# Patient Record
Sex: FEMALE | Race: WHITE | NOT HISPANIC OR LATINO | Employment: OTHER | URBAN - METROPOLITAN AREA
[De-identification: names, ages, dates, MRNs, and addresses within clinical notes are randomized per-mention and may not be internally consistent; named-entity substitution may affect disease eponyms.]

---

## 2017-03-13 ENCOUNTER — HOSPITAL ENCOUNTER (OUTPATIENT)
Dept: RADIOLOGY | Facility: CLINIC | Age: 62
Discharge: HOME | End: 2017-03-13
Payer: COMMERCIAL

## 2017-03-13 ENCOUNTER — TRANSCRIBE ORDERS (OUTPATIENT)
Dept: RADIOLOGY | Facility: CLINIC | Age: 62
End: 2017-03-13

## 2017-03-13 DIAGNOSIS — R30.0 DYSURIA: Primary | ICD-10-CM

## 2017-03-13 PROCEDURE — 74000 HB X-RAY EXAM OF ABDOMEN (SINGLE ANTEROPOSTERIOR VIEW): CPT

## 2017-06-30 ENCOUNTER — ALLSCRIPTS OFFICE VISIT (OUTPATIENT)
Dept: OTHER | Facility: OTHER | Age: 62
End: 2017-06-30

## 2017-08-20 ENCOUNTER — HOSPITAL ENCOUNTER (EMERGENCY)
Facility: HOSPITAL | Age: 62
Discharge: HOME | End: 2017-08-20
Payer: COMMERCIAL

## 2017-08-20 ENCOUNTER — APPOINTMENT (EMERGENCY)
Dept: RADIOLOGY | Facility: HOSPITAL | Age: 62
End: 2017-08-20
Payer: COMMERCIAL

## 2017-08-20 VITALS
DIASTOLIC BLOOD PRESSURE: 90 MMHG | TEMPERATURE: 98.6 F | HEIGHT: 67 IN | OXYGEN SATURATION: 98 % | SYSTOLIC BLOOD PRESSURE: 207 MMHG | BODY MASS INDEX: 33.74 KG/M2 | WEIGHT: 215 LBS | HEART RATE: 75 BPM | RESPIRATION RATE: 14 BRPM

## 2017-08-20 DIAGNOSIS — J02.9 ACUTE PHARYNGITIS: Primary | ICD-10-CM

## 2017-08-20 LAB — S PYO AG THROAT QL: NEGATIVE

## 2017-08-20 PROCEDURE — 87070 CULTURE OTHR SPECIMN AEROBIC: CPT | Performed by: PHYSICIAN ASSISTANT

## 2017-08-20 PROCEDURE — 87430 STREP A AG IA: CPT | Performed by: PHYSICIAN ASSISTANT

## 2017-08-20 PROCEDURE — 99284 EMERGENCY DEPT VISIT MOD MDM: CPT

## 2017-08-20 PROCEDURE — 70360 X-RAY EXAM OF NECK: CPT

## 2017-08-20 RX ORDER — LOSARTAN POTASSIUM 100 MG/1
100 TABLET ORAL DAILY
COMMUNITY
End: 2018-04-04 | Stop reason: SDUPTHER

## 2017-08-20 RX ORDER — DABIGATRAN ETEXILATE 150 MG/1
150 CAPSULE, COATED PELLETS ORAL 2 TIMES DAILY
COMMUNITY

## 2017-08-20 RX ADMIN — LIDOCAINE HYDROCHLORIDE 5 ML: 20 SOLUTION ORAL; TOPICAL at 16:57

## 2017-08-21 ENCOUNTER — APPOINTMENT (EMERGENCY)
Dept: RADIOLOGY | Facility: HOSPITAL | Age: 62
End: 2017-08-21
Payer: COMMERCIAL

## 2017-08-21 ENCOUNTER — HOSPITAL ENCOUNTER (EMERGENCY)
Facility: HOSPITAL | Age: 62
Discharge: HOME | End: 2017-08-21
Payer: COMMERCIAL

## 2017-08-21 VITALS
HEIGHT: 67 IN | OXYGEN SATURATION: 96 % | DIASTOLIC BLOOD PRESSURE: 76 MMHG | SYSTOLIC BLOOD PRESSURE: 145 MMHG | WEIGHT: 220 LBS | RESPIRATION RATE: 20 BRPM | TEMPERATURE: 96.4 F | BODY MASS INDEX: 34.53 KG/M2 | HEART RATE: 78 BPM

## 2017-08-21 DIAGNOSIS — R00.2 PALPITATIONS: Primary | ICD-10-CM

## 2017-08-21 LAB
ANION GAP SERPL CALCULATED.3IONS-SCNC: 9 MMOL/L (ref 4–13)
APTT PPP: 70 SECONDS (ref 23–35)
BASOPHILS # BLD AUTO: 0.04 THOUSANDS/ΜL (ref 0–0.1)
BASOPHILS NFR BLD AUTO: 1 % (ref 0–1)
BUN SERPL-MCNC: 10 MG/DL (ref 5–25)
CALCIUM SERPL-MCNC: 9.6 MG/DL (ref 8.3–10.1)
CHLORIDE SERPL-SCNC: 101 MMOL/L (ref 100–108)
CO2 SERPL-SCNC: 30 MMOL/L (ref 21–32)
CREAT SERPL-MCNC: 1.04 MG/DL (ref 0.6–1.3)
EOSINOPHIL # BLD AUTO: 0.1 THOUSAND/ΜL (ref 0–0.61)
EOSINOPHIL NFR BLD AUTO: 1 % (ref 0–6)
ERYTHROCYTE [DISTWIDTH] IN BLOOD BY AUTOMATED COUNT: 12.3 % (ref 11.6–15.1)
GFR SERPL CREATININE-BSD FRML MDRD: 57 ML/MIN/1.73SQ M
GLUCOSE SERPL-MCNC: 178 MG/DL (ref 65–140)
HCT VFR BLD AUTO: 47.4 % (ref 34.8–46.1)
HGB BLD-MCNC: 15.7 G/DL (ref 11.5–15.4)
INR PPP: 1.32 (ref 0.86–1.16)
LYMPHOCYTES # BLD AUTO: 1.62 THOUSANDS/ΜL (ref 0.6–4.47)
LYMPHOCYTES NFR BLD AUTO: 19 % (ref 14–44)
MCH RBC QN AUTO: 30 PG (ref 26.8–34.3)
MCHC RBC AUTO-ENTMCNC: 33.1 G/DL (ref 31.4–37.4)
MCV RBC AUTO: 91 FL (ref 82–98)
MONOCYTES # BLD AUTO: 0.73 THOUSAND/ΜL (ref 0.17–1.22)
MONOCYTES NFR BLD AUTO: 9 % (ref 4–12)
NEUTROPHILS # BLD AUTO: 6.11 THOUSANDS/ΜL (ref 1.85–7.62)
NEUTS SEG NFR BLD AUTO: 70 % (ref 43–75)
PLATELET # BLD AUTO: 276 THOUSANDS/UL (ref 149–390)
PMV BLD AUTO: 11.2 FL (ref 8.9–12.7)
POTASSIUM SERPL-SCNC: 4.1 MMOL/L (ref 3.5–5.3)
PROTHROMBIN TIME: 16.2 SECONDS (ref 12.1–14.4)
RBC # BLD AUTO: 5.23 MILLION/UL (ref 3.81–5.12)
SODIUM SERPL-SCNC: 140 MMOL/L (ref 136–145)
TSH SERPL DL<=0.05 MIU/L-ACNC: 2.69 UIU/ML (ref 0.36–3.74)
WBC # BLD AUTO: 8.6 THOUSAND/UL (ref 4.31–10.16)

## 2017-08-21 PROCEDURE — 71020 HB CHEST X-RAY 2VW FRONTAL&LATL: CPT

## 2017-08-21 PROCEDURE — 99285 EMERGENCY DEPT VISIT HI MDM: CPT

## 2017-08-21 PROCEDURE — 85730 THROMBOPLASTIN TIME PARTIAL: CPT | Performed by: PHYSICIAN ASSISTANT

## 2017-08-21 PROCEDURE — 85610 PROTHROMBIN TIME: CPT | Performed by: PHYSICIAN ASSISTANT

## 2017-08-21 PROCEDURE — 93005 ELECTROCARDIOGRAM TRACING: CPT | Performed by: PHYSICIAN ASSISTANT

## 2017-08-21 PROCEDURE — 84443 ASSAY THYROID STIM HORMONE: CPT | Performed by: PHYSICIAN ASSISTANT

## 2017-08-21 PROCEDURE — 36415 COLL VENOUS BLD VENIPUNCTURE: CPT | Performed by: PHYSICIAN ASSISTANT

## 2017-08-21 PROCEDURE — 80048 BASIC METABOLIC PNL TOTAL CA: CPT | Performed by: PHYSICIAN ASSISTANT

## 2017-08-21 PROCEDURE — 85025 COMPLETE CBC W/AUTO DIFF WBC: CPT | Performed by: PHYSICIAN ASSISTANT

## 2017-08-22 LAB
ATRIAL RATE: 90 BPM
BACTERIA THROAT CULT: NORMAL
P AXIS: 73 DEGREES
PR INTERVAL: 158 MS
QRS AXIS: 52 DEGREES
QRSD INTERVAL: 66 MS
QT INTERVAL: 346 MS
QTC INTERVAL: 423 MS
T WAVE AXIS: 76 DEGREES
VENTRICULAR RATE: 90 BPM

## 2017-09-15 ENCOUNTER — TRANSCRIBE ORDERS (OUTPATIENT)
Dept: ADMINISTRATIVE | Facility: HOSPITAL | Age: 62
End: 2017-09-15

## 2017-09-15 DIAGNOSIS — Z12.31 VISIT FOR SCREENING MAMMOGRAM: Primary | ICD-10-CM

## 2017-10-30 ENCOUNTER — HOSPITAL ENCOUNTER (OUTPATIENT)
Dept: BONE DENSITY | Facility: IMAGING CENTER | Age: 62
Discharge: HOME | End: 2017-10-30
Payer: COMMERCIAL

## 2017-10-30 DIAGNOSIS — Z12.31 VISIT FOR SCREENING MAMMOGRAM: ICD-10-CM

## 2017-10-30 PROCEDURE — G0202 SCR MAMMO BI INCL CAD: HCPCS

## 2018-01-11 NOTE — MISCELLANEOUS
Message   Date: 30 Jun 2016 8:32 AM EST, Recorded By: Enricoregino VillarrealShemar Coronado   Phone: [phone] (Home), [phone] (Work)   Reason: Medical Complaint   Telephone call from pt with c/c of feeling nauseated, fatigued,and lo grade fever  Patient was seen in the ER at [facility] on Sunday June 26 pt was instructed to call her cardiologist and PCP for f/u appoinments  Pt states that she continues with the above symptoms not feeling any better has an appointment with PCP July 1,2016 would like Dr Daryl Dsouza to address the above symptoms, Pt also relates symptioms to medications fo Hydralzine, Eliquis and Losartan  Informed pt that I will speak to Dr Daryl Dsouza and advise  Telephone call made to Dr Daryl Dsouza and he has instructed pt to stop Hydralzine and make a follow up appointment  Pt has an appointment on 7/18 /16 with Dr Daryl Dsouza  Telephone call made to patient and informed her of the above decision and patiient verbalizes and understands to stop her Hydralzine and follow up with PCP tomorrow  Active Problems    1  AC (acromioclavicular) joint arthritis (716 91) (M19 90)   2  Adhesive capsulitis of right shoulder (726 0) (M75 01)   3  Afib (427 31) (I48 91)   4  Benign essential hypertension (401 1) (I10)   5  Impingement syndrome of right shoulder (726 2) (M75 41)   6  Plantar fasciitis of right foot (728 71) (M72 2)   7  Right foot pain (729 5) (M79 671)   8  Right shoulder pain (719 41) (M25 511)    Current Meds   1  Eliquis 5 MG Oral Tablet; Take 1 tablet twice daily; Therapy: (Recorded:06Jun2016) to Recorded   2  HydrALAZINE HCl - 25 MG Oral Tablet; Take 1 tablet twice daily; Therapy: 31DEU3578 to (Evaluate:09Hbe3429)  Requested for: 58GUN0175; Last   Rx:06Jun2016 Ordered   3  Losartan Potassium 100 MG Oral Tablet; TAKE ONE TABLET BY MOUTH EVERY DAY    Requested for: 38MDR5094; Last Rx:06Jun2016 Ordered   4  Metoprolol Tartrate 50 MG Oral Tablet;  Take 1 tablet twice daily; Therapy: (Recorded:06Jun2016) to Recorded   5  Protonix 40 MG Oral Tablet Delayed Release (Pantoprazole Sodium); TAKE 1 TABLET   DAILY; Therapy: (Recorded:15Oct2015) to Recorded   6  Xanax 0 25 MG Oral Tablet (ALPRAZolam); 1-2 tabs po qd; Therapy: (Recorded:15Oct2015) to Recorded   7  ZyrTEC 10 MG CHEW;   Therapy: (Recorded:15Oct2015) to Recorded    Allergies    1  Dilaudid TABS   2  Flagyl CAPS   3  Morphine Sulfate TABS   4  Cipro TABS   5  Clindamycin HCl CAPS   6   NexIUM CPDR   7  pantoprazole    Signatures   Electronically signed by : Coral Lopez OM; Jun 30 2016  8:56AM EST                       (Administrative)

## 2018-01-13 VITALS
DIASTOLIC BLOOD PRESSURE: 80 MMHG | SYSTOLIC BLOOD PRESSURE: 140 MMHG | WEIGHT: 230 LBS | BODY MASS INDEX: 34.86 KG/M2 | HEART RATE: 70 BPM | HEIGHT: 68 IN

## 2018-02-16 ENCOUNTER — OFFICE VISIT (OUTPATIENT)
Dept: CARDIOLOGY CLINIC | Facility: CLINIC | Age: 63
End: 2018-02-16
Payer: COMMERCIAL

## 2018-02-16 VITALS
SYSTOLIC BLOOD PRESSURE: 120 MMHG | WEIGHT: 232 LBS | DIASTOLIC BLOOD PRESSURE: 60 MMHG | BODY MASS INDEX: 36.34 KG/M2 | HEART RATE: 70 BPM

## 2018-02-16 DIAGNOSIS — I48.91 NEW ONSET A-FIB (HCC): Primary | Chronic | ICD-10-CM

## 2018-02-16 DIAGNOSIS — I10 ESSENTIAL HYPERTENSION: Chronic | ICD-10-CM

## 2018-02-16 PROCEDURE — 99214 OFFICE O/P EST MOD 30 MIN: CPT | Performed by: INTERNAL MEDICINE

## 2018-02-16 PROCEDURE — 93000 ELECTROCARDIOGRAM COMPLETE: CPT | Performed by: INTERNAL MEDICINE

## 2018-02-16 RX ORDER — PREDNISONE 20 MG/1
TABLET ORAL
COMMUNITY
Start: 2017-12-05 | End: 2018-02-16 | Stop reason: ALTCHOICE

## 2018-02-16 RX ORDER — METOPROLOL TARTRATE 50 MG/1
TABLET, FILM COATED ORAL
COMMUNITY
Start: 2018-02-10 | End: 2018-02-16 | Stop reason: SDUPTHER

## 2018-02-16 RX ORDER — VALACYCLOVIR HYDROCHLORIDE 1 G/1
TABLET, FILM COATED ORAL
COMMUNITY
Start: 2017-11-28 | End: 2018-02-16 | Stop reason: ALTCHOICE

## 2018-02-16 NOTE — PROGRESS NOTES
Cardiology   Grace Hutton 57 y o  female MRN: [id number]        Impression:  1  Paroxysmal atrial fibrillation - in normal sinus rhythm  On anticoagulation  Does have occasional episodes  2  Hypertension - good control      Recommendations:  1  Continue current medications  2  Can take an extra metoprolol if you are in atrial fibrillation  3  Follow up in 6 months  HPI: Grace Hutton is a 57y o  year old female with new diagnosis of atrial fibrillation in setting of hypokalemia returns for follow up  In 2016, echocardiogram demonstrated structurally normal heart  Had palpitations in 2017, went to ED and sent home  Then 10 days later, had worse episode, but remitted after 12 hours  Occasionally has palpitations  Review of Systems   Constitutional: Negative  HENT: Negative  Eyes: Negative  Respiratory: Negative for chest tightness and shortness of breath  Cardiovascular: Positive for palpitations  Negative for chest pain and leg swelling  Gastrointestinal: Negative  Endocrine: Negative  Genitourinary: Negative  Musculoskeletal: Negative  Skin: Negative  Allergic/Immunologic: Negative  Neurological: Negative  Hematological: Negative  Psychiatric/Behavioral: Negative  All other systems reviewed and are negative          Past Medical History:   Diagnosis Date    Arthritis 2016    Diabetes mellitus ([facility] [address] )     GERD (gastroesophageal reflux disease)     Hypertension 2016    Hypertensive urgency 2016    Hypokalemia 2016    Hypomagnesemia 2016    New onset a-fib ([address] ) 2016    Psychiatric disorder     anxiety    SOB (shortness of breath) 2016     Past Surgical History:   Procedure Laterality Date     SECTION      CHOLECYSTECTOMY      KIDNEY STONE SURGERY       History   Alcohol Use No     History   Drug Use No     History   Smoking Status    Former Smoker   Smokeless Tobacco    Not on file     Comment: quit over 40 years ago     History reviewed  No pertinent family history  Allergies: Allergies   Allergen Reactions    Flagyl [Metronidazole] Anaphylaxis and Rash    Hydromorphone Anaphylaxis    Morphine Anaphylaxis, Rash and GI Intolerance    Ciprofloxacin GI Intolerance    Clindamycin Other (See Comments)    Dilaudid [Hydromorphone Hcl]     Nexium  [Esomeprazole Magnesium] Diarrhea, GI Intolerance and Other (See Comments)    Pantoprazole Diarrhea and GI Intolerance    Amlodipine Rash       Medications:     Current Outpatient Prescriptions:     ALPRAZolam (XANAX) 0 25 mg tablet, 1 tablet 2 (two) times a day as needed  , Disp: , Rfl:     dabigatran etexilate (PRADAXA) 150 mg capsu, Take 150 mg by mouth 2 (two) times a day, Disp: , Rfl:     losartan (COZAAR) 100 MG tablet, Take 100 mg by mouth daily, Disp: , Rfl:     metoprolol tartrate (LOPRESSOR) 25 mg tablet, 100 mg 2 (two) times a day  , Disp: , Rfl:     pantoprazole (PROTONIX) 40 mg tablet, 1 tablet daily  , Disp: , Rfl:       Wt Readings from Last 3 Encounters:   02/16/18 105 kg (232 lb)   08/21/17 99 8 kg (220 lb)   08/20/17 97 5 kg (215 lb)     Temp Readings from Last 3 Encounters:   08/21/17 (!) 96 4 °F (35 8 °C)   08/20/17 98 6 °F (37 °C) (Tympanic)   12/17/16 98 6 °F (37 °C)     BP Readings from Last 3 Encounters:   02/16/18 120/60   08/21/17 145/76   08/20/17 (!) 207/90     Pulse Readings from Last 3 Encounters:   02/16/18 70   08/21/17 78   08/20/17 75         Physical Exam   Constitutional: She is oriented to person, place, and time  She appears well-developed  HENT:   Head: Normocephalic  Eyes: EOM are normal    Neck: Normal range of motion  Cardiovascular: Normal rate, regular rhythm and normal heart sounds  Exam reveals no gallop and no friction rub  No murmur heard  Pulmonary/Chest: Effort normal and breath sounds normal  No respiratory distress  She has no wheezes  She has no rales  Abdominal: Soft  Musculoskeletal: Normal range of motion  Neurological: She is alert and oriented to person, place, and time  Skin: Skin is warm and dry  Psychiatric: She has a normal mood and affect  Laboratory Studies:  CMP:  Lab Results   Component Value Date     2017    K 4 1 2017     2017    CO2 30 2017    ANIONGAP 9 2017    BUN 10 2017    CREATININE 1 04 2017    GLUCOSE 178 (H) 2017     (H) 2016     (H) 2016    BILITOT 1 60 (H) 2016    EGFR 57 2017       Lipid Profile:   Lab Results   Component Value Date    CHOL 152 2016     Lab Results   Component Value Date    HDL 51 2016     Lab Results   Component Value Date    LDLCALC 80 2016     Lab Results   Component Value Date    TRIG 107 2016       Cardiac testing:   EKG reviewed personally: NSR 70 Nml  Results for orders placed during the hospital encounter of 16   Echo complete with contrast if indicated    Narrative [address] [place] [address], [address] [place] [address]  [phone]    Transthoracic Echocardiogram  2D, M-mode, Doppler, and Color Doppler    Study date:  23-May-2016    Patient: Mariama Vaughn  MR number: [id number]  Account number: [de-identified]  : 1955  Age: 56 years  Gender: Female  Status: Inpatient  Location: Echo lab  Height: 68 in  Weight: 228 6 lb  BP: 111/ 58 mmHg    Indications: Atrial fibrillation  Diagnoses: I48 0 - Atrial fibrillation    Sonographer:  Crist Hammans [facility] AE-PE  Primary Physician:  Jovanni Connell DO  Referring Physician:  ECHO Baxter  Group:  [facility]  Interpreting Physician:  Leona Bernabe MD    SUMMARY    LEFT VENTRICLE:  Size was normal   Systolic function was normal  Ejection fraction was estimated to be 60 %    Wall thickness was normal   Left ventricular diastolic function parameters were normal     RIGHT VENTRICLE:  The size was normal   Systolic function was normal     MITRAL VALVE:  There was trace regurgitation  TRICUSPID VALVE:  There was trace regurgitation  HISTORY: PRIOR HISTORY: Atrial fibrillation  Risk factors: hypertension  PROCEDURE: The procedure was performed in the echo lab  This was a routine  study  The transthoracic approach was used  The study included complete 2D  imaging, M-mode, complete spectral Doppler, and color Doppler  The heart rate  was 65 bpm, at the start of the study  Image quality was adequate  LEFT VENTRICLE: Size was normal  Systolic function was normal  Ejection  fraction was estimated to be 60 %  There were no regional wall motion  abnormalities  Wall thickness was normal  DOPPLER: Left ventricular diastolic  function parameters were normal     RIGHT VENTRICLE: The size was normal  Systolic function was normal  Wall  thickness was normal     LEFT ATRIUM: Size was normal     RIGHT ATRIUM: Size was normal     MITRAL VALVE: Valve structure was normal  There was normal leaflet separation  DOPPLER: The transmitral velocity was within the normal range  There was no  evidence for stenosis  There was trace regurgitation  AORTIC VALVE: The valve was trileaflet  Leaflets exhibited normal thickness and  normal cuspal separation  DOPPLER: Transaortic velocity was within the normal  range  There was no evidence for stenosis  There was no regurgitation  TRICUSPID VALVE: The valve structure was normal  There was normal leaflet  separation  DOPPLER: The transtricuspid velocity was within the normal range  There was no evidence for stenosis  There was trace regurgitation  The  tricuspid jet envelope definition was inadequate for estimation of RV systolic  pressure  There are no indirect findings suggestive of moderate or severe  pulmonary hypertension  PULMONIC VALVE: Leaflets exhibited normal thickness, no calcification, and  normal cuspal separation  DOPPLER: The transpulmonic velocity was within the  normal range  There was no regurgitation  PERICARDIUM: There was no pericardial effusion  The pericardium was normal in  appearance  AORTA: The root exhibited normal size  SYSTEMIC VEINS: IVC: The inferior vena cava was not well visualized      SYSTEM MEASUREMENT TABLES    2D  %FS: 35 01 %  Ao Diam: 2 51 cm  EDV(Teich): 58 83 ml  EF(Teich): 65 13 %  ESV(Cube): 14 12 ml  ESV(Teich): 20 51 ml  IVSd: 0 83 cm  LA Area: 11 64 cm2  LA Diam: 3 74 cm  LVEDV MOD A4C: 52 3 ml  LVEF MOD A4C: 58 61 %  LVESV MOD A4C: 21 65 ml  LVIDd: 3 72 cm  LVIDs: 2 42 cm  LVLd A4C: 5 39 cm  LVLs A4C: 4 33 cm  LVPWd: 0 79 cm  RA Area: 12 03 cm2  RV Diam : 2 61 cm  SI(Cube): 17 19 ml/m2  SI(Teich): 17 66 ml/m2  SV MOD A4C: 30 65 ml  SV(Cube): 37 31 ml  SV(Teich): 38 32 ml    CW  TR Vmax: 2 21 m/s  TR maxP 54 mmHg    MM  TAPSE: 2 11 cm    PW  E': 0 09 m/s  E/E': 10 14  MV A Sreekanth: 0 51 m/s  MV Dec Jewell: 4 58 m/s2  MV DecT: 190 35 ms  MV E Sreekanth: 0 87 m/s  MV E/A Ratio: 1 71    [facility] Accredited Echocardiography Laboratory    Prepared and electronically signed by    Maday Lindsay MD  Signed [id number] 18:12:31

## 2018-02-16 NOTE — PATIENT INSTRUCTIONS
Recommendations:  1  Continue current medications  2  Can take an extra metoprolol if you are in atrial fibrillation  3  Follow up in 6 months

## 2018-03-09 ENCOUNTER — TELEPHONE (OUTPATIENT)
Dept: CARDIOLOGY CLINIC | Facility: CLINIC | Age: 63
End: 2018-03-09

## 2018-03-09 DIAGNOSIS — R00.2 PALPITATIONS: Primary | ICD-10-CM

## 2018-03-09 NOTE — TELEPHONE ENCOUNTER
Patient called answering service 2:48 am this morning (friday) 8/9/18  Patient was in AFIB and her BP was 166/134  The on call  was to be paged    I just spoke with patient to follow up and she states she was awoken around or just before 2:00 am this morning  She was feeling as though she was unable to breathe and swallow  Patient stated she was "sweating and had numbness and tingling down her left arm and shoulder"  At this time she immediately went "downstairs and took a 25 mg of Metoprolol as directed and took her B P "  BP was 166/134 and HR was 146  Patient continued to take BP and P about every 15 mins   BP - 152/89,   P - 92  BP - 190/144, P - 123  BP - 160/101, P - 178  BP - 182/163, P - 104  BP - 167/99,   P - 113  BP - 161/123, P - 148    Patient then called answering service around 2:48 am   Patient states she was ready to go to the hospital when it finally "broke and she started to feel better", which was about an hour and a half  Patient stated she is not in AFIB this morning and took her BP approx  7:30 am and it was 157/85, P was "normal" at 85  She is feeling better and the numbness and tingling is going away in her left arm/shoulder  Patient states this seems to happen in the middle of the night and that is what "scares her"  It happens here and there during the day but she states she comes in and out of it quickly and she is not concerned really about that, it's when she is sleeping and it happens that concerns her and she states it's been happening more      Please advise

## 2018-03-13 DIAGNOSIS — I48.91 ATRIAL FIBRILLATION, UNSPECIFIED TYPE (HCC): Primary | ICD-10-CM

## 2018-03-13 RX ORDER — METOPROLOL TARTRATE 50 MG/1
TABLET, FILM COATED ORAL
Refills: 4 | COMMUNITY
Start: 2018-02-26 | End: 2018-06-29

## 2018-03-13 NOTE — PROGRESS NOTES
As per Dr Kavita Corea instructions due to episode of AFIB new prescription of Diltiazem  mg BID is to be taken  Patient is already taking Metoprolol and new prescription of Diltiazem  mg BID should be taken along with the Metoprolol

## 2018-03-19 RX ORDER — DILTIAZEM HYDROCHLORIDE 120 MG/1
120 CAPSULE, EXTENDED RELEASE ORAL 2 TIMES DAILY
Qty: 60 CAPSULE | Refills: 11 | Status: SHIPPED | OUTPATIENT
Start: 2018-03-19 | End: 2021-08-02

## 2018-03-20 RX ORDER — DILTIAZEM HYDROCHLORIDE 120 MG/1
120 TABLET, FILM COATED ORAL EVERY 12 HOURS SCHEDULED
Qty: 60 TABLET | Refills: 5 | OUTPATIENT
Start: 2018-03-20 | End: 2018-06-29

## 2018-03-20 NOTE — ADDENDUM NOTE
Addended by: X [facility] A on: 3/20/2018 08:09 AM     Modules accepted: Orders

## 2018-04-04 DIAGNOSIS — I10 ESSENTIAL HYPERTENSION: Primary | ICD-10-CM

## 2018-04-04 RX ORDER — LOSARTAN POTASSIUM 100 MG/1
100 TABLET ORAL DAILY
Qty: 90 TABLET | Refills: 3 | Status: SHIPPED | OUTPATIENT
Start: 2018-04-04

## 2018-05-07 ENCOUNTER — TRANSCRIBE ORDERS (OUTPATIENT)
Dept: ADMINISTRATIVE | Facility: HOSPITAL | Age: 63
End: 2018-05-07

## 2018-05-07 DIAGNOSIS — E04.1 NONTOXIC UNINODULAR GOITER: Primary | ICD-10-CM

## 2018-05-10 ENCOUNTER — HOSPITAL ENCOUNTER (OUTPATIENT)
Dept: ULTRASOUND IMAGING | Facility: HOSPITAL | Age: 63
Discharge: HOME | End: 2018-05-10
Attending: FAMILY MEDICINE
Payer: COMMERCIAL

## 2018-05-10 DIAGNOSIS — E04.1 THYROID NODULE: ICD-10-CM

## 2018-05-10 DIAGNOSIS — E04.1 NONTOXIC UNINODULAR GOITER: ICD-10-CM

## 2018-05-10 PROCEDURE — 76536 US EXAM OF HEAD AND NECK: CPT

## 2018-06-29 ENCOUNTER — HOSPITAL ENCOUNTER (EMERGENCY)
Facility: HOSPITAL | Age: 63
Discharge: HOME | End: 2018-06-29
Admitting: EMERGENCY MEDICINE
Payer: COMMERCIAL

## 2018-06-29 ENCOUNTER — APPOINTMENT (EMERGENCY)
Dept: CT IMAGING | Facility: HOSPITAL | Age: 63
End: 2018-06-29
Payer: COMMERCIAL

## 2018-06-29 VITALS
RESPIRATION RATE: 18 BRPM | SYSTOLIC BLOOD PRESSURE: 161 MMHG | HEIGHT: 68 IN | DIASTOLIC BLOOD PRESSURE: 66 MMHG | HEART RATE: 90 BPM | TEMPERATURE: 100.5 F | BODY MASS INDEX: 34.86 KG/M2 | OXYGEN SATURATION: 98 % | WEIGHT: 230 LBS

## 2018-06-29 DIAGNOSIS — R73.09 ELEVATED RANDOM BLOOD GLUCOSE LEVEL: Primary | ICD-10-CM

## 2018-06-29 DIAGNOSIS — R10.9 ABDOMINAL PAIN: ICD-10-CM

## 2018-06-29 DIAGNOSIS — R11.0 NAUSEA: ICD-10-CM

## 2018-06-29 LAB
ALBUMIN SERPL BCP-MCNC: 3.4 G/DL (ref 3.5–5)
ALP SERPL-CCNC: 155 U/L (ref 46–116)
ALT SERPL W P-5'-P-CCNC: 42 U/L (ref 12–78)
ANION GAP SERPL CALCULATED.3IONS-SCNC: 9 MMOL/L (ref 4–13)
AST SERPL W P-5'-P-CCNC: 31 U/L (ref 5–45)
BASOPHILS # BLD AUTO: 0.02 THOUSANDS/ΜL (ref 0–0.1)
BASOPHILS NFR BLD AUTO: 0 % (ref 0–1)
BILIRUB SERPL-MCNC: 0.6 MG/DL (ref 0.2–1)
BUN SERPL-MCNC: 14 MG/DL (ref 5–25)
CALCIUM SERPL-MCNC: 8.9 MG/DL (ref 8.3–10.1)
CHLORIDE SERPL-SCNC: 101 MMOL/L (ref 100–108)
CO2 SERPL-SCNC: 28 MMOL/L (ref 21–32)
CREAT SERPL-MCNC: 1.08 MG/DL (ref 0.6–1.3)
EOSINOPHIL # BLD AUTO: 0.04 THOUSAND/ΜL (ref 0–0.61)
EOSINOPHIL NFR BLD AUTO: 1 % (ref 0–6)
ERYTHROCYTE [DISTWIDTH] IN BLOOD BY AUTOMATED COUNT: 12.5 % (ref 11.6–15.1)
GFR SERPL CREATININE-BSD FRML MDRD: 54 ML/MIN/1.73SQ M
GLUCOSE SERPL-MCNC: 220 MG/DL (ref 65–140)
HCT VFR BLD AUTO: 46.2 % (ref 34.8–46.1)
HGB BLD-MCNC: 15.4 G/DL (ref 11.5–15.4)
IMM GRANULOCYTES # BLD AUTO: 0.05 THOUSAND/UL (ref 0–0.2)
IMM GRANULOCYTES NFR BLD AUTO: 1 % (ref 0–2)
LYMPHOCYTES # BLD AUTO: 0.39 THOUSANDS/ΜL (ref 0.6–4.47)
LYMPHOCYTES NFR BLD AUTO: 6 % (ref 14–44)
MCH RBC QN AUTO: 29.8 PG (ref 26.8–34.3)
MCHC RBC AUTO-ENTMCNC: 33.3 G/DL (ref 31.4–37.4)
MCV RBC AUTO: 90 FL (ref 82–98)
MONOCYTES # BLD AUTO: 0.39 THOUSAND/ΜL (ref 0.17–1.22)
MONOCYTES NFR BLD AUTO: 6 % (ref 4–12)
NEUTROPHILS # BLD AUTO: 6.26 THOUSANDS/ΜL (ref 1.85–7.62)
NEUTS SEG NFR BLD AUTO: 86 % (ref 43–75)
PLATELET # BLD AUTO: 196 THOUSANDS/UL (ref 149–390)
PMV BLD AUTO: 10.8 FL (ref 8.9–12.7)
POTASSIUM SERPL-SCNC: 3.7 MMOL/L (ref 3.5–5.3)
PROT SERPL-MCNC: 7.9 G/DL (ref 6.4–8.2)
RBC # BLD AUTO: 5.16 MILLION/UL (ref 3.81–5.12)
SODIUM SERPL-SCNC: 138 MMOL/L (ref 136–145)
TROPONIN I SERPL-MCNC: <0.02 NG/ML
WBC # BLD AUTO: 7.15 THOUSAND/UL (ref 4.31–10.16)

## 2018-06-29 PROCEDURE — 80053 COMPREHEN METABOLIC PANEL: CPT | Performed by: PHYSICIAN ASSISTANT

## 2018-06-29 PROCEDURE — 82948 REAGENT STRIP/BLOOD GLUCOSE: CPT

## 2018-06-29 PROCEDURE — 85025 COMPLETE CBC W/AUTO DIFF WBC: CPT | Performed by: PHYSICIAN ASSISTANT

## 2018-06-29 PROCEDURE — 74177 CT ABD & PELVIS W/CONTRAST: CPT

## 2018-06-29 PROCEDURE — 36415 COLL VENOUS BLD VENIPUNCTURE: CPT | Performed by: PHYSICIAN ASSISTANT

## 2018-06-29 PROCEDURE — 93005 ELECTROCARDIOGRAM TRACING: CPT

## 2018-06-29 PROCEDURE — 84484 ASSAY OF TROPONIN QUANT: CPT | Performed by: PHYSICIAN ASSISTANT

## 2018-06-29 PROCEDURE — 99284 EMERGENCY DEPT VISIT MOD MDM: CPT

## 2018-06-29 RX ORDER — ACETAMINOPHEN 325 MG/1
650 TABLET ORAL ONCE
Status: COMPLETED | OUTPATIENT
Start: 2018-06-29 | End: 2018-06-29

## 2018-06-29 RX ADMIN — ACETAMINOPHEN 650 MG: 325 TABLET, FILM COATED ORAL at 21:57

## 2018-06-29 RX ADMIN — IOHEXOL 100 ML: 350 INJECTION, SOLUTION INTRAVENOUS at 20:16

## 2018-06-29 NOTE — ED PROVIDER NOTES
History  Chief Complaint   Patient presents with    Medication Problem     Patient states she was started on metformin Tuesday a week ago  Patient has been having difficulty breathing, GERD, mouth sore, Patient stopped taking the midication over the weekend  Patient states the symptoms are better but still feels nauseated  Patient  called her PCp three times today  Patient had chills all day and then was burningup     25-year-old female presents to the ER with complaint of nausea, abdominal pain and possible medication reaction to metformin  Patient states that she was started on metformin Tuesday of last week  Patient states that since taking that medication she had been experiencing symptoms including difficulty breathing, GERD and mouth soreness  Patient states that she stopped taking the medication over the weekend  Patient states the symptoms have improved since she stopped the medication but she is still feeling nauseated at times  Patient states that she called her PCP three times today and they have not called back so she decided to come to the ER  Patient states that she also had intermittent chills and hot spells  Patient did not take her temperature but does not think that they were fevers  Discussed with patient if she was checking her blood sugar levels daily and patient states that she has not been taught how to check her blood sugar levels and does not have a glucometer yet  Patient states that she has had a very limited diet since she is not sure exactly what foods will increase her blood sugars and which foods will not  Patient states that she has not been given much education on diabetes yet but she is supposed to go to a diabetes management class that has not been set up yet  Patient has history of GERD, hypertension  Prior to Admission Medications   Prescriptions Last Dose Informant Patient Reported? Taking?    ALPRAZolam (XANAX) 0 25 mg tablet  Self Yes Yes   Si tablet 2 (two) times a day as needed  dabigatran etexilate (PRADAXA) 150 mg capsu  Self Yes Yes   Sig: Take 150 mg by mouth 2 (two) times a day   diltiazem (CARDIZEM SR) 120 mg 12 hr capsule   No Yes   Sig: Take 1 capsule (120 mg total) by mouth 2 (two) times a day   losartan (COZAAR) 100 MG tablet   No Yes   Sig: Take 1 tablet (100 mg total) by mouth daily   metoprolol tartrate (LOPRESSOR) 25 mg tablet  Self Yes Yes   Si mg 2 (two) times a day     pantoprazole (PROTONIX) 40 mg tablet  Self Yes Yes   Si tablet daily  Facility-Administered Medications: None       Past Medical History:   Diagnosis Date    Arthritis 2016    Diabetes mellitus ([facility] )     GERD (gastroesophageal reflux disease)     Hypertension 2016    Hypertensive urgency 2016    Hypokalemia 2016    Hypomagnesemia 2016    New onset a-fib ([facility] ) 2016    Psychiatric disorder     anxiety    SOB (shortness of breath) 2016       Past Surgical History:   Procedure Laterality Date     SECTION      CHOLECYSTECTOMY      KIDNEY STONE SURGERY         History reviewed  No pertinent family history  I have reviewed and agree with the history as documented  Social History   Substance Use Topics    Smoking status: Former Smoker    Smokeless tobacco: Never Used      Comment: quit over 40 years ago    Alcohol use No        Review of Systems   Constitutional: Positive for chills (with hot flashes)  Negative for fever  HENT: Positive for mouth sores  Eyes: Negative  Respiratory: Positive for shortness of breath  Cardiovascular: Negative  Gastrointestinal: Positive for abdominal pain and nausea  Negative for vomiting  Genitourinary: Negative  Musculoskeletal: Negative  Skin: Negative  Neurological: Negative  All other systems reviewed and are negative  Physical Exam  Physical Exam   Constitutional: She is oriented to person, place, and time   Vital signs are normal  She appears well-developed and well-nourished  No distress  HENT:   Head: Normocephalic and atraumatic  Right Ear: Tympanic membrane normal    Left Ear: Tympanic membrane normal    Mouth/Throat: Uvula is midline, oropharynx is clear and moist and mucous membranes are normal    Eyes: Conjunctivae and EOM are normal  Pupils are equal, round, and reactive to light  Neck: Normal range of motion  Neck supple  Cardiovascular: Normal rate, regular rhythm, normal heart sounds and intact distal pulses  Pulmonary/Chest: Effort normal and breath sounds normal  No respiratory distress  She has no wheezes  She exhibits no tenderness  Abdominal: Soft  Bowel sounds are normal  She exhibits no distension  There is tenderness in the epigastric area  There is no rigidity, no guarding and no CVA tenderness  Musculoskeletal: Normal range of motion  She exhibits no tenderness  Neurological: She is alert and oriented to person, place, and time  Skin: Skin is warm and dry  Capillary refill takes less than 2 seconds  She is not diaphoretic  Psychiatric: She has a normal mood and affect  Her speech is normal and behavior is normal  Judgment and thought content normal    Nursing note and vitals reviewed        Vital Signs  ED Triage Vitals   Temperature Pulse Respirations Blood Pressure SpO2   06/29/18 1708 06/29/18 1708 06/29/18 1708 06/29/18 1708 06/29/18 1708   98 9 °F (37 2 °C) 98 20 163/77 93 %      Temp Source Heart Rate Source Patient Position - Orthostatic VS BP Location FiO2 (%)   06/29/18 2134 06/29/18 2134 -- -- --   Tympanic Monitor         Pain Score       06/29/18 1708       7           Vitals:    06/29/18 1708 06/29/18 1730 06/29/18 2134   BP: 163/77 150/57 161/66   Pulse: 98 99 90       Visual Acuity      ED Medications  Medications   iohexol (OMNIPAQUE) 350 MG/ML injection (MULTI-DOSE) 100 mL (100 mL Intravenous Given 6/29/18 2016)   acetaminophen (TYLENOL) tablet 650 mg (650 mg Oral Given 6/29/18 2157) Diagnostic Studies  Results Reviewed     Procedure Component Value Units Date/Time    Fingerstick Glucose (POCT) [[id number]]  (Abnormal) Collected:  06/29/18 1722    Lab Status:  Final result Updated:  06/30/18 1448     POC Glucose 215 (H) mg/dl     Comprehensive metabolic panel [[id number]]  (Abnormal) Collected:  06/29/18 1807    Lab Status:  Final result Specimen:  Blood from Arm, Left Updated:  06/29/18 1839     Sodium 138 mmol/L      Potassium 3 7 mmol/L      Chloride 101 mmol/L      CO2 28 mmol/L      Anion Gap 9 mmol/L      BUN 14 mg/dL      Creatinine 1 08 mg/dL      Glucose 220 (H) mg/dL      Calcium 8 9 mg/dL      AST 31 U/L      ALT 42 U/L      Alkaline Phosphatase 155 (H) U/L      Total Protein 7 9 g/dL      Albumin 3 4 (L) g/dL      Total Bilirubin 0 60 mg/dL      eGFR 54 ml/min/1 73sq m     Narrative:         National Kidney Disease Education Program recommendations are as follows:  GFR calculation is accurate only with a steady state creatinine  Chronic Kidney disease less than 60 ml/min/1 73 sq  meters  Kidney failure less than 15 ml/min/1 73 sq  meters      Troponin I [[id number]]  (Normal) Collected:  06/29/18 1807    Lab Status:  Final result Specimen:  Blood from Arm, Left Updated:  06/29/18 1838     Troponin I <0 02 ng/mL     CBC and differential [[id number]]  (Abnormal) Collected:  06/29/18 1807    Lab Status:  Final result Specimen:  Blood from Arm, Left Updated:  06/29/18 1820     WBC 7 15 Thousand/uL      RBC 5 16 (H) Million/uL      Hemoglobin 15 4 g/dL      Hematocrit 46 2 (H) %      MCV 90 fL      MCH 29 8 pg      MCHC 33 3 g/dL      RDW 12 5 %      MPV 10 8 fL      Platelets 768 Thousands/uL      Neutrophils Relative 86 (H) %      Immat GRANS % 1 %      Lymphocytes Relative 6 (L) %      Monocytes Relative 6 %      Eosinophils Relative 1 %      Basophils Relative 0 %      Neutrophils Absolute 6 26 Thousands/µL      Immature Grans Absolute 0 05 Thousand/uL      Lymphocytes Absolute 0 39 (L) Thousands/µL      Monocytes Absolute 0 39 Thousand/µL      Eosinophils Absolute 0 04 Thousand/µL      Basophils Absolute 0 02 Thousands/µL                  CT abdomen pelvis with contrast   Final Result by Joaquín Garner MD (06/29 2038)      Hepatic steatosis  Right renal midpole subcentimeter cyst with question septation, correlate with nonemergent outpatient renal ultrasound  Workstation performed: [id number]                    Procedures  ECG 12 Lead Documentation  Date/Time: 6/29/2018 6:16 PM  Performed by: Debra Flores  Authorized by: Olga Potter     Indications / Diagnosis:  SOB  ECG reviewed by me, the ED Provider: yes    Patient location:  ED  Previous ECG:     Previous ECG:  Compared to current    Comparison ECG info:  8/21/17    Similarity:  No change  Interpretation:     Interpretation: normal    Rate:     ECG rate:  100    ECG rate assessment: normal    Rhythm:     Rhythm: sinus rhythm    Ectopy:     Ectopy: none    QRS:     QRS axis:  Normal    QRS intervals:  Normal  Conduction:     Conduction: normal    ST segments:     ST segments:  Normal  T waves:     T waves: normal             Phone Contacts  ED Phone Contact    ED Course                               MDM  Number of Diagnoses or Management Options  Abdominal pain: established and worsening  Elevated random blood glucose level: new and requires workup  Nausea: new and does not require workup  Diagnosis management comments: Discussed with patient that the symptom she is feeling could be related to fluctuating blood sugar levels since she is not taking metformin at this time  Also discussed with patient that it could be her blood pressure which she states she does take medication for  Advised patient to follow up with family doctor and if symptoms worsen or new symptoms develop to return to the ER         Amount and/or Complexity of Data Reviewed  Clinical lab tests: ordered and reviewed  Tests in the radiology section of CPT®: ordered and reviewed    Patient Progress  Patient progress: stable    CritCare Time    Disposition  Final diagnoses:   Elevated random blood glucose level   Nausea   Abdominal pain     Time reflects when diagnosis was documented in both MDM as applicable and the Disposition within this note     Time User Action Codes Description Comment    6/29/2018  9:20 PM Blackstone Linwood Add [R73 09] Elevated random blood glucose level     6/29/2018  9:20 PM Pinky Lacy [R11 0] Nausea     6/29/2018  9:20 PM Blackstone Linwood Add [R10 9] Abdominal pain       ED Disposition     ED Disposition Condition Comment    Discharge  Naina Hicks discharge to home/self care  Condition at discharge: Stable        Follow-up Information     Follow up With Specialties Details Why Contact Aster Eckert DO Family Medicine Call For Recheck, If symptoms worsen [address] [facility]  [address] [facility]            Discharge Medication List as of 6/29/2018  9:30 PM      CONTINUE these medications which have NOT CHANGED    Details   ALPRAZolam (XANAX) 0 25 mg tablet 1 tablet 2 (two) times a day as needed   , Historical Med      dabigatran etexilate (PRADAXA) 150 mg capsu Take 150 mg by mouth 2 (two) times a day, Historical Med      diltiazem (CARDIZEM SR) 120 mg 12 hr capsule Take 1 capsule (120 mg total) by mouth 2 (two) times a day, Starting Mon 3/19/2018, Normal      losartan (COZAAR) 100 MG tablet Take 1 tablet (100 mg total) by mouth daily, Starting Wed 4/4/2018, Normal      metoprolol tartrate (LOPRESSOR) 25 mg tablet 100 mg 2 (two) times a day  , Historical Med      pantoprazole (PROTONIX) 40 mg tablet 1 tablet daily  , Historical Med           No discharge procedures on file      ED Provider  Electronically Signed by           Elida Chen PA-C  07/16/18 0008

## 2018-06-30 LAB — GLUCOSE SERPL-MCNC: 215 MG/DL (ref 65–140)

## 2018-06-30 NOTE — DISCHARGE INSTRUCTIONS
Drink plenty of fluids  Follow up with your PCP for recheck and to start a different diabetes medication  Limit number of carbs and try to avoid sweets and carbs  Meal Planning with Diabetes Exchanges   WHAT YOU NEED TO KNOW:   What do I need to know about diabetes exchanges? Exchanges are servings of food that contain similar amounts of carbohydrate, fat, protein, and calories within a food group  The exchanges can be used to develop a healthy meal plan that helps to keep your blood sugar within the recommended levels  A meal plan with the right amount of carbohydrates is especially important  Your blood sugar naturally rises after you eat carbohydrates  Too many carbohydrates in 1 meal or snack can raise your blood sugar level  Carbohydrates are found in starches, fruit, milk, yogurt, and sweets  How do I create a meal plan with exchanges? A dietitian will work with you to develop a healthy meal plan that is right for you  This meal plan will include the amount of exchanges you can have from each food group throughout the day  Follow your meal plan by keeping track of the amount of exchanges you eat for each meal and snack  Your meal plan will be based on your age, weight, blood sugar levels, medicine, and activity level  What are the starch food group exchanges? Each exchange below contains about 15 grams of carbohydrate , 3 grams of protein, 1 gram of fat, and 80 calories  · 1 ounce of white, whole wheat or rye bread (1 slice)    · 1 ounce of bagel (about ¼ of a bagel)    · 1 6-inch flour or corn tortilla or 1 4-inch pancake (about ¼ inch thick)    · ?  cup of cooked pasta or rice    · ¾ cup of dry, ready-to-eat cereal with no sugar added     · ½ cup of cooked cereal, such as oatmeal    · 3 gretel cracker squares or 8 animal crackers    · 6 saltine-type crackers or     · 3 cups of popcorn or ¾ ounce of pretzels     · Starchy vegetables and cooked legumes:      ¨ ½ cup of corn, green peas, sweet potatoes, or mashed potatoes     ¨ ¼ of a large baked potato     ¨ 1 cup of acorn, butternut squash, or pumpkin     ¨ ½ cup of beans, lentils, or peas (such as cannon, kidney, or black-eyed)    ¨ ? cup of lima beans  What are the fruit group exchanges? Each exchange contains about 15 grams of carbohydrate  and 60 calories  · 1 small (4 ounce) apple, banana orange, or nectarine    · ½ cup of canned or fresh fruit    · ½ cup (4 ounces) of unsweetened fruit juice    · 2 tablespoons of dried fruit  What are the milk group exchanges? Each exchange contains about 12 grams of carbohydrate  and 8 grams of protein  The amount of fat and calories in each serving depends on the type of milk (such as whole, low-fat, or fat-free)  · 1 cup fat-free or low-fat milk    · ¾ cup of plain, nonfat yogurt    · 1 cup fat-free, flavored yogurt with artificial (no calorie) sweetener  What are the non-starchy vegetable group exchanges? Each exchange contains about 5 grams of carbohydrate , 2 grams of protein, and 25 calories  Examples include beets, broccoli, cabbage, carrots, cauliflower, cucumber, mushrooms, tomatoes, and zucchini  · ½ cup of cooked vegetables or 1 cup of raw vegetables     · ½ cup of vegetable juice  What are the meat and meat substitute group exchanges? Each exchange of a lean meat  listed below contains about 7 grams of protein, 0 to 3 grams of fat, and 45 calories  The meat and meat substitutes food group does not contain any carbohydrates  Medium and high-fat meats have more calories  · 1 ounce of chicken or turkey without skin, or 1 ounce of fish (not breaded or fried)     · 1 ounce of lean beef, pork, or lamb     · 1-inch cube or 1 ounce of low-fat cheese     · 2 egg whites or ¼ cup of egg substitute     · ½ cup of tofu  What are the sweets, desserts, and other carbohydrate group exchanges? · Sweets and other desserts:  Each exchange has about 15 grams of carbohydrate       ¨ 1 ounce of harley food cake or 2-inch square cake (unfrosted)    ¨ 2 small cookies     ¨ ½ cup of sugar-free, fat-free ice cream    ¨ 1 tablespoon of syrup, jam, jelly, table sugar, or honey    · Combination foods:     ¨ 1 cup of an entrée, such as lasagna, spaghetti with meatballs, macaroni and cheese, and chili with beans (each serving counts as 2 carbohydrate exchanges )     ¨ 1 cup of tomato or vegetable beef soup (each serving counts as 1 carbohydrate exchange )  What are the fat group exchanges? Each exchange contains 5 grams of fat and 45 calories  · 1 teaspoon of oil (such as canola, olive, or corn oil)     · 6 almonds or cashews, 10 peanuts, or 4 pecan halves     · 2 tablespoons of avocado     · ½ tablespoon of peanut butter     · 1 teaspoon of regular margarine or 2 teaspoons of low-fat margarine     · 1 teaspoon of regular butter or 1 tablespoon of low-fat butter     · 1 teaspoon of regular mayonnaise or 1 tablespoon of low-fat mayonnaise     · 1 tablespoon of regular salad dressing or 2 tablespoons of low-fat salad dressing  What are free foods? The foods on this list are called free foods because they have very few calories  Free foods usually do not increase your blood sugar if you limit them  · 1 tablespoon of catsup or taco sauce     · ¼ cup of salsa     · 2 tablespoons of sugar-free syrup or 2 teaspoons of light jam or jelly     · 1 tablespoon of fat-free salad dressing     · 4 tablespoons of fat-free margarine or fat-free mayonnaise     · Sugar-free drinks: diet soda, sugar-free drink mixes, or mineral water     · Low-sodium bouillon or fat-free broth     · Mustard     · Seasonings such as spices, herbs, and garlic     · Sugar-free gelatin without added fruit  What other healthy nutrition guidelines should I follow? · Eat more fiber  Choose foods that are good sources of fiber, such as fruits, vegetables, and whole grains  Cereals that contain 5 or more grams of fiber per serving are good sources of fiber   Legumes such as garbanzo, cannon beans, kidney beans, and lentils are also good sources  · Limit fat  Ask your dietitian or healthcare provider how much fat you should eat each day  Choose foods low in fat, saturated fat, trans fat, and cholesterol  Examples include turkey or chicken without the skin, fish, lean cuts of meat, and beans  Low-fat dairy foods, such as low-fat or fat-free milk and low-fat yogurt are also good choices  Omega-3 fatty acids are healthy fats that are found in canola oil, soybean oil and fatty fish  [place], albacore tuna, and sardines are good sources of omega 3 fatty acids  Eat 2 servings of these types of fish each week  Do not eat fried fish  · Limit sugar  Sugar and sweets must be counted toward the carbohydrate exchanges that you can have within your meal plan  Limit sugar and sweets because they are usually also high in calories and fat  Eat smaller portions of sweets by sharing a dessert or asking for a child-size portion at a restaurant  · Limit sodium  (salt) to about 2,300 mg per day  You may need to eat even less sodium if you have certain medical conditions  Foods high in sodium include soy sauce, potato chips, and soup  · Limit alcohol  Ask your healthcare provider if it is safe for you to drink alcohol  If alcohol is safe for you to have, eat a meal when you drink alcohol  If you drink alcohol on an empty stomach, your blood sugar may drop to a low level  Women should limit alcohol to 1 drink per day  Men should limit alcohol to 2 drinks per day  A drink of alcohol is 5 ounces of wine, 12 ounces of beer, or 1½ ounces of liquor  What else can I do to manage my diabetes? · Control your blood sugar level  Test your blood sugar level regularly and keep a record of the results  Ask your healthcare provider when and how often to test your blood sugar  You may need to check your blood sugar level at least 3 times each day  · Talk to your healthcare provider about your weight  Ask if you need to lose weight, and how much you need to lose  If you are overweight, you may need to make other changes to lose weight  Ask your healthcare provider to help you create a weight loss program      · Exercise  can help to control your blood sugar levels and decrease your risk of heart disease  It can also help you lose or maintain your weight  Get at least 30 minutes of exercise, 5 times each week  Do resistance training (using weights) 2 times each week  Do not sit for longer than 90 minutes  Work with your healthcare provider to plan the best exercise program for you  When should I contact my healthcare provider? · You have high blood sugar levels during a certain time of day, or almost all of the time  · You often have low blood sugar levels  · You have questions or concerns about your condition or care  CARE AGREEMENT:   You have the right to help plan your care  Discuss treatment options with your caregivers to decide what care you want to receive  You always have the right to refuse treatment  The above information is an  only  It is not intended as medical advice for individual conditions or treatments  Talk to your doctor, nurse or pharmacist before following any medical regimen to see if it is safe and effective for you  © 2017 [address]  Information is for End User's use only and may not be sold, redistributed or otherwise used for commercial purposes  All illustrations and images included in CareNotes® are the copyrighted property of [employer]  or [employer] Ambros[employer]  Diabetes in the Older Adult   WHAT YOU NEED TO KNOW:   Older adults with diabetes are at risk for heart disease, stroke, kidney disease, blindness, and nerve damage   You may also be at risk for any of the following:  · Poor nutrition or low blood sugar levels    · Confusion or problems with memory, attention, or learning new things    · Trouble controlling urination or frequent urinary tract infections    · Trouble with coordination or balance    · Falls and injuries    · Pain    · Depression    · Open sores on your legs or feet  DISCHARGE INSTRUCTIONS:   Call 911 for any of the following:   · You have any of the following signs of a stroke:      ¨ Numbness or drooping on one side of your face     ¨ Weakness in an arm or leg    ¨ Confusion or difficulty speaking    ¨ Dizziness, a severe headache, or vision loss    · You have any of the following signs of a heart attack:      ¨ Squeezing, pressure, or pain in your chest that lasts longer than 5 minutes or returns    ¨ Discomfort or pain in your back, neck, jaw, stomach, or arm     ¨ Trouble breathing    ¨ Nausea or vomiting    ¨ Lightheadedness or a sudden cold sweat, especially with chest pain or trouble breathing  Return to the emergency department if:   · You have severe abdominal pain, or the pain spreads to your back  You may also be vomiting  · You have trouble staying awake or focusing  · You are shaking or sweating  · You have blurred or double vision  · Your breath has a fruity, sweet smell  · Your breathing is deep and labored, or rapid and shallow  · Your heartbeat is fast and weak  · You fall and get hurt  Contact your healthcare provider if:   · You are vomiting or have diarrhea  · You have an upset stomach and cannot eat the foods on your meal plan  · You feel weak or more tired than usual      · You feel dizzy, have headaches, or are easily irritated  · Your skin is red, warm, dry, or swollen  · You have a wound that does not heal      · You have numbness in your arms or legs  · You have trouble coping with your illness, or you feel anxious or depressed  · You have problems with your memory  · You have changes in your vision  · You have questions or concerns about your condition or care    Medicines  may be given to decrease the amount of sugar in your blood  You may also need medicine to lower your blood pressure or cholesterol, or medicine to prevent blood clots  Manage the ABCs and prevent problems caused by diabetes:   · Check your blood sugar levels as directed  Your healthcare provider will tell you when and how often to check during the day  Your healthcare provider will also tell you what your blood sugar levels should be before and after a meal  You may need to check for ketones in your urine or blood if your level is higher than directed  Write down your results and show them to your healthcare provider  Your provider may use the results to make changes to your medicine, food, or exercise schedules  Ask your healthcare provider for more information about how to treat a high or low blood sugar level  · Follow your meal plan as directed  A dietitian will help you make a meal plan to keep your blood sugar level steady and make sure you get enough nutrition  Do not skip meals  Your blood sugar level may drop too low if you have taken diabetes medicine and do not eat  Ask your healthcare provider about programs in your community that can deliver the meals to your home  · Try to be active for 30 to 60 minutes most days of the week  Exercise can help keep your blood sugar level steady, decrease your risk of heart disease, and help you lose weight  It can also help improve your balance and decrease your risk for falls  Work with your healthcare provider to create an exercise plan  Ask a family member or friend to exercise with you  Start slow and exercise for 5 to 10 minutes at a time  Examples of activities include walking or swimming  Include muscle strengthening activities 2 to 3 days each week  Include balance training 2 to 3 times each week  Activities that help increase balance include yoga and melissa chi      · Maintain a healthy weight  Ask your healthcare provider how much you should weigh   A healthy weight can help you control your diabetes and prevent heart disease  Ask your provider to help you create a weight loss plan if you are overweight  Together you can set manageable weight loss goals  · Do not smoke  Ask your healthcare provider for information if you currently smoke and need help to quit  Do not use e-cigarettes or smokeless tobacco in place of cigarettes or to help you quit  They still contain nicotine  · Manage stress  Stress may increase your blood sugar level  Deep breathing, muscle relaxation, and music may help you relax  Ask your healthcare provider for more information about these practices  Other ways to manage your diabetes:   · Check your feet every day for sores  Look at your whole foot, including the bottom, and between and under your toes  Check for wounds, corns, and calluses  Use a mirror to see the bottom of your feet  The skin on your feet may be shiny, tight, dry, or darker than normal  Your feet may also be cold and pale  Feel your feet by running your hands along the tops, bottoms, sides, and between your toes  Redness, swelling, and warmth are signs of blood flow problems that can lead to a foot ulcer  Do not try to remove corns or calluses yourself  · Wear medical alert identification  Wear medical alert jewelry or carry a card that says you have diabetes  Ask your healthcare provider where to get these items  · Ask about vaccines  You have a higher risk for serious illness if you get the flu, pneumonia, or hepatitis  Ask your healthcare provider if you should get a flu, pneumonia, shingles, or hepatitis B vaccine, and when to get the vaccine  · Keep all appointments  You may need to return to have your A1c checked every 3 months  You will need to return at least once each year to have your feet checked  You will need an eye exam once a year to check for retinopathy  You will also need urine tests every year to check for kidney problems   You may need tests to monitor for heart disease  Write down your questions so you remember to ask them during your visits  · Get help from family and friends  You may need help checking your blood sugar level, giving insulin injections, or preparing your meals  Ask your family and friends to help you with these tasks  Talk to your healthcare provider if you do not have someone at home to help you  A healthcare provider can come to your home to help you with these tasks  Follow up with your healthcare provider as directed: You may need to return to have your A1c checked every 3 months  You will need to return at least once each year to have your feet checked  You will need an eye exam once a year to check for retinopathy  You will also need urine tests every year to check for kidney problems  You may need tests to monitor for heart disease  Write down your questions so you remember to ask them during your visits  © 2017 [facility] Information is for End User's use only and may not be sold, redistributed or otherwise used for commercial purposes  All illustrations and images included in CareNotes® are the copyrighted property of [employer]  or Tom Ambrosio  The above information is an  only  It is not intended as medical advice for individual conditions or treatments  Talk to your doctor, nurse or pharmacist before following any medical regimen to see if it is safe and effective for you

## 2018-07-03 LAB
ATRIAL RATE: 100 BPM
P AXIS: 71 DEGREES
PR INTERVAL: 150 MS
QRS AXIS: 54 DEGREES
QRSD INTERVAL: 66 MS
QT INTERVAL: 344 MS
QTC INTERVAL: 443 MS
T WAVE AXIS: 76 DEGREES
VENTRICULAR RATE: 100 BPM

## 2018-07-03 PROCEDURE — 93010 ELECTROCARDIOGRAM REPORT: CPT | Performed by: INTERNAL MEDICINE

## 2018-09-06 ENCOUNTER — TRANSCRIBE ORDERS (OUTPATIENT)
Dept: ADMINISTRATIVE | Facility: HOSPITAL | Age: 63
End: 2018-09-06

## 2018-09-06 DIAGNOSIS — Z12.39 SCREENING BREAST EXAMINATION: Primary | ICD-10-CM

## 2018-10-29 ENCOUNTER — TELEPHONE (OUTPATIENT)
Dept: CARDIOLOGY CLINIC | Facility: CLINIC | Age: 63
End: 2018-10-29

## 2018-10-29 NOTE — TELEPHONE ENCOUNTER
Received fax from [facility], [place] requesting refills for Losartan  Noted pt was last seen 02/16/2018 and was due to f/u in 6 months  No appt scheduled  Called pt to schedule but she notes that she is seeing a cardiologist in [place] now  Notified [facility] via return fax

## 2018-10-31 ENCOUNTER — HOSPITAL ENCOUNTER (OUTPATIENT)
Dept: BONE DENSITY | Facility: IMAGING CENTER | Age: 63
Discharge: HOME | End: 2018-10-31
Payer: COMMERCIAL

## 2018-10-31 DIAGNOSIS — Z12.39 SCREENING BREAST EXAMINATION: ICD-10-CM

## 2018-10-31 PROCEDURE — 77067 SCR MAMMO BI INCL CAD: CPT

## 2019-09-05 ENCOUNTER — TRANSCRIBE ORDERS (OUTPATIENT)
Dept: ADMINISTRATIVE | Facility: HOSPITAL | Age: 64
End: 2019-09-05

## 2019-09-05 DIAGNOSIS — Z12.31 VISIT FOR SCREENING MAMMOGRAM: Primary | ICD-10-CM

## 2019-11-07 ENCOUNTER — HOSPITAL ENCOUNTER (OUTPATIENT)
Dept: BONE DENSITY | Facility: IMAGING CENTER | Age: 64
Discharge: HOME | End: 2019-11-07
Payer: COMMERCIAL

## 2019-11-07 VITALS — WEIGHT: 220 LBS | HEIGHT: 68 IN | BODY MASS INDEX: 33.34 KG/M2

## 2019-11-07 DIAGNOSIS — Z12.31 VISIT FOR SCREENING MAMMOGRAM: ICD-10-CM

## 2019-11-07 PROCEDURE — 77067 SCR MAMMO BI INCL CAD: CPT

## 2020-01-09 ENCOUNTER — APPOINTMENT (EMERGENCY)
Dept: RADIOLOGY | Facility: HOSPITAL | Age: 65
End: 2020-01-09
Payer: COMMERCIAL

## 2020-01-09 ENCOUNTER — HOSPITAL ENCOUNTER (EMERGENCY)
Facility: HOSPITAL | Age: 65
Discharge: HOME | End: 2020-01-09
Attending: EMERGENCY MEDICINE | Admitting: EMERGENCY MEDICINE
Payer: COMMERCIAL

## 2020-01-09 ENCOUNTER — APPOINTMENT (EMERGENCY)
Dept: CT IMAGING | Facility: HOSPITAL | Age: 65
End: 2020-01-09
Payer: COMMERCIAL

## 2020-01-09 VITALS
DIASTOLIC BLOOD PRESSURE: 81 MMHG | OXYGEN SATURATION: 97 % | HEIGHT: 68 IN | BODY MASS INDEX: 33.34 KG/M2 | SYSTOLIC BLOOD PRESSURE: 178 MMHG | TEMPERATURE: 98 F | HEART RATE: 69 BPM | WEIGHT: 220 LBS | RESPIRATION RATE: 17 BRPM

## 2020-01-09 DIAGNOSIS — S01.81XA FACIAL LACERATION: ICD-10-CM

## 2020-01-09 DIAGNOSIS — S09.90XA CHI (CLOSED HEAD INJURY): Primary | ICD-10-CM

## 2020-01-09 DIAGNOSIS — S62.653A NONDISPLACED FRACTURE OF MIDDLE PHALANX OF LEFT MIDDLE FINGER, INITIAL ENCOUNTER FOR CLOSED FRACTURE: ICD-10-CM

## 2020-01-09 PROCEDURE — 99284 EMERGENCY DEPT VISIT MOD MDM: CPT

## 2020-01-09 PROCEDURE — 72125 CT NECK SPINE W/O DYE: CPT

## 2020-01-09 PROCEDURE — 12052 INTMD RPR FACE/MM 2.6-5.0 CM: CPT | Performed by: EMERGENCY MEDICINE

## 2020-01-09 PROCEDURE — 70450 CT HEAD/BRAIN W/O DYE: CPT

## 2020-01-09 PROCEDURE — 73080 X-RAY EXAM OF ELBOW: CPT

## 2020-01-09 PROCEDURE — 73140 X-RAY EXAM OF FINGER(S): CPT

## 2020-01-09 PROCEDURE — 99284 EMERGENCY DEPT VISIT MOD MDM: CPT | Performed by: EMERGENCY MEDICINE

## 2020-01-09 PROCEDURE — 73030 X-RAY EXAM OF SHOULDER: CPT

## 2020-01-09 RX ORDER — LIDOCAINE HYDROCHLORIDE AND EPINEPHRINE 10; 10 MG/ML; UG/ML
10 INJECTION, SOLUTION INFILTRATION; PERINEURAL ONCE
Status: COMPLETED | OUTPATIENT
Start: 2020-01-09 | End: 2020-01-09

## 2020-01-09 RX ADMIN — LIDOCAINE HYDROCHLORIDE,EPINEPHRINE BITARTRATE 10 ML: 10; .01 INJECTION, SOLUTION INFILTRATION; PERINEURAL at 07:52

## 2020-01-09 NOTE — ED PROVIDER NOTES
H&P Exam - Trauma   Lacie Herrera 75 y o  female MRN: [id number]  Unit/Bed#: ED 04/ED 04 Encounter: [id number]    Assessment/Plan   Trauma Alert: Trauma Acuity: Trauma Evaluation  Model of Arrival: Mode of Arrival: Direct from scene via    Trauma Team: Current Providers  Attending Provider: Albino Crawford DO  Registered Nurse: Tara Gomez RN  Consultants: None    Trauma Active Problems and Plan:     1  Fall, head injury on pradaxa   -mechanical, ct head/cervical spine read as negative    2  Right arm pain   -xray shoulder negative    -xray elbow negative    3  Left middle finger pain   -proximal interphalangeal joint intra-articular fracture    4  Facial laceration   -repaired with simple interrupted sutures          Chief Complaint:   Chief Complaint   Patient presents with    Fall     fall on pradaxa  Tripped over shoes and fell and hit face  right side pain  No loc       History of Present Illness   HPI:  Lacie Herrera is a 75 y o  female who presents with a fall fell onto a right-sided after tripping over shoes  Struck her face and her right arm and left middle finger  No loss of conscious  Moderate pain  Is on Pradaxa  No chest pain or shortness of breath no other associated symptoms modifying factors  Mechanism:Details of Incident: patient states she tripped over her shoe lace while taking out the dog this morning, states she has right cheek bone pain, and right arm pain, states minor pain in knees and palms  has laceration under right eye  Injury Date: 01/09/20 Injury Time: 0600      HPI  Review of Systems   Constitutional: Negative for diaphoresis, fatigue and fever  HENT: Negative for facial swelling and nosebleeds  Eyes: Negative for pain and visual disturbance  Respiratory: Negative for apnea, cough, shortness of breath and wheezing  Cardiovascular: Negative for chest pain and leg swelling     Gastrointestinal: Negative for abdominal distention, abdominal pain, anal bleeding, blood in stool, nausea, rectal pain and vomiting  Genitourinary: Negative for difficulty urinating, dysuria and flank pain  Musculoskeletal: Negative for back pain, neck pain and neck stiffness  Neurological: Negative for dizziness, syncope, weakness, light-headedness and headaches  All other systems reviewed and are negative  Historical Information     Immunizations: There is no immunization history on file for this patient      Past Medical History:   Diagnosis Date    Arthritis 2016    Cardiac disease     afib    [facility]     GERD (gastroesophageal reflux disease)     Hypertension 2016    Hypertensive urgency 2016    Hypokalemia 2016    Hypomagnesemia 2016    [facility] ) 2016    Psychiatric disorder     anxiety    SOB (shortness of breath) 2016       Family History   Problem Relation Age of Onset    No Known Problems Mother     No Known Problems Father     No Known Problems Sister     Ovarian cancer Daughter 36    No Known Problems Maternal Grandmother     No Known Problems Maternal Grandfather     No Known Problems Paternal Grandmother     No Known Problems Paternal Grandfather     Ovarian cancer Daughter 40    No Known Problems Maternal Aunt     No Known Problems Maternal Aunt     No Known Problems Maternal Aunt     No Known Problems Maternal Aunt     No Known Problems Paternal Aunt     No Known Problems Paternal Aunt     Pancreatic cancer Cousin         29's     Past Surgical History:   Procedure Laterality Date     SECTION      CHOLECYSTECTOMY      KIDNEY STONE SURGERY      OOPHORECTOMY Left     pt not sure but thinks 1       Social History     Socioeconomic History    Marital status: /Civil Union     Spouse name: None    Number of children: None    Years of education: None    Highest education level: None   Occupational History    None   Social Needs    Financial resource strain: None    Food insecurity:     Worry: None     Inability: None    Transportation needs:     Medical: None     Non-medical: None   Tobacco Use    Smoking status: Former Smoker    Smokeless tobacco: Never Used    Tobacco comment: quit over 40 years ago   Substance and Sexual Activity    Alcohol use: No    Drug use: No    Sexual activity: None   Lifestyle    Physical activity:     Days per week: None     Minutes per session: None    Stress: None   Relationships    Social connections:     Talks on phone: None     Gets together: None     Attends Yarsanism service: None     Active member of club or organization: None     Attends meetings of clubs or organizations: None     Relationship status: None    Intimate partner violence:     Fear of current or ex partner: None     Emotionally abused: None     Physically abused: None     Forced sexual activity: None   Other Topics Concern    None   Social History Narrative    None       Family History: non-contributory    Meds/Allergies   Prior to Admission Medications   Prescriptions Last Dose Informant Patient Reported? Taking? ALPRAZolam (XANAX) 0 25 mg tablet  Self Yes No   Si tablet 2 (two) times a day as needed  dabigatran etexilate (PRADAXA) 150 mg capsu  Self Yes No   Sig: Take 150 mg by mouth 2 (two) times a day   diltiazem (CARDIZEM SR) 120 mg 12 hr capsule   No No   Sig: Take 1 capsule (120 mg total) by mouth 2 (two) times a day   losartan (COZAAR) 100 MG tablet   No No   Sig: Take 1 tablet (100 mg total) by mouth daily   metoprolol tartrate (LOPRESSOR) 25 mg tablet  Self Yes No   Si mg 2 (two) times a day     pantoprazole (PROTONIX) 40 mg tablet  Self Yes No   Si tablet daily        Facility-Administered Medications: None       Allergies   Allergen Reactions    Flagyl [Metronidazole] Anaphylaxis and Rash    Hydromorphone Anaphylaxis    Morphine Anaphylaxis, Rash and GI Intolerance    Ciprofloxacin GI Intolerance    Clindamycin Other (See Comments)    Dilaudid [Hydromorphone Hcl]     Nexium  [Esomeprazole Magnesium] Diarrhea, GI Intolerance and Other (See Comments)    Pantoprazole Diarrhea and GI Intolerance    Amlodipine Rash       PHYSICAL EXAM    PE limited by: none    Objective   Vitals:   First set: Temperature: 98 °F (36 7 °C) (01/09/20 0734)  Pulse: 69 (01/09/20 0722)  Respirations: 20 (01/09/20 0722)  Blood Pressure: (!) 220/90 (01/09/20 0722)  SpO2: 98 % (01/09/20 0722)    Primary Survey:   (A) Airway:  Intact  (B) Breathing:  Clear to auscultation bilaterally  (C) Circulation: Pulses:   normal  (D) Disabliity:  GCS Total:  15  (E) Expose:  Completed    Secondary Survey: (Click on Physical Exam tab above)  Physical Exam   Constitutional: She is oriented to person, place, and time  She appears well-developed and well-nourished  No distress  HENT:   Head: Normocephalic  Nose: Nose normal    5 cm infraorbital laceration on the right   Eyes: Pupils are equal, round, and reactive to light  Conjunctivae and EOM are normal  No scleral icterus  Neck: Normal range of motion  Neck supple  No JVD present  No tracheal deviation present  No thyromegaly present  Cardiovascular: Normal rate, regular rhythm, normal heart sounds and intact distal pulses  Exam reveals no gallop and no friction rub  Pulmonary/Chest: Effort normal and breath sounds normal  No respiratory distress  She has no wheezes  She has no rales  She exhibits no tenderness  Abdominal: Soft  Bowel sounds are normal  She exhibits no distension and no mass  There is no tenderness  There is no rebound and no guarding  No hernia  Musculoskeletal: Normal range of motion  She exhibits tenderness (Left middle finger)  She exhibits no edema or deformity  Neurological: She is alert and oriented to person, place, and time  She has normal reflexes  No cranial nerve deficit  Coordination normal    Skin: Skin is warm and dry  She is not diaphoretic   No erythema  Psychiatric: She has a normal mood and affect  Her behavior is normal    Nursing note and vitals reviewed  Invasive Devices     None                 Lab Results:   Results Reviewed     None                 Imaging Studies:   Direct to CT: No  XR shoulder 2+ views RIGHT   Final Result by Rosie Ponce DO (01/09 0607)   No acute osseous abnormality  Workstation performed: [id number]         XR elbow 3+ vw RIGHT   Final Result by Rosie Ponce DO (01/09 0886)   No acute osseous abnormality  Workstation performed: [id number]         XR finger third digit-middle LEFT   Final Result by Rosie Ponce DO (01/09 8977)   Nondisplaced Comminuted intra-articular fracture involving the palmar base middle phalanx of 3rd digit, best seen on lateral view  There is mild local soft tissue swelling as well  The study was marked in [facility] for immediate notification  Workstation performed: [id number]         CT head without contrast   Final Result by Carly Dewey MD (01/09 8443)      No acute intracranial abnormality  Workstation performed: [id number]         CT spine cervical without contrast   Final Result by Carly Dewey MD (01/09 3690)      No cervical spine fracture or traumatic malalignment  Workstation performed: [id number]             Other Studies: none    Code Status: Prior  Advance Directive and Living Will:      Power of :    POLST:      Procedures  Laceration repair  Date/Time: 1/9/2020 8:39 AM  Performed by: Jade Quiroga DO  Authorized by: Jade Quiroga DO   Consent: Verbal consent obtained    Risks and benefits: risks, benefits and alternatives were discussed  Patient understanding: patient states understanding of the procedure being performed  Patient identity confirmed: verbally with patient  Body area: head/neck  Location details: right cheek  Laceration length: 5 cm  Tendon involvement: none  Nerve involvement: none  Vascular damage: no  Anesthesia: local infiltration    Anesthesia:  Local Anesthetic: lidocaine 1% with epinephrine  Anesthetic total: 5 mL    Sedation:  Patient sedated: no        Procedure Details:  Irrigation solution: saline  Irrigation method: syringe  Amount of cleaning: extensive  Debridement: none  Degree of undermining: none  Skin closure: 5-0 nylon  Number of sutures: 5  Technique: simple  Approximation: close  Approximation difficulty: simple  Patient tolerance: Patient tolerated the procedure well with no immediate complications               ED Course         MDM  Number of Diagnoses or Management Options  CHI (closed head injury): new and requires workup  Facial laceration: new and requires workup  Nondisplaced fracture of middle phalanx of left middle finger, initial encounter for closed fracture: new and requires workup     Amount and/or Complexity of Data Reviewed  Clinical lab tests: reviewed and ordered  Tests in the radiology section of CPT®: reviewed and ordered  Tests in the medicine section of CPT®: ordered and reviewed  Independent visualization of images, tracings, or specimens: yes    Patient Progress  Patient progress: stable        Disposition  [facility] Transfer: No  Final diagnoses:   CHI (closed head injury)   Facial laceration   Nondisplaced fracture of middle phalanx of left middle finger, initial encounter for closed fracture     Time reflects when diagnosis was documented in both MDM as applicable and the Disposition within this note     Time User Action Codes Description Comment    1/9/2020  9:49 AM Sita Taylor Add [S09 90XA] CHI (closed head injury)     1/9/2020  9:49 AM Sasha DIA Add [S01 81XA] Facial laceration     1/12/2020  6:20 AM Sita Taylor Add [S62 653A] Nondisplaced fracture of middle phalanx of left middle finger, initial encounter for closed fracture       ED Disposition     ED Disposition Condition Date/Time Comment    Discharge Stable u Jan 9, 2020  9:49 AM Lora Thomas discharge to home/self care  Follow-up Information     Follow up With Specialties Details Why Contact Info Additional Information    [address] Now [facility] In 4 days For suture removal [address], [address] Kathy Jenkins MD Orthopedic Surgery, Hand Surgery Schedule an appointment as soon as possible for a visit   [address] [place] [address]  [phone]           Discharge Medication List as of 1/9/2020  9:51 AM      CONTINUE these medications which have NOT CHANGED    Details   ALPRAZolam (XANAX) 0 25 mg tablet 1 tablet 2 (two) times a day as needed   , Historical Med      dabigatran etexilate (PRADAXA) 150 mg capsu Take 150 mg by mouth 2 (two) times a day, Historical Med      diltiazem (CARDIZEM SR) 120 mg 12 hr capsule Take 1 capsule (120 mg total) by mouth 2 (two) times a day, Starting Mon 3/19/2018, Normal      losartan (COZAAR) 100 MG tablet Take 1 tablet (100 mg total) by mouth daily, Starting Wed 4/4/2018, Normal      metoprolol tartrate (LOPRESSOR) 25 mg tablet 100 mg 2 (two) times a day  , Historical Med      pantoprazole (PROTONIX) 40 mg tablet 1 tablet daily  , Historical Med           No discharge procedures on file        ED Provider  Electronically Signed by         Debbie Tolliver DO  01/12/20 9171

## 2021-03-15 ENCOUNTER — APPOINTMENT (OUTPATIENT)
Dept: RADIOLOGY | Facility: CLINIC | Age: 66
End: 2021-03-15
Payer: MEDICARE

## 2021-03-15 DIAGNOSIS — K21.9 GASTRIC REFLUX: ICD-10-CM

## 2021-03-15 DIAGNOSIS — R07.9 CHEST PAIN, UNSPECIFIED: ICD-10-CM

## 2021-03-15 PROCEDURE — 71046 X-RAY EXAM CHEST 2 VIEWS: CPT

## 2021-03-26 ENCOUNTER — CONSULT (OUTPATIENT)
Dept: GASTROENTEROLOGY | Facility: CLINIC | Age: 66
End: 2021-03-26
Payer: MEDICARE

## 2021-03-26 ENCOUNTER — TELEPHONE (OUTPATIENT)
Dept: GASTROENTEROLOGY | Facility: CLINIC | Age: 66
End: 2021-03-26

## 2021-03-26 VITALS
SYSTOLIC BLOOD PRESSURE: 152 MMHG | HEART RATE: 67 BPM | DIASTOLIC BLOOD PRESSURE: 84 MMHG | BODY MASS INDEX: 34.36 KG/M2 | WEIGHT: 226 LBS

## 2021-03-26 DIAGNOSIS — Z79.01 CHRONIC ANTICOAGULATION: ICD-10-CM

## 2021-03-26 DIAGNOSIS — I48.91 NEW ONSET A-FIB (HCC): Chronic | ICD-10-CM

## 2021-03-26 DIAGNOSIS — Z12.11 COLON CANCER SCREENING: ICD-10-CM

## 2021-03-26 DIAGNOSIS — K21.9 GASTROESOPHAGEAL REFLUX DISEASE, UNSPECIFIED WHETHER ESOPHAGITIS PRESENT: Primary | Chronic | ICD-10-CM

## 2021-03-26 DIAGNOSIS — R13.10 DYSPHAGIA, UNSPECIFIED TYPE: ICD-10-CM

## 2021-03-26 DIAGNOSIS — R11.0 NAUSEA: ICD-10-CM

## 2021-03-26 PROCEDURE — 99204 OFFICE O/P NEW MOD 45 MIN: CPT | Performed by: NURSE PRACTITIONER

## 2021-03-26 RX ORDER — FAMOTIDINE 40 MG/1
40 TABLET, FILM COATED ORAL 2 TIMES DAILY
COMMUNITY

## 2021-03-26 NOTE — PROGRESS NOTES
[id number] - Outpatient Consultation  Charlene Lozano 77 y o  female MRN: [id number]  Encounter: [id number]    ASSESSMENT AND PLAN:      1  Dysphagia, unspecified type   Previous episodes of solid food dysphagia with previous esophageal dilations with GI at [facility]  And has worsened over the past 3 months from once a week to daily over the past 3-4 weeks  Issues with food, pills sticking, go down slowly with water  Occasional regurgitation, no trouble with liquids  Differential considerations include stricture, ring, esophagitis, dysmotility up to and including achalasia as the patient recalls her last endoscopy was told that her LES was "stuck open  "    - reviewed/advised swallowing safety  - schedule EGD with possible dilation-BM EC    2  Gastroesophageal reflux disease, unspecified whether esophagitis present   longstanding history of heartburn and GERD, recently worsened over the past 3-4 weeks with the dysphagia  Patient has significant intolerances to all PPIs, unable to take, with the exception of brand Protonix which is cost prohibitive  Currently on famotidine 40 mg b i d  which "takes the edge off" her symptoms  Cannot completely exclude PUD, gastritis, esophagitis but suspect the severity of her GERD symptoms may be related to LES dysfunction up to and including achalasia as well as possibly diabetic gastroparesis with retained food providing more opportunity for reflux     -  Reviewed/ advised reflux diet, lifestyle modifications to continue including head elevationat night  - advised smaller, more frequent meals  - continue famotidine 40 mg b i d  for now  - schedule EGD-BM EC  - check NM gastric emptying; Future    3  Nausea   persistent daily nausea, no vomiting  Suspect diabetic gastroparesis as etiology with associated reflux, decreased appetite    - check NM gastric emptying;  Future  - advised smaller, more frequent meals  - if GES abnormal would trial Reglan but start at 5 mg b i d , titrate very slowly given her long history of multiple drug intolerances    4  Colon cancer screening   Average risk  Reports her last colonoscopy was a few years ago with GI at [facility]  She cannot recall exactly when and unsure of recall  5  New onset a-fib ([address] )  6  Chronic anticoagulation    Maintained on Pradaxa for paroxysmal atrial fibrillation  Reviewed the very small risk of thromboembolic event while holding the   Pradaxa dante procedure  The patient understands the risks and benefits of holding the medication and agrees to proceed pending approval of the prescribing provider,  Dr Lenny Andrew  Followup Appointment: Two-months  ______________________________________________________________________    Chief Complaint   Patient presents with    GERD     sx worsening; referred by Arnel Valente    Dysphagia       HPI:   Lizz Alvarez is a 77 y o  female who presents, accompanied by her  for evaluation of dysphagia and GERD  Reports a longstanding history of both, previously following with GI at [facility]  Has had multiple endoscopic evaluation and describes the most recent as reporting that her LES was "stuck open  "  Has noted progressive solid food and pill dysphagia over the past 3 months increasing from once per week to daily 3-4 weeks ago  The food or pills will go down slowly, drinking some water helps  Occasional regurgitation has occurred  No trouble with liquids  She cannot identify any specific foods that stick, it can be any food  She also reports worsening of GERD described as burning chest pain, epigastric pressure which is worst when she bends over  Intolerance is to all PPI as with the exception of brand name Protonix but this is cost prohibitive  Currently she is maintained on famotidine 40 mg b i d  which she describes as taking the "edge off" her symptoms but does not provide relief    She does eat smaller meals, often eats lasts as her appetite is sometimes diminished  Denies odynophagia, early satiety, vomiting  Generally has 1 formed stool daily  Stools will become loose when she is "nervous  " denies melena, hematochezia, lower abdominal or rectal pain  Denies any unintentional weight loss      Historical Information   Past Medical History:   Diagnosis Date    Anemia     As a child    Anxiety     Arthritis 2016    Cardiac disease     afib    Colon cancer ([facility] )     Coronary artery disease     Diabetes mellitus ([facility] )     Diverticulitis of colon     Fatty liver     GERD (gastroesophageal reflux disease)     Hypertension 2016    Hypertensive urgency 2016    Hypokalemia 2016    Hypomagnesemia 2016    Nephrolithiasis     New onset a-fib ([facility] ) 2016    Psychiatric disorder     anxiety    SOB (shortness of breath) 2016    Thyroid nodule      Past Surgical History:   Procedure Laterality Date    AV NODE ABLATION       SECTION      CHOLECYSTECTOMY      COLONOSCOPY      KIDNEY STONE SURGERY      OOPHORECTOMY Left     pt not sure but thinks 1    UPPER GASTROINTESTINAL ENDOSCOPY       Social History     Substance and Sexual Activity   Alcohol Use Never    Frequency: Never    Binge frequency: Never     Social History     Substance and Sexual Activity   Drug Use Never     Social History     Tobacco Use   Smoking Status Never Smoker   Smokeless Tobacco Never Used     Family History   Problem Relation Age of Onset    No Known Problems Mother     No Known Problems Father     No Known Problems Sister     Ovarian cancer Daughter 36    ALONZO disease Daughter     No Known Problems Maternal Grandmother     No Known Problems Maternal Grandfather     No Known Problems Paternal Grandmother     No Known Problems Paternal Grandfather     Ovarian cancer Daughter 40    No Known Problems Maternal Aunt     No Known Problems Maternal Aunt     No Known Problems Maternal Aunt     No Known Problems Maternal Aunt     No Known Problems Paternal Aunt     No Known Problems Paternal Aunt     Pancreatic cancer Cousin         29's    ALONZO disease Son     No Known Problems Brother     Colon cancer Neg Hx     Colon polyps Neg Hx        Meds/Allergies     Current Outpatient Medications:     ALPRAZolam (XANAX) 0 25 mg tablet    dabigatran etexilate (PRADAXA) 150 mg capsu    famotidine (PEPCID) 40 MG tablet    losartan (COZAAR) 100 MG tablet    metoprolol tartrate (LOPRESSOR) 25 mg tablet    diltiazem (CARDIZEM SR) 120 mg 12 hr capsule    Allergies   Allergen Reactions    Dilaudid [Hydromorphone Hcl] Anaphylaxis    Flagyl [Metronidazole] Anaphylaxis and Rash    Hydromorphone Anaphylaxis    Morphine Anaphylaxis, Rash and GI Intolerance    Amlodipine Rash    Ciprofloxacin GI Intolerance    Clindamycin Other (See Comments)     C diff    Dexilant [Dexlansoprazole] Other (See Comments)     Unknown per pt    Metformin Hydrochloride-Repaglinide [Repaglinide-Metformin Hcl] Other (See Comments)     Unknown per pt      Nexium [Esomeprazole Magnesium] Diarrhea and GI Intolerance    Pantoprazole Diarrhea and GI Intolerance       PHYSICAL EXAM:    Blood pressure 152/84, pulse 67, weight 103 kg (226 lb)  Body mass index is 34 36 kg/m²  General Appearance: NAD, cooperative, alert  Head:  Normocephalic, atraumatic  Eyes: Anicteric, PERRLA, EOMI  ENT:  Normal external appearance, normal mucosa  Neck:  Supple, symmetrical, trachea midline,   Resp:  Clear to auscultation bilaterally; no rales, rhonchi or wheezing; respirations unlabored   CV:  S1 S2, Regular rate and rhythm; no murmur, rub, or gallop  GI:  Soft, epigastric area abdominal tenderness with mild guarding but no rebound, some mild generalized abdominal tenderness without guarding or rebound with palpation, non-distended; normal bowel sounds; no masses, no organomegaly   Rectal: Deferred  Musculoskeletal: No cyanosis, clubbing or edema  Normal ROM  Skin:  No jaundice, rashes, or lesions   Heme/Lymph: No palpable cervical lymphadenopathy  Psych: Normal affect, good eye contact  Neuro: No gross deficits, AAOx3    Lab Results:   Lab Results   Component Value Date    WBC 7 15 06/29/2018    HGB 15 4 06/29/2018    HCT 46 2 (H) 06/29/2018    MCV 90 06/29/2018     06/29/2018     Lab Results   Component Value Date     12/11/2015    K 3 7 06/29/2018     06/29/2018    CO2 28 06/29/2018    ANIONGAP 9 12/11/2015    BUN 14 06/29/2018    CREATININE 1 08 06/29/2018    GLUCOSE 120 12/11/2015    CALCIUM 8 9 06/29/2018    AST 31 06/29/2018    ALT 42 06/29/2018    ALKPHOS 155 (H) 06/29/2018    EGFR 54 06/29/2018     No results found for: IRON, TIBC, FERRITIN  Lab Results   Component Value Date    LIPASE 458 (H) 06/26/2016       Radiology Results:   Xr Chest Pa & Lateral    Result Date: 3/18/2021  Narrative: CHEST INDICATION:   R07 9: Chest pain, unspecified K21 9: Gastro-esophageal reflux disease without esophagitis  Question aspiration  COMPARISON:  Chest radiograph from 8/21/2017  EXAM PERFORMED/VIEWS:  XR CHEST PA & LATERAL  FINDINGS: Cardiomediastinal silhouette normal  Lungs clear  No effusion or pneumothorax  Cholecystectomy  Mild degenerative disease in the spine  Impression: No acute cardiopulmonary disease  Nothing to suggest aspiration  Workstation performed: [id number]         REVIEW OF SYSTEMS:    CONSTITUTIONAL: Denies any fever, chills, rigors,fatigue, weight loss  HEENT: No earache or tinnitus  Denies hearing loss or visual disturbances  CARDIOVASCULAR: No chest pain or palpitations  RESPIRATORY: Denies any cough, hemoptysis, shortness of breath or dyspnea on exertion  GASTROINTESTINAL: As noted in the History of Present Illness  GENITOURINARY: No problems with urination  Denies any hematuria or dysuria  NEUROLOGIC: No dizziness or vertigo, denies headaches  MUSCULOSKELETAL: Denies any muscle or joint pain     SKIN: Denies skin rashes or itching  ENDOCRINE: Denies excessive thirst  Denies intolerance to heat or cold  PSYCHOSOCIAL: Denies depression or increased anxiety  Denies any recent memory loss

## 2021-03-26 NOTE — PATIENT INSTRUCTIONS
Continue taking your famotidine 40 mg 2 times per day   take small bites, cut food into small pieces, chew thoroughly, eat slowly   definitely smaller, more frequent meals and do not eat within 2-3 hours of going to bed  Continue elevating the head of your bed at night  Continue to minimize fatty/fried foods, chocolate, caffeine, alcohol, NSAIDs ( ibuprofen /Motrin / Advil, Aleve /naproxen, full-dose aspirin)   try sipping some room temperature water before you eat or take pills to moisten your throat   try alternating a bite of food with sips of liquid   complete the gastric emptying study  You will be scheduled for upper scope here at our endoscopy center   We will contact Dr Martinez Horvaht about stopping the Pradaxa before your procedure and someone will get back to you with further instructions

## 2021-03-26 NOTE — TELEPHONE ENCOUNTER
Estella from [facility], [facility] office called, she would like patient in sooner than 5/26/2021   Please call Estella at [place] [phone]

## 2021-03-29 ENCOUNTER — TELEPHONE (OUTPATIENT)
Dept: GASTROENTEROLOGY | Facility: CLINIC | Age: 66
End: 2021-03-29

## 2021-03-29 NOTE — TELEPHONE ENCOUNTER
Clearance rec'd; 2 day pradaxa hold ok per Dr Rosita Martin  Form scanned into chart and put in prep line up

## 2021-04-01 ENCOUNTER — TRANSCRIBE ORDERS (OUTPATIENT)
Dept: ADMINISTRATIVE | Facility: HOSPITAL | Age: 66
End: 2021-04-01

## 2021-04-01 DIAGNOSIS — Z12.31 ENCOUNTER FOR SCREENING MAMMOGRAM FOR MALIGNANT NEOPLASM OF BREAST: Primary | ICD-10-CM

## 2021-04-01 DIAGNOSIS — Z13.820 ENCOUNTER FOR SCREENING FOR OSTEOPOROSIS: ICD-10-CM

## 2021-04-06 ENCOUNTER — TELEPHONE (OUTPATIENT)
Dept: GASTROENTEROLOGY | Facility: CLINIC | Age: 66
End: 2021-04-06

## 2021-04-06 DIAGNOSIS — R19.7 DIARRHEA OF PRESUMED INFECTIOUS ORIGIN: Primary | ICD-10-CM

## 2021-04-06 NOTE — TELEPHONE ENCOUNTER
Stool studies changed to [facility] orders  Patient advised as per Bryant

## 2021-04-06 NOTE — TELEPHONE ENCOUNTER
Reviewed records  I entered orders for stool studies for diarrhea  Okay to use Imodium until results are available  Remainder of workup per Vicenta's note

## 2021-04-06 NOTE — TELEPHONE ENCOUNTER
Pt states she was here 3/26; has been extremely sick since w/ severe diarrhea; has lost 5-6 lbs since appt; spoke w/ Primary who advised to call GI  Also, has A fib but feels shaky inside; heart rate is down to the 40's; will be calling cardiologist next  CB today if possible [phone]

## 2021-04-06 NOTE — TELEPHONE ENCOUNTER
Spoke with patient  I asked her about the diarrhea as this was not documented at [facility] and she states she told us that she was having formed stool daily prior to "getting sick"  Pt reports that "getting sick" was around the beginning of March and since then has been having loose, brown, sometimes green, watery stools, about 4-5 per day  She eats and it goes right through  No fever  Chills on occasion  Nausea on and off  No vomiting  Has lost 5 lbs since OV on 3/26/2021  Is scheduled for GES on 2021 and EGD 21 but this "feels like forever" since she is feeling so miserable  I asked about recent AB (no recent AB)  and she states she does not have c diff  Apparently had "bad c diff" and almost  and knows how that would feel

## 2021-04-07 LAB
CREAT ?TM UR-SCNC: 325.1 UMOL/L
CREAT ?TM UR-SCNC: 325.1 UMOL/L
EXT MICROALBUMIN URINE RANDOM: 64.5
EXT MICROALBUMIN URINE RANDOM: 64.5
HBA1C MFR BLD HPLC: 8.9 %
HBA1C MFR BLD HPLC: 8.9 %
MICROALBUMIN/CREAT UR: 20 MG/G{CREAT}
MICROALBUMIN/CREAT UR: 20 MG/G{CREAT}

## 2021-04-13 VITALS — WEIGHT: 221 LBS | BODY MASS INDEX: 33.49 KG/M2 | HEIGHT: 68 IN

## 2021-04-13 NOTE — TELEPHONE ENCOUNTER
Why does your doctor want you to have this procedure? Dysphagia    Do you have kidney disease?  no  If yes, are you on dialysis :     Have you had diverticulitis within the past 2 months? no    Are you diabetic?  yes  If yes, insulin dependent: NIDDM  If yes, provide diabetic instructions sheet     Do take iron supplements?  no  If yes, instruct patient to hold iron supplement for 7 days prior    Are you on a blood thinner? Yes   Was the blood thinner sheet complete and faxed to cardiologist yes  Plavix (clopidogrel), Coumadin (warfarin), Lovenox (enoxaparin), Xarelto (rivaroxaban), Pradaxa(dabigatran), Eliquis(apixaban) Savaysa/Lixiana (edoxapan)    Do you have an automatic implantable cardiac defibrillator (AICD)/pacemaker ([facility])? no  Was AICD/pacemaker sheet completed and faxed to cardiologist? no    Are you on home oxygen? no  If yes, continuous or nocturnal:     Have you been treated for MRSA, VRE or any communicable diseases? no    Heart attack, stroke, or stent within 3 months? no  Schedule at Hospital if within 3-6 months   Use nitroglycerin for chest pain in the last 6 months? no    History of organ  transplant?  no   If yes, notify Endo      History of neck/throat/tongue surgery or cancer? no  IF yes, notify Endo      Any problems with anesthesia in the past? Sometimes Nausea     Was stool C diff ordered?  no Stool specimen needs to be completed prior to procedure    Do have any facial or body piercings?no     Do you have a latex allergy? no    Do have an allergy to metals? (Bravo study only) no     If pediatric patient, was consent faxed to pediatrician no     Patient rights reviewed yes     EGD phone prep completed; instructions reviewed and emailed to pt; confirmed last dose of pradaxa 4/17; pt verbalized understanding

## 2021-04-16 ENCOUNTER — HOSPITAL ENCOUNTER (OUTPATIENT)
Dept: NUCLEAR MEDICINE | Facility: HOSPITAL | Age: 66
Discharge: HOME | End: 2021-04-16
Payer: MEDICARE

## 2021-04-16 DIAGNOSIS — R11.0 NAUSEA: ICD-10-CM

## 2021-04-16 DIAGNOSIS — K21.9 GASTROESOPHAGEAL REFLUX DISEASE, UNSPECIFIED WHETHER ESOPHAGITIS PRESENT: Chronic | ICD-10-CM

## 2021-04-16 PROCEDURE — 78264 GASTRIC EMPTYING IMG STUDY: CPT

## 2021-04-16 PROCEDURE — G1004 CDSM NDSC: HCPCS

## 2021-04-16 PROCEDURE — A9541 TC99M SULFUR COLLOID: HCPCS

## 2021-04-19 ENCOUNTER — ANESTHESIA EVENT (OUTPATIENT)
Dept: GASTROENTEROLOGY | Facility: AMBULATORY SURGERY CENTER | Age: 66
End: 2021-04-19

## 2021-04-20 ENCOUNTER — TELEPHONE (OUTPATIENT)
Dept: GASTROENTEROLOGY | Facility: CLINIC | Age: 66
End: 2021-04-20

## 2021-04-20 ENCOUNTER — HOSPITAL ENCOUNTER (OUTPATIENT)
Dept: GASTROENTEROLOGY | Facility: AMBULATORY SURGERY CENTER | Age: 66
Discharge: HOME | End: 2021-04-20
Payer: MEDICARE

## 2021-04-20 ENCOUNTER — ANESTHESIA (OUTPATIENT)
Dept: GASTROENTEROLOGY | Facility: AMBULATORY SURGERY CENTER | Age: 66
End: 2021-04-20

## 2021-04-20 VITALS
OXYGEN SATURATION: 97 % | HEART RATE: 58 BPM | TEMPERATURE: 98.6 F | RESPIRATION RATE: 24 BRPM | DIASTOLIC BLOOD PRESSURE: 67 MMHG | SYSTOLIC BLOOD PRESSURE: 152 MMHG

## 2021-04-20 DIAGNOSIS — R13.10 DYSPHAGIA, UNSPECIFIED TYPE: ICD-10-CM

## 2021-04-20 DIAGNOSIS — R11.0 NAUSEA: ICD-10-CM

## 2021-04-20 DIAGNOSIS — K21.9 GASTROESOPHAGEAL REFLUX DISEASE, UNSPECIFIED WHETHER ESOPHAGITIS PRESENT: ICD-10-CM

## 2021-04-20 PROBLEM — E11.9 DIABETES MELLITUS, TYPE 2 (HCC): Status: ACTIVE | Noted: 2021-04-20

## 2021-04-20 PROBLEM — E66.9 OBESITY (BMI 30.0-34.9): Status: ACTIVE | Noted: 2021-04-20

## 2021-04-20 PROBLEM — I25.10 CAD (CORONARY ARTERY DISEASE): Status: ACTIVE | Noted: 2021-04-20

## 2021-04-20 PROBLEM — F41.9 ANXIETY: Status: ACTIVE | Noted: 2021-04-20

## 2021-04-20 PROBLEM — E66.811 OBESITY (BMI 30.0-34.9): Status: ACTIVE | Noted: 2021-04-20

## 2021-04-20 PROBLEM — C18.9 COLON CANCER (HCC): Status: ACTIVE | Noted: 2021-04-20

## 2021-04-20 PROCEDURE — 43239 EGD BIOPSY SINGLE/MULTIPLE: CPT | Performed by: INTERNAL MEDICINE

## 2021-04-20 PROCEDURE — 88305 TISSUE EXAM BY PATHOLOGIST: CPT | Performed by: PATHOLOGY

## 2021-04-20 PROCEDURE — 43450 DILATE ESOPHAGUS 1/MULT PASS: CPT | Performed by: INTERNAL MEDICINE

## 2021-04-20 RX ORDER — PROPOFOL 10 MG/ML
INJECTION, EMULSION INTRAVENOUS AS NEEDED
Status: DISCONTINUED | OUTPATIENT
Start: 2021-04-20 | End: 2021-04-20

## 2021-04-20 RX ORDER — HYDRALAZINE HYDROCHLORIDE 20 MG/ML
5 INJECTION INTRAMUSCULAR; INTRAVENOUS
Status: CANCELLED | OUTPATIENT
Start: 2021-04-20

## 2021-04-20 RX ORDER — ONDANSETRON 2 MG/ML
4 INJECTION INTRAMUSCULAR; INTRAVENOUS ONCE AS NEEDED
Status: CANCELLED | OUTPATIENT
Start: 2021-04-20

## 2021-04-20 RX ORDER — SODIUM CHLORIDE 9 MG/ML
50 INJECTION, SOLUTION INTRAVENOUS CONTINUOUS
Status: DISCONTINUED | OUTPATIENT
Start: 2021-04-20 | End: 2021-04-24 | Stop reason: HOSPADM

## 2021-04-20 RX ORDER — METOCLOPRAMIDE HYDROCHLORIDE 5 MG/ML
10 INJECTION INTRAMUSCULAR; INTRAVENOUS ONCE AS NEEDED
Status: CANCELLED | OUTPATIENT
Start: 2021-04-20

## 2021-04-20 RX ORDER — MEPERIDINE HYDROCHLORIDE 50 MG/ML
12.5 INJECTION INTRAMUSCULAR; INTRAVENOUS; SUBCUTANEOUS
Status: CANCELLED | OUTPATIENT
Start: 2021-04-20

## 2021-04-20 RX ORDER — LABETALOL 20 MG/4 ML (5 MG/ML) INTRAVENOUS SYRINGE
5
Status: CANCELLED | OUTPATIENT
Start: 2021-04-20

## 2021-04-20 RX ORDER — GLIPIZIDE 5 MG/1
2.5 TABLET ORAL DAILY
COMMUNITY

## 2021-04-20 RX ORDER — FENTANYL CITRATE/PF 50 MCG/ML
12.5 SYRINGE (ML) INJECTION
Status: CANCELLED | OUTPATIENT
Start: 2021-04-20

## 2021-04-20 RX ADMIN — SODIUM CHLORIDE: 9 INJECTION, SOLUTION INTRAVENOUS at 10:45

## 2021-04-20 RX ADMIN — PROPOFOL 30 MG: 10 INJECTION, EMULSION INTRAVENOUS at 11:01

## 2021-04-20 RX ADMIN — PROPOFOL 30 MG: 10 INJECTION, EMULSION INTRAVENOUS at 10:58

## 2021-04-20 RX ADMIN — PROPOFOL 100 MG: 10 INJECTION, EMULSION INTRAVENOUS at 10:53

## 2021-04-20 RX ADMIN — PROPOFOL 20 MG: 10 INJECTION, EMULSION INTRAVENOUS at 10:56

## 2021-04-20 NOTE — TELEPHONE ENCOUNTER
Pt states she had endo today/forgot to ask a ques about meds/asks when she can go back on Plavix?; states report does not say anything about resuming meds  # [phone]

## 2021-04-20 NOTE — DISCHARGE INSTRUCTIONS
Diet for Stomach Ulcers and Gastritis   WHAT YOU NEED TO KNOW:   What is a diet for stomach ulcers and gastritis? A diet for ulcers and gastritis is a meal plan that limits foods that irritate your stomach  Certain foods may worsen symptoms such as stomach pain, bloating, heartburn, or indigestion  Which foods should I limit or avoid? You may need to avoid acidic, spicy, or high-fat foods  Not all foods affect everyone the same way  You will need to learn which foods worsen your symptoms and limit those foods  The following are some foods that may worsen ulcer or gastritis symptoms:  · Beverages:      ? Whole milk and chocolate milk    ? Hot cocoa and cola    ? Any beverage with caffeine    ? Regular and decaffeinated coffee    ? Peppermint and spearmint tea    ? Green and black tea, with or without caffeine    ? Orange and grapefruit juices    ? Drinks that contain alcohol    · Spices and seasonings:      ? Black and red pepper    ? Chili powder    ? Mustard seed and nutmeg    · Other foods:      ? Dairy foods made from whole milk or cream    ? Chocolate    ? Spicy or strongly flavored cheeses, such as jalapeno or black pepper    ? Highly seasoned, high-fat meats, such as sausage, salami, lebron, ham, and cold cuts    ? Hot chiles and peppers    ? Tomato products, such as tomato paste, tomato sauce, or tomato juice    Which foods can I eat and drink? Eat a variety of healthy foods from all the food groups  Eat fruits, vegetables, whole grains, and fat-free or low-fat dairy foods  Whole grains include whole-wheat breads, cereals, pasta, and brown rice  Choose lean meats, poultry (chicken and turkey), fish, beans, eggs, and nuts  A healthy meal plan is low in unhealthy fats, salt, and added sugar  Healthy fats include olive oil and canola oil  Ask your dietitian for more information about a healthy meal plan  What other guidelines may be helpful? · Do not eat right before bedtime    Stop eating at least 2 hours before bedtime  · Eat small, frequent meals  Your stomach may tolerate small, frequent meals better than large meals  CARE AGREEMENT:   You have the right to help plan your care  Discuss treatment options with your healthcare provider to decide what care you want to receive  You always have the right to refuse treatment  The above information is an  only  It is not intended as medical advice for individual conditions or treatments  Talk to your doctor, nurse or pharmacist before following any medical regimen to see if it is safe and effective for you  © Copyright [address] Information is for End User's use only and may not be sold, redistributed or otherwise used for commercial purposes  All illustrations and images included in CareNotes® are the copyrighted property of [employer]  or [facility] [place]   Gastritis   WHAT YOU NEED TO KNOW:   What is gastritis? Gastritis is inflammation or irritation of the lining of your stomach  What increases my risk for gastritis? · Infection with bacteria, a virus, or a parasite    · NSAIDs, aspirin, or steroid medicine    · Use of tobacco products or alcohol    · Trauma such as an injury to your stomach or intestine    · Autoimmune disorders such as diabetes, thyroid disease, or Crohn disease    · Stress    · Age older than 60 years    · Illegal drugs, such as cocaine    What are the signs and symptoms of gastritis? · Stomach pain, burning, or tenderness when you press on it    · Stomach fullness or tightness    · Nausea or vomiting    · Loss of appetite, or feeling full quickly when you eat    · Bad breath    · Fatigue or feeling more tired than usual    · Heartburn    How is gastritis diagnosed? Your healthcare provider will ask about your signs and symptoms and examine you  You may need any of the following:  · Blood tests  may be used to show an infection, dehydration, or anemia (low red blood cell levels)      · A bowel movement sample  may be tested for blood or the germ that may be causing your gastritis  · A breath test  may show if H pylori is causing your gastritis  You will be given a liquid to drink  Then you will breathe into a bag  Your healthcare provider will measure the amount of carbon dioxide in your breath  Extra amounts of carbon dioxide may mean you have an H pylori infection  · An endoscopy  may be used to look for irritation or bleeding in your stomach  Your healthcare provider will use an endoscope (tube with a light and camera on the end) during the procedure  He or she may take a sample from your stomach to be tested  How is gastritis treated? Your symptoms may go away without treatment  Treatment will depend on what is causing your gastritis  Your healthcare provider may recommend changes to the medicines you take  Medicines may be given to help treat a bacterial infection or decrease stomach acid  How can I manage or prevent gastritis? · Do not smoke  Nicotine and other chemicals in cigarettes and cigars can make your symptoms worse and cause lung damage  Ask your healthcare provider for information if you currently smoke and need help to quit  E-cigarettes or smokeless tobacco still contain nicotine  Talk to your healthcare provider before you use these products  · Do not drink alcohol  Alcohol can prevent healing and make your gastritis worse  Talk to your healthcare provider if you need help to stop drinking  · Do not take NSAIDs or aspirin unless directed  These and similar medicines can cause irritation of your stomach lining  If your healthcare provider says it is okay to take NSAIDs, take them with food  · Do not eat foods that cause irritation  Foods such as oranges and salsa can cause burning or pain  Eat a variety of healthy foods  Examples include fruits (not citrus), vegetables, low-fat dairy products, beans, whole-grain breads, and lean meats and fish   Try to eat small meals, and drink water with your meals  Do not eat for at least 3 hours before you go to bed  · Find ways to relax and decrease stress  Stress can increase stomach acid and make gastritis worse  Activities such as yoga, meditation, or listening to music can help you relax  Spend time with friends, or do things you enjoy  Call 911 for any of the following:   · You develop chest pain or shortness of breath  When should I seek immediate care? · You vomit blood  · You have black or bloody bowel movements  · You have severe stomach or back pain  When should I contact my healthcare provider? · You have a fever  · You have new or worsening symptoms, even after treatment  · You have questions or concerns about your condition or care  CARE AGREEMENT:   You have the right to help plan your care  Learn about your health condition and how it may be treated  Discuss treatment options with your healthcare providers to decide what care you want to receive  You always have the right to refuse treatment  The above information is an  only  It is not intended as medical advice for individual conditions or treatments  Talk to your doctor, nurse or pharmacist before following any medical regimen to see if it is safe and effective for you  © Copyright [address] Information is for End User's use only and may not be sold, redistributed or otherwise used for commercial purposes  All illustrations and images included in CareNotes® are the copyrighted property of A D A M , Inc  or [employer]  Upper Endoscopy   WHAT YOU NEED TO KNOW:   An upper endoscopy is also called an upper gastrointestinal (GI) endoscopy, or an esophagogastroduodenoscopy (EGD)  It is a procedure to examine the inside of your esophagus, stomach, and duodenum (first part of the small intestine) with a scope  You may feel bloated, gassy, or have some abdominal discomfort after your procedure   Your throat may be sore for 24 to 36 hours  You may burp or pass gas from air that is still inside your body  DISCHARGE INSTRUCTIONS:   Seek care immediately if:    You have sudden, severe abdominal pain   You have problems swallowing   You have a large amount of black, sticky bowel movements or blood in your bowel movements   You have sudden trouble breathing   You feel weak, lightheaded, or faint or your heart beats faster than normal for you  Contact your healthcare provider if:    You have a fever and chills   You have nausea or are vomiting   Your abdomen is bloated or feels full and hard   You have abdominal pain   You have black, sticky bowel movements or blood in your bowel movements   You have not had a bowel movement for 3 days after your procedure   You have rash or hives   You have questions or concerns about your procedure  Activity:    Do not lift, strain, or run for 24 hours after your procedure   Rest after your procedure  You have been given medicine to relax you  Do not drive or make important decisions until the day after your procedure  Return to your normal activity as directed   Relieve gas and discomfort from bloating by lying on your right side with a heating pad on your abdomen  You may need to take short walks to help the gas move out  Eat small meals until bloating is relieved  Follow up with your healthcare provider as directed: Write down your questions so you remember to ask them during your visits  If you take a blood thinner, please review the specific instructions from your endoscopist about when you should resume it  These can be found in the Recommendation and Your Medication list sections of this After Visit Summary  Gastric Polyps   WHAT YOU NEED TO KNOW:   What are gastric polyps? Gastric polyps are growths that form in the lining of your stomach   They are not cancerous, but certain types of polyps can change into cancer  What puts me at risk for gastric polyps? · Chronic gastritis caused by NSAIDs use or ulcers    · Long-term use of proton pump inhibitor medicines (used to decrease stomach acid)    · An infection in your stomach caused by H  pylori bacteria    What are the symptoms of gastric polyps? You may have no symptoms  Large polyps may cause any of the following:  · Abdominal pain    · Indigestion    · Vomiting after meals or vomiting blood    · Dark or bloody bowel movements    How are gastric polyps diagnosed? Gastric polyps are usually found during an endoscopy for another reason  All or part of the polyp will be removed during the test  Your healthcare provider may also remove tissue from your stomach  The polyps and tissue are sent to the lab for testing  How are gastric polyps treated? Some types of polyps go away on their own  Other types may be removed if they are large, you have symptoms, or abnormal cells are found  Large polyps and abnormal cells increase your risk for cancer  You may also need antibiotics if you have an infection caused by H  pylori bacteria  Part of your stomach may be removed if the polyps cannot be removed and abnormal cells are found  When should I seek immediate care? · You have blood in your vomit  · You have dark or bloody bowel movements  · You have severe pain in your abdomen that does not go away after you take medicine  When should I contact my healthcare provider? · You have indigestion that does not go away with treatment  · You vomit after meals  · You have questions or concerns about your condition or care  CARE AGREEMENT:   You have the right to help plan your care  Learn about your health condition and how it may be treated  Discuss treatment options with your healthcare providers to decide what care you want to receive  You always have the right to refuse treatment  The above information is an  only   It is not intended as medical advice for individual conditions or treatments  Talk to your doctor, nurse or pharmacist before following any medical regimen to see if it is safe and effective for you  © Copyright [address] Information is for End User's use only and may not be sold, redistributed or otherwise used for commercial purposes  All illustrations and images included in CareNotes® are the copyrighted property of A D A M , Inc  or [employer] Mar   Hiatal Hernia   WHAT YOU NEED TO KNOW:   What is a hiatal hernia? A hiatal hernia is a condition that causes part of your stomach to bulge through the hiatus (small opening) in your diaphragm  The part of the stomach may move up and down, or it may get trapped above the diaphragm  What increases my risk for a hiatal hernia? The exact cause of a hiatal hernia is not known  You may have been born with a large hiatus  The following may increase your risk of a hiatal hernia:  · Obesity    · Older age    · Medical conditions such as diverticulosis or esophagitis    · Previous surgery of the esophagus or stomach or trauma such as from a motor vehicle accident    What are the types of hiatal hernia? · Type I (sliding hiatal hernia): A portion of the stomach slides in and out of the hiatus  This type is the most common and usually causes gastroesophageal reflux disease (GERD)  GERD occurs when the esophageal sphincter does not close properly and causes acid reflux  The esophageal sphincter is the lower muscle of the esophagus  · Type II (paraesophageal hiatal hernia):  Type II hiatal hernia forms when a part of the stomach squeezes through the hiatus and lies next to the esophagus  · Type III (combined):  Type III hiatal hernia is a combination of a sliding and a paraesophageal hiatal hernia  · Type IV (complex paraesophageal hiatal hernia):   The whole stomach, the small and large bowels, spleen, pancreas, or liver is pushed up into the chest     What are the signs and symptoms of a hiatal hernia? The most common symptom is heartburn  This usually occurs after meals and spreads to your neck, jaw, or shoulder  You may have no signs or symptoms, or you may have any of the following:  · Abdominal pain, especially in the area just above your navel    · Bitter or acid taste in your mouth    · Trouble swallowing    · Coughing or hoarseness    · Chest pain or shortness of breath that occurs after eating    · Frequent burping or hiccups    · Uncomfortable feeling of fullness after eating    How is a hiatal hernia diagnosed? · An upper GI series test  includes x-rays of your esophagus, stomach, and your small intestines  It is also called a barium swallow test  You will be given barium (a chalky liquid) to drink before the pictures are taken  This liquid helps your stomach and intestines show up better on the x-rays  An upper GI series can show if you have an ulcer, a blocked intestine, or other problems  · An endoscopy  uses a scope to see the inside of your digestive tract  A scope is a long, bendable tube with a light on the end of it  A camera may be hooked to the scope to take pictures  How is a hiatal hernia treated? Treatment depends on the type of hiatal hernia you have and on your symptoms  You may not need any treatment  You may need any of the following:  · Medicines  may be given to relieve heartburn symptoms  These medicines help to decrease or block stomach acid  You may also be given medicines that help to tighten the esophageal sphincter  · Surgery  may be done when medicines cannot control your symptoms, or other problems are present  Your healthcare provider may also suggest surgery depending on the type of hernia you have  Your healthcare provider can put your stomach back into its normal location  He may make the hiatus (hole) smaller and anchor your stomach in your abdomen   Fundoplication is a surgery that wraps the upper part of the stomach around the esophageal sphincter to strengthen it  How can I manage symptoms? The following nutrition and lifestyle changes may be recommended to relieve symptoms of heartburn  · Avoid foods that make your symptoms worse  These may include spicy foods, fruit juices, alcohol, caffeine, chocolate, and mint  · Eat several small meals during the day  Small meals give your stomach less food to digest     · Avoid lying down and bending forward after you eat  Do not eat meals 2 to 3 hours before bedtime  This decreases your risk for reflux  · Maintain a healthy weight  If you are overweight, weight loss may help relieve your symptoms  · Sleep with your head elevated  at least 6 inches  · Do not smoke  Smoking can increase your symptoms of heartburn  When should I seek immediate care? · You have severe abdominal pain  · You try to vomit but nothing comes out (retching)  · You have severe chest pain and sudden trouble breathing  · Your bowel movements are black or bloody  · Your vomit looks like coffee grounds or has blood in it  When should I contact my healthcare provider? · Your symptoms are getting worse  · You have nausea, and you are vomiting  · You are losing weight without trying  · You have questions or concerns about your condition or care  CARE AGREEMENT:   You have the right to help plan your care  Learn about your health condition and how it may be treated  Discuss treatment options with your healthcare providers to decide what care you want to receive  You always have the right to refuse treatment  The above information is an  only  It is not intended as medical advice for individual conditions or treatments  Talk to your doctor, nurse or pharmacist before following any medical regimen to see if it is safe and effective for you    © Copyright [address] Information is for End User's use only and may not be sold, redistributed or otherwise used for commercial purposes   All illustrations and images included in CareNotes® are the copyrighted property of A D A M , Inc  or [facility] [place]

## 2021-04-20 NOTE — H&P
History and Physical -  [facility]  Sukh Newman 77 y o  female MRN: [id number]    HPI: Sukh Newman is a 77y o  year old female who presents for for an upper endoscopy  Patient does have a history of solid food dysphagia  She has had this in the past and has had multiple upper endoscopies and previous esophageal dilations    REVIEW OF SYSTEMS: Per the HPI, and otherwise unremarkable      Historical Information   Past Medical History:   Diagnosis Date    Anemia     As a child    Anxiety     Arthritis 2016    Atrial fibrillation ([place] [address] )     Cardiac disease     afib    [place] ([place] [address] )     Coronary artery disease     Diabetes mellitus ([place] [address] )     Diverticulitis of colon     Fatty liver     GERD (gastroesophageal reflux disease)     Hypertension 2016    Hypertensive urgency 2016    Hypokalemia 2016    Hypomagnesemia 2016    Nephrolithiasis     New onset a-fib ([place] [address] ) 2016    Psychiatric disorder     anxiety    SOB (shortness of breath) 2016    Thyroid nodule      Past Surgical History:   Procedure Laterality Date    AV NODE ABLATION       SECTION      x3    CHOLECYSTECTOMY      COLONOSCOPY  2003    ENDOMETRIAL ABLATION      KIDNEY STONE SURGERY      OOPHORECTOMY Left     pt not sure but thinks 1    UPPER GASTROINTESTINAL ENDOSCOPY       Social History   Social History     Substance and Sexual Activity   Alcohol Use Never    Frequency: Never    Binge frequency: Never     Social History     Substance and Sexual Activity   Drug Use Never     Social History     Tobacco Use   Smoking Status Never Smoker   Smokeless Tobacco Never Used     Family History   Problem Relation Age of Onset    No Known Problems Mother     No Known Problems Father     No Known Problems Sister     Ovarian cancer Daughter 36    ALONZO disease Daughter     No Known Problems Maternal Grandmother     No Known Problems Maternal Grandfather  No Known Problems Paternal Grandmother     No Known Problems Paternal Grandfather     Ovarian cancer Daughter 40    No Known Problems Maternal Aunt     No Known Problems Maternal Aunt     No Known Problems Maternal Aunt     No Known Problems Maternal Aunt     No Known Problems Paternal Aunt     No Known Problems Paternal Aunt     Pancreatic cancer Cousin         29's    ALONZO disease Son     No Known Problems Brother     Colon cancer Neg Hx     Colon polyps Neg Hx        Meds/Allergies       Current Outpatient Medications:     ALPRAZolam (XANAX) 0 25 mg tablet    famotidine (PEPCID) 40 MG tablet    glipiZIDE (GLUCOTROL) 5 mg tablet    losartan (COZAAR) 100 MG tablet    metoprolol tartrate (LOPRESSOR) 25 mg tablet    dabigatran etexilate (PRADAXA) 150 mg capsu    diltiazem (CARDIZEM SR) 120 mg 12 hr capsule    Current Facility-Administered Medications:     sodium chloride 0 9 % infusion, 50 mL/hr, Intravenous, Continuous    Allergies   Allergen Reactions    Dilaudid [Hydromorphone Hcl] Anaphylaxis    Flagyl [Metronidazole] Anaphylaxis and Rash    Hydromorphone Anaphylaxis    Morphine Anaphylaxis, Rash and GI Intolerance    Diovan [Valsartan] GI Intolerance     Difficulty, swallowing, breathing    Hydralazine Other (See Comments)     Cannot remember reation    Amlodipine Rash    Ciprofloxacin GI Intolerance    Clindamycin Other (See Comments)     C diff    Dexilant [Dexlansoprazole] Other (See Comments)     Unknown per pt    Metformin Hydrochloride-Repaglinide [Repaglinide-Metformin Hcl] Other (See Comments)     Unknown per pt      Nexium [Esomeprazole Magnesium] Diarrhea and GI Intolerance    Pantoprazole Diarrhea and GI Intolerance       Objective     Temp 98 6 °F (37 °C) (Temporal)   LMP  (LMP Unknown)     PHYSICAL EXAM    Gen: NAD AAOx3  CV: S1S2 RRR no m/r/g  CHEST: Clear b/l no c/r/w  ABD: soft, +BS NT/ND  EXT: no edema    ASSESSMENT/PLAN:  This is a 77y o  year old female here for EGD and possible esophageal dilation and she is stable and optimized for her procedure

## 2021-04-20 NOTE — ANESTHESIA PREPROCEDURE EVALUATION
Procedure:  EGD    Relevant Problems   ANESTHESIA (within normal limits)      CARDIO   (+) CAD (coronary artery disease)   (+) Hypertension   (+) Hypertensive urgency   (+) New onset a-fib (HCC)      ENDO   (+) Diabetes mellitus, type 2 (HCC)      GI/HEPATIC   (+) Colon cancer (HCC)   (+) Dysphagia   (+) GERD (gastroesophageal reflux disease)      /RENAL (within normal limits)      GYN (within normal limits)      HEMATOLOGY (within normal limits)      MUSCULOSKELETAL   (+) Arthritis      NEURO/PSYCH   (+) Anxiety      PULMONARY   (+) SOB (shortness of breath)   (-) Smoking      Other   (+) Obesity (BMI 30 0-34  9)        Physical Exam    Airway    Mallampati score: II  TM Distance: >3 FB  Neck ROM: full     Dental   No notable dental hx     Cardiovascular  Rhythm: regular, Rate: normal, Cardiovascular exam normal    Pulmonary  Pulmonary exam normal Breath sounds clear to auscultation,     Other Findings        Anesthesia Plan  ASA Score- 3     Anesthesia Type- IV sedation with anesthesia with ASA Monitors  Additional Monitors:   Airway Plan:           Plan Factors-Exercise tolerance (METS): >4 METS  Chart reviewed  EKG reviewed  Patient summary reviewed  Patient is not a current smoker  Obstructive sleep apnea risk education given perioperatively  Induction-     Postoperative Plan-     Informed Consent- Anesthetic plan and risks discussed with patient

## 2021-04-20 NOTE — ANESTHESIA POSTPROCEDURE EVALUATION
Post-Op Assessment Note    CV Status:  Stable  Pain Score: 0    Pain management: adequate     Mental Status:  Sleepy and awake   Hydration Status:  Stable   PONV Controlled:  None   Airway Patency:  Patent and adequate   Two or more mitigation strategies used for obstructive sleep apnea   Post Op Vitals Reviewed: Yes      Staff: Anesthesiologist         No complications documented      BP     Temp      Pulse     Resp     SpO2

## 2021-05-26 ENCOUNTER — OFFICE VISIT (OUTPATIENT)
Dept: GASTROENTEROLOGY | Facility: CLINIC | Age: 66
End: 2021-05-26
Payer: MEDICARE

## 2021-05-26 VITALS
HEART RATE: 69 BPM | BODY MASS INDEX: 33.19 KG/M2 | WEIGHT: 219 LBS | SYSTOLIC BLOOD PRESSURE: 120 MMHG | DIASTOLIC BLOOD PRESSURE: 76 MMHG | HEIGHT: 68 IN

## 2021-05-26 DIAGNOSIS — K59.09 OTHER CONSTIPATION: ICD-10-CM

## 2021-05-26 DIAGNOSIS — Z79.01 CHRONIC ANTICOAGULATION: ICD-10-CM

## 2021-05-26 DIAGNOSIS — R13.10 DYSPHAGIA, UNSPECIFIED TYPE: ICD-10-CM

## 2021-05-26 DIAGNOSIS — Z12.11 COLON CANCER SCREENING: ICD-10-CM

## 2021-05-26 DIAGNOSIS — K21.9 GASTROESOPHAGEAL REFLUX DISEASE, UNSPECIFIED WHETHER ESOPHAGITIS PRESENT: Primary | ICD-10-CM

## 2021-05-26 PROCEDURE — 99214 OFFICE O/P EST MOD 30 MIN: CPT | Performed by: INTERNAL MEDICINE

## 2021-05-26 NOTE — PROGRESS NOTES
[facility] - Outpatient Follow-up Note  Darci Alfredo 77 y o  female MRN: [id number]  Encounter: [id number]    ASSESSMENT AND PLAN:      1  Gastroesophageal reflux disease, unspecified whether esophagitis present  Stable post EGD on Pepcid  2  Colon cancer screening  Overdue she says it has been over 10 years since she has had colonoscopy  3  Chronic anticoagulation  On Pradaxa for AFib will request this be stopped prior to colonoscopy    4  Other constipation  Represents a change in bowel habits we discussed pharmacologic treatment she wants to try fiber and prune juice  Plan to schedule colonoscopy off anticoagulation she is aware of the small risks of stopping anticoagulation    5  Dysphagia, unspecified type  Resolved post dilatation      Followup Appointment:  3 months  ______________________________________________________________________    Chief Complaint   Patient presents with    Follow up to EGD     HPI:  Patient is a very pleasant 13-year-old female seen by us a couple months ago for dysphagia and reflux symptoms underwent EGD with dilatation and is improved  Now complaining of constipation relatively new onset correlates with starting glipizide      Historical Information   Past Medical History:   Diagnosis Date    Anemia     As a child    Anxiety     Arthritis 5/22/2016    [facility] [address]     Cardiac disease     afib    Colon cancer [facility] [address] [facility]     Coronary artery disease     Diabetes mellitus ([facility] [address] [facility]     Diverticulitis of colon     Fatty liver     GERD (gastroesophageal reflux disease)     Hypertension 5/22/2016    Hypertensive urgency 5/22/2016    Hypokalemia 5/22/2016    Hypomagnesemia 5/22/2016    Nephrolithiasis     New onset a-fib ([address] ) 5/22/2016    Psychiatric disorder     anxiety    SOB (shortness of breath) 5/22/2016    Thyroid nodule      Past Surgical History:   Procedure Laterality Date    AV NODE ABLATION       SECTION      x3    CHOLECYSTECTOMY      COLONOSCOPY  2003    ENDOMETRIAL ABLATION      KIDNEY STONE SURGERY      OOPHORECTOMY Left     pt not sure but thinks 1    UPPER GASTROINTESTINAL ENDOSCOPY       Social History     Substance and Sexual Activity   Alcohol Use Never    Frequency: Never    Binge frequency: Never     Social History     Substance and Sexual Activity   Drug Use Never     Social History     Tobacco Use   Smoking Status Never Smoker   Smokeless Tobacco Never Used     Family History   Problem Relation Age of Onset    No Known Problems Mother     No Known Problems Father     No Known Problems Sister     Ovarian cancer Daughter 36    ALONZO disease Daughter     No Known Problems Maternal Grandmother     No Known Problems Maternal Grandfather     No Known Problems Paternal Grandmother     No Known Problems Paternal Grandfather     Ovarian cancer Daughter 40    No Known Problems Maternal Aunt     No Known Problems Maternal Aunt     No Known Problems Maternal Aunt     No Known Problems Maternal Aunt     No Known Problems Paternal Aunt     No Known Problems Paternal Aunt     Pancreatic cancer Cousin         29's    ALONZO disease Son     No Known Problems Brother     Colon cancer Neg Hx     Colon polyps Neg Hx          Current Outpatient Medications:     ALPRAZolam (XANAX) 0 25 mg tablet    dabigatran etexilate (PRADAXA) 150 mg capsu    famotidine (PEPCID) 40 MG tablet    glipiZIDE (GLUCOTROL) 5 mg tablet    losartan (COZAAR) 100 MG tablet    metoprolol tartrate (LOPRESSOR) 25 mg tablet    diltiazem (CARDIZEM SR) 120 mg 12 hr capsule  Allergies   Allergen Reactions    Dilaudid [Hydromorphone Hcl] Anaphylaxis    Flagyl [Metronidazole] Anaphylaxis and Rash    Hydromorphone Anaphylaxis    Morphine Anaphylaxis, Rash and GI Intolerance    Diovan [Valsartan] GI Intolerance     Difficulty, swallowing, breathing    Hydralazine Other (See Comments)     Cannot remember reation    Amlodipine Rash    Ciprofloxacin GI Intolerance    Clindamycin Other (See Comments)     C diff    Dexilant [Dexlansoprazole] Other (See Comments)     Unknown per pt    Metformin Hydrochloride-Repaglinide [Repaglinide-Metformin Hcl] Other (See Comments)     Unknown per pt      Nexium [Esomeprazole Magnesium] Diarrhea and GI Intolerance    Pantoprazole Diarrhea and GI Intolerance     Reviewed medications and allergies and updated as indicated    PHYSICAL EXAM:    Blood pressure 120/76, pulse 69, height 5' 8" (1 727 m), weight 99 3 kg (219 lb)  Body mass index is 33 3 kg/m²  General Appearance: NAD, cooperative, alert  Eyes: Anicteric, PERRLA, EOMI  ENT:  Normocephalic, atraumatic, normal mucosa  Neck:  Supple, symmetrical, trachea midline  Resp:  Clear to auscultation bilaterally; no rales, rhonchi or wheezing; respirations unlabored   CV:  S1 S2, Regular rate and rhythm; no murmur, rub, or gallop  GI:  Soft, non-tender, non-distended; normal bowel sounds; no masses, no organomegaly   Rectal: Deferred  Musculoskeletal: No cyanosis, clubbing or edema  Normal ROM  Skin:  No jaundice, rashes, or lesions   Heme/Lymph: No palpable cervical lymphadenopathy  Psych: Normal affect, good eye contact  Neuro: No gross deficits, AAOx3    Lab Results:   Lab Results   Component Value Date    WBC 7 15 06/29/2018    HGB 15 4 06/29/2018    HCT 46 2 (H) 06/29/2018    MCV 90 06/29/2018     06/29/2018     Lab Results   Component Value Date     12/11/2015    K 3 7 06/29/2018     06/29/2018    CO2 28 06/29/2018    ANIONGAP 9 12/11/2015    BUN 14 06/29/2018    CREATININE 1 08 06/29/2018    GLUCOSE 120 12/11/2015    CALCIUM 8 9 06/29/2018    AST 31 06/29/2018    ALT 42 06/29/2018    ALKPHOS 155 (H) 06/29/2018    EGFR 54 06/29/2018     No results found for: IRON, TIBC, FERRITIN  Lab Results   Component Value Date    LIPASE 458 (H) 06/26/2016       Radiology Results:   No results found

## 2021-06-03 ENCOUNTER — TELEPHONE (OUTPATIENT)
Dept: GASTROENTEROLOGY | Facility: CLINIC | Age: 66
End: 2021-06-03

## 2021-06-03 NOTE — TELEPHONE ENCOUNTER
1st call-spoke w/pt-her diabetes is out of control right now-just started new meds  Will call to sched when this is under control  tk has paperwork-pt on  pradaxa

## 2021-07-07 ENCOUNTER — HOSPITAL ENCOUNTER (OUTPATIENT)
Dept: MAMMOGRAPHY | Facility: IMAGING CENTER | Age: 66
Discharge: HOME | End: 2021-07-07
Payer: COMMERCIAL

## 2021-07-07 ENCOUNTER — HOSPITAL ENCOUNTER (OUTPATIENT)
Dept: BONE DENSITY | Facility: IMAGING CENTER | Age: 66
Discharge: HOME | End: 2021-07-07
Payer: COMMERCIAL

## 2021-07-07 VITALS — WEIGHT: 220 LBS | HEIGHT: 68 IN | BODY MASS INDEX: 33.34 KG/M2

## 2021-07-07 DIAGNOSIS — Z12.31 ENCOUNTER FOR SCREENING MAMMOGRAM FOR MALIGNANT NEOPLASM OF BREAST: ICD-10-CM

## 2021-07-07 DIAGNOSIS — Z13.820 ENCOUNTER FOR SCREENING FOR OSTEOPOROSIS: ICD-10-CM

## 2021-07-07 PROCEDURE — 77067 SCR MAMMO BI INCL CAD: CPT

## 2021-07-07 PROCEDURE — 77080 DXA BONE DENSITY AXIAL: CPT

## 2021-07-07 PROCEDURE — 77063 BREAST TOMOSYNTHESIS BI: CPT

## 2021-08-02 ENCOUNTER — OFFICE VISIT (OUTPATIENT)
Dept: ENDOCRINOLOGY | Facility: HOSPITAL | Age: 66
End: 2021-08-02
Payer: COMMERCIAL

## 2021-08-02 VITALS
HEART RATE: 61 BPM | BODY MASS INDEX: 32.46 KG/M2 | DIASTOLIC BLOOD PRESSURE: 74 MMHG | SYSTOLIC BLOOD PRESSURE: 144 MMHG | HEIGHT: 68 IN | WEIGHT: 214.2 LBS

## 2021-08-02 DIAGNOSIS — E11.9 TYPE 2 DIABETES MELLITUS WITHOUT COMPLICATION, WITHOUT LONG-TERM CURRENT USE OF INSULIN (HCC): Primary | ICD-10-CM

## 2021-08-02 DIAGNOSIS — I10 ESSENTIAL HYPERTENSION: Chronic | ICD-10-CM

## 2021-08-02 PROCEDURE — 99204 OFFICE O/P NEW MOD 45 MIN: CPT | Performed by: INTERNAL MEDICINE

## 2021-08-02 NOTE — PROGRESS NOTES
8/2/2021    Assessment/Plan      Diagnoses and all orders for this visit:    Type 2 diabetes mellitus without complication, without long-term current use of insulin (HCC)  -     sitaGLIPtin (JANUVIA) 100 mg tablet; 1 tab daily  -     Continous glucose monitoring dexcom placement; Future  -     Continous glucose monitoring dexcom intrepretation; Future  -     Ambulatory referral to Diabetic Education; Future  -     Hemoglobin A1C  -     Comprehensive metabolic panel  -     Lipid Panel with Direct LDL reflex  -     Microalbumin / creatinine urine ratio  -     TSH, 3rd generation    Essential hypertension  -     Hemoglobin A1C  -     Comprehensive metabolic panel  -     Lipid Panel with Direct LDL reflex  -     Microalbumin / creatinine urine ratio  -     TSH, 3rd generation        Assessment/Plan:    1  Type 2 diabetes:  She is due for lab work which I have ordered and we will call her with the results  On her blood sugar log she is having too frequent hypoglycemia  I suggested since she is on a lowest dose of glipizide that we adjust her medication regimen  I suggested we consider trial of Januvia 100 mg daily  We reviewed how this medication works as well as side effects and contraindications  Another option would be to consider Actos or Prandin if needed  She will meet with diabetes education participate in a dex com professional trial       2  Hypertension:  Continue current regimen for now  CC: Diabetes follow-up    History of Present Illness     HPI: Uriel Yen is a 77y o  year old female with type 2 diabetes for several years  She is on oral agents at home and takes glipizide 2 5 mg daily  She denies any polyuria, polydipsia, nocturia and blurry vision  She denies neuropathy, nephropathy and retinopathy  Had GI discomfort on metformin  Hypoglycemic episodes: Yes  Yes occurring typically every week  Reviewed proper treatment of hypoglycemia and rule of 15       The patient's due for an eye exam and I encouraged her to update this  Blood Sugar/Glucometer/Pump/CGM review:  Blood sugars checked 2 or 3 times daily overall look reasonably well but she is having hypoglycemia occurring to often which can occur in the morning fasting as well as later in the day  for hypertension, she is treated with losartan 100 mg daily as well as metoprolol  Review of Systems   Constitutional: Negative for fatigue  HENT: Negative for trouble swallowing and voice change  Eyes: Negative for visual disturbance  Respiratory: Negative for shortness of breath  Cardiovascular: Negative for palpitations and leg swelling  Gastrointestinal: Negative for abdominal pain, nausea and vomiting  Endocrine: Negative for polydipsia and polyuria  Musculoskeletal: Negative for arthralgias and myalgias  Skin: Negative for rash  Neurological: Negative for dizziness, tremors and weakness  Hematological: Negative for adenopathy  Psychiatric/Behavioral: Negative for agitation and confusion         Historical Information   Past Medical History:   Diagnosis Date    Anemia     As a child    Anxiety     Arthritis 2016    [facility] [address]     Cardiac disease     afib    Coronary artery disease     Diabetes mellitus ([facility] [address]     Diverticulitis of colon     Fatty liver     GERD (gastroesophageal reflux disease)     Hypertension 2016    Hypertensive urgency 2016    Hypokalemia 2016    Hypomagnesemia 2016    Nephrolithiasis     New onset a-fib ([address] ) 2016    Psychiatric disorder     anxiety    SOB (shortness of breath) 2016    Thyroid nodule      Past Surgical History:   Procedure Laterality Date    AV NODE ABLATION       SECTION      x3    CHOLECYSTECTOMY      COLONOSCOPY  2003    ENDOMETRIAL ABLATION      KIDNEY STONE SURGERY      OOPHORECTOMY Left     pt not sure but thinks [address] ENDOSCOPY       Social History Social History     Substance and Sexual Activity   Alcohol Use Never     Social History     Substance and Sexual Activity   Drug Use Never     Social History     Tobacco Use   Smoking Status Never Smoker   Smokeless Tobacco Never Used     Family History:   Family History   Problem Relation Age of Onset    Ovarian cancer Mother 39    No Known Problems Father     Ovarian cancer Sister 58    Ovarian cancer Daughter 36    ALONZO disease Daughter     No Known Problems Maternal Grandmother     No Known Problems Maternal Grandfather     No Known Problems Paternal Grandmother     No Known Problems Paternal Grandfather     Ovarian cancer Daughter 40    No Known Problems Maternal Aunt     No Known Problems Maternal Aunt     No Known Problems Maternal Aunt     No Known Problems Maternal Aunt     No Known Problems Paternal Aunt     No Known Problems Paternal Aunt     Pancreatic cancer Cousin         29's    ALONZO disease Son     No Known Problems Brother     Colon cancer Neg Hx     Colon polyps Neg Hx        Meds/Allergies   Current Outpatient Medications   Medication Sig Dispense Refill    ALPRAZolam (XANAX) 0 25 mg tablet 1 tablet 2 (two) times a day as needed   dabigatran etexilate (PRADAXA) 150 mg capsu Take 150 mg by mouth 2 (two) times a day      famotidine (PEPCID) 40 MG tablet Take 40 mg by mouth 2 (two) times a day      glipiZIDE (GLUCOTROL) 5 mg tablet Take 2 5 mg by mouth daily       losartan (COZAAR) 100 MG tablet Take 1 tablet (100 mg total) by mouth daily 90 tablet 3    metoprolol tartrate (LOPRESSOR) 25 mg tablet 100 mg 2 (two) times a day        sitaGLIPtin (JANUVIA) 100 mg tablet 1 tab daily 30 tablet 5     No current facility-administered medications for this visit       Allergies   Allergen Reactions    Dilaudid [Hydromorphone Hcl] Anaphylaxis    Flagyl [Metronidazole] Anaphylaxis and Rash    Hydromorphone Anaphylaxis    Morphine Anaphylaxis, Rash and GI Intolerance    Diovan [Valsartan] GI Intolerance     Difficulty, swallowing, breathing    Hydralazine Other (See Comments)     Cannot remember reation    Amlodipine Rash    Ciprofloxacin GI Intolerance    Clindamycin Other (See Comments)     C diff    Dexilant [Dexlansoprazole] Other (See Comments)     Unknown per pt    Metformin Hydrochloride-Repaglinide [Repaglinide-Metformin Hcl] Other (See Comments)     Unknown per pt      Nexium [Esomeprazole Magnesium] Diarrhea and GI Intolerance    Pantoprazole Diarrhea and GI Intolerance       Objective   Vitals: Blood pressure 144/74, pulse 61, height 5' 8" (1 727 m), weight 97 2 kg (214 lb 3 2 oz)  Invasive Devices     None                 Physical Exam  Vitals reviewed  Constitutional:       General: She is not in acute distress  Appearance: She is well-developed  She is not diaphoretic  HENT:      Head: Normocephalic and atraumatic  Eyes:      Conjunctiva/sclera: Conjunctivae normal       Pupils: Pupils are equal, round, and reactive to light  Neck:      Thyroid: No thyromegaly  Cardiovascular:      Rate and Rhythm: Normal rate and regular rhythm  Pulmonary:      Effort: Pulmonary effort is normal  No respiratory distress  Breath sounds: Normal breath sounds  Abdominal:      General: Bowel sounds are normal       Palpations: Abdomen is soft  Musculoskeletal:         General: Normal range of motion  Cervical back: Normal range of motion and neck supple  Skin:     General: Skin is warm and dry  Findings: No rash  Neurological:      Mental Status: She is alert and oriented to person, place, and time  Motor: No abnormal muscle tone  Psychiatric:         Behavior: Behavior normal          The history was obtained from the review of the chart and from the patient      Lab Results:    Most recent Alc is  Lab Results   Component Value Date    HGBA1C 8 9 04/07/2021    HGBA1C 8 9 04/07/2021           No components found for: HA1C  No components found for: GLU    Lab Results   Component Value Date    CREATININE 1 08 06/29/2018    CREATININE 1 04 08/21/2017    CREATININE 0 83 12/17/2016    BUN 14 06/29/2018     12/11/2015    K 3 7 06/29/2018     06/29/2018    CO2 28 06/29/2018     eGFR   Date Value Ref Range Status   06/29/2018 54 ml/min/1 73sq m Final     No components found for: [facility]    Lab Results   Component Value Date    HDL 51 05/23/2016    TRIG 107 05/23/2016       Lab Results   Component Value Date    ALT 42 06/29/2018    AST 31 06/29/2018    ALKPHOS 155 (H) 06/29/2018       No results found for: TSH, FREET4, TSI        Labs from 04/08/2021:   Hemoglobin 14 9, urine microalbumin to creatinine ratio 20, A1c 8 9, creatinine 0 9, GFR 66    No future appointments  Portions of the record may have been created with voice recognition software  Occasional wrong word or "sound a like" substitutions may have occurred due to the inherent limitations of voice recognition software  Read the chart carefully and recognize, using context, where substitutions have occurred

## 2021-08-12 LAB
ALBUMIN SERPL-MCNC: 4.3 G/DL (ref 3.8–4.8)
ALBUMIN/CREAT UR: 6 MG/G CREAT (ref 0–29)
ALBUMIN/GLOB SERPL: 1.7 {RATIO} (ref 1.2–2.2)
ALP SERPL-CCNC: 96 IU/L (ref 48–121)
ALT SERPL-CCNC: 16 IU/L (ref 0–32)
AST SERPL-CCNC: 15 IU/L (ref 0–40)
BILIRUB SERPL-MCNC: 0.3 MG/DL (ref 0–1.2)
BUN SERPL-MCNC: 18 MG/DL (ref 8–27)
BUN/CREAT SERPL: 21 (ref 12–28)
CALCIUM SERPL-MCNC: 9.4 MG/DL (ref 8.7–10.3)
CHLORIDE SERPL-SCNC: 103 MMOL/L (ref 96–106)
CHOLEST SERPL-MCNC: 168 MG/DL (ref 100–199)
CO2 SERPL-SCNC: 25 MMOL/L (ref 20–29)
CREAT SERPL-MCNC: 0.87 MG/DL (ref 0.57–1)
CREAT UR-MCNC: 148.8 MG/DL
EST. AVERAGE GLUCOSE BLD GHB EST-MCNC: 137 MG/DL
GLOBULIN SER-MCNC: 2.5 G/DL (ref 1.5–4.5)
GLUCOSE SERPL-MCNC: 121 MG/DL (ref 65–99)
HBA1C MFR BLD: 6.4 % (ref 4.8–5.6)
HDLC SERPL-MCNC: 50 MG/DL
LDLC SERPL CALC-MCNC: 100 MG/DL (ref 0–99)
LDLC/HDLC SERPL: 2 RATIO (ref 0–3.2)
MICROALBUMIN UR-MCNC: 8.9 UG/ML
POTASSIUM SERPL-SCNC: 4.7 MMOL/L (ref 3.5–5.2)
PROT SERPL-MCNC: 6.8 G/DL (ref 6–8.5)
SL AMB EGFR AFRICAN AMERICAN: 79 ML/MIN/1.73
SL AMB EGFR NON AFRICAN AMERICAN: 69 ML/MIN/1.73
SL AMB VLDL CHOLESTEROL CALC: 18 MG/DL (ref 5–40)
SODIUM SERPL-SCNC: 140 MMOL/L (ref 134–144)
TRIGL SERPL-MCNC: 98 MG/DL (ref 0–149)
TSH SERPL DL<=0.005 MIU/L-ACNC: 2.8 UIU/ML (ref 0.45–4.5)

## 2021-08-19 ENCOUNTER — OFFICE VISIT (OUTPATIENT)
Dept: DIABETES SERVICES | Facility: HOSPITAL | Age: 66
End: 2021-08-19
Payer: COMMERCIAL

## 2021-08-19 VITALS — HEIGHT: 67 IN | BODY MASS INDEX: 33.59 KG/M2 | WEIGHT: 214 LBS

## 2021-08-19 DIAGNOSIS — E11.9 TYPE 2 DIABETES MELLITUS WITHOUT COMPLICATION, WITHOUT LONG-TERM CURRENT USE OF INSULIN (HCC): Primary | ICD-10-CM

## 2021-08-19 PROCEDURE — 95250 CONT GLUC MNTR PHYS/QHP EQP: CPT | Performed by: DIETITIAN, REGISTERED

## 2021-08-19 NOTE — PROGRESS NOTES
Dexcom G6 Professional Training    Met with Jillian Nielson for [facility] G6 Professional training  Training today included:    - Explained procedure to Alejandra Farah  - Checked sensor expiration date; Transmitter Id: [id number]   - Patient has a blood glucose meter and strips  - Transmitter has been cleaned with alcohol and dried  - Discussed site selection; identified insertion site: right abdomen  - Cleansed the skin with alcohol  - Inserted sensor per instructions: insert sensor,remove insertion tool, smooth tape on the skin  - Snapped in grey transmitter  - Verified transmitter fully snapped in on both sides (2clicks)  - Disposed insertion tool in sharps container  - Verified communication with office reader    Pt is using the dexcom unblinded and will running it through their smartphone  Connected in office, patient left in warmup mode  Dr Ulices [place] is concerned about low blood sugars and is doing the dexcom trial to assess hypoglycemia  She has been having a lot less lows since coming to [facility] and the decrease of her glipizide in the past few weeks  Lab Results   Component Value Date    HGBA1C 6 4 (H) 08/11/2021       Patient instructions reviewed with patient:    - Sensor is waterproof for showering and swimming, remove for hot tub, MRI, Xray  - Remove if redness, bleeding, swelling, discomfort at site  - Removal is in 10days with return instructions, you may leave it on your body or remove it just like a bandaid  Place in ziplock back and bring back to the office  - Return for follow up with me in 2 weeks for download review  Sensor inserted by: Donny Soares RD ISRAELE    Alejandra Farah will return in 2 weeks for sensor removal and data download        Donny Soares, [address] [place] [address] 20  [address] [facility] [id number]

## 2021-08-31 NOTE — TELEPHONE ENCOUNTER
Dr Nayan Barajas was contacted to sched colon discussed at last ov-pt would not schedule due to her diabetes being totally out of control  Pt has still not scheduled-please advise   Thank you

## 2021-09-02 ENCOUNTER — OFFICE VISIT (OUTPATIENT)
Dept: DIABETES SERVICES | Facility: HOSPITAL | Age: 66
End: 2021-09-02
Payer: COMMERCIAL

## 2021-09-02 ENCOUNTER — TELEPHONE (OUTPATIENT)
Dept: ENDOCRINOLOGY | Facility: HOSPITAL | Age: 66
End: 2021-09-02

## 2021-09-02 VITALS — HEIGHT: 67 IN | BODY MASS INDEX: 33.59 KG/M2 | WEIGHT: 214 LBS

## 2021-09-02 DIAGNOSIS — E11.9 TYPE 2 DIABETES MELLITUS WITHOUT COMPLICATION, WITHOUT LONG-TERM CURRENT USE OF INSULIN (HCC): Primary | ICD-10-CM

## 2021-09-02 PROCEDURE — 95251 CONT GLUC MNTR ANALYSIS I&R: CPT | Performed by: DIETITIAN, REGISTERED

## 2021-09-02 NOTE — PROGRESS NOTES
Dexcom Professional G6 Removal     Observations: Met with Danial Rivers for Dexcom pro removal  Download shows in goal range 89%, High 11%, 0% low blood sugars  Doctor Rom Patel was concerned about low blood sugars as she was having too many of them on fingerstick results  Since the change of her diabetes medication to downgrade in the glipizide her blood sugars have been better and less hypoglycemia she states  Once but were blood sugars rise to much is after her morning coffee which has flavored Monreal Railing, discussed that this is typical and she will experiment with buying different creamers  No further questions  Download given to Dr Rom Patel for review but we discussed that I do not believe he will make any changes to her medication, she will get a phone call from the office if he decides to change something, otherwise no news means continue the course with current diabetes medication  She will call with questions or for more Education as needed      Download   - Downloaded via [facility] to provider for review    Lab Results   Component Value Date    HGBA1C 6 4 (H) 08/11/2021       Lab Results   Component Value Date     12/11/2015    SODIUM 140 08/11/2021    K 4 7 08/11/2021     08/11/2021    CO2 25 08/11/2021    ANIONGAP 9 12/11/2015    AGAP 9 06/29/2018    BUN 18 08/11/2021    CREATININE 0 87 08/11/2021    GLUC 121 (H) 08/11/2021    CALCIUM 8 9 06/29/2018    AST 15 08/11/2021    ALT 16 08/11/2021    ALKPHOS 155 (H) 06/29/2018    TP 6 8 08/11/2021    TBILI 0 3 08/11/2021    EGFR 54 06/29/2018           Sensor Removal By: patient  Download Performed by: Michelle Silverman RD CDE

## 2021-11-05 ENCOUNTER — OFFICE VISIT (OUTPATIENT)
Dept: ENDOCRINOLOGY | Facility: HOSPITAL | Age: 66
End: 2021-11-05
Payer: COMMERCIAL

## 2021-11-05 VITALS
HEIGHT: 67 IN | DIASTOLIC BLOOD PRESSURE: 70 MMHG | SYSTOLIC BLOOD PRESSURE: 118 MMHG | HEART RATE: 75 BPM | BODY MASS INDEX: 33.09 KG/M2 | WEIGHT: 210.8 LBS

## 2021-11-05 DIAGNOSIS — M62.81 MUSCLE WEAKNESS: ICD-10-CM

## 2021-11-05 DIAGNOSIS — E11.9 TYPE 2 DIABETES MELLITUS WITHOUT COMPLICATION, WITHOUT LONG-TERM CURRENT USE OF INSULIN (HCC): Primary | ICD-10-CM

## 2021-11-05 PROCEDURE — 99214 OFFICE O/P EST MOD 30 MIN: CPT | Performed by: INTERNAL MEDICINE

## 2021-11-05 RX ORDER — DEXAMETHASONE 1 MG
TABLET ORAL
Qty: 1 TABLET | Refills: 0 | Status: SHIPPED | OUTPATIENT
Start: 2021-11-05 | End: 2022-02-07

## 2022-02-02 LAB
25(OH)D3+25(OH)D2 SERPL-MCNC: 22.6 NG/ML (ref 30–100)
CORTIS AM PEAK SERPL-MCNC: 0.8 UG/DL (ref 6.2–19.4)
CRP SERPL-MCNC: 6 MG/L (ref 0–10)
ERYTHROCYTE [DISTWIDTH] IN BLOOD BY AUTOMATED COUNT: 11.8 % (ref 11.7–15.4)
ERYTHROCYTE [SEDIMENTATION RATE] IN BLOOD BY WESTERGREN METHOD: 61 MM/HR (ref 0–40)
HCT VFR BLD AUTO: 40.8 % (ref 34–46.6)
HGB BLD-MCNC: 13.6 G/DL (ref 11.1–15.9)
MCH RBC QN AUTO: 31 PG (ref 26.6–33)
MCHC RBC AUTO-ENTMCNC: 33.3 G/DL (ref 31.5–35.7)
MCV RBC AUTO: 93 FL (ref 79–97)
PLATELET # BLD AUTO: 234 X10E3/UL (ref 150–450)
RBC # BLD AUTO: 4.39 X10E6/UL (ref 3.77–5.28)
T4 FREE SERPL-MCNC: 1.15 NG/DL (ref 0.82–1.77)
TSH SERPL DL<=0.005 MIU/L-ACNC: 2.19 UIU/ML (ref 0.45–4.5)
WBC # BLD AUTO: 5.6 X10E3/UL (ref 3.4–10.8)

## 2022-02-07 ENCOUNTER — OFFICE VISIT (OUTPATIENT)
Dept: ENDOCRINOLOGY | Facility: HOSPITAL | Age: 67
End: 2022-02-07
Payer: COMMERCIAL

## 2022-02-07 VITALS
BODY MASS INDEX: 33.37 KG/M2 | DIASTOLIC BLOOD PRESSURE: 70 MMHG | HEART RATE: 62 BPM | WEIGHT: 212.6 LBS | HEIGHT: 67 IN | SYSTOLIC BLOOD PRESSURE: 110 MMHG

## 2022-02-07 DIAGNOSIS — E55.9 VITAMIN D INSUFFICIENCY: Primary | ICD-10-CM

## 2022-02-07 DIAGNOSIS — E11.9 TYPE 2 DIABETES MELLITUS WITHOUT COMPLICATION, WITHOUT LONG-TERM CURRENT USE OF INSULIN (HCC): ICD-10-CM

## 2022-02-07 DIAGNOSIS — I10 ESSENTIAL HYPERTENSION: ICD-10-CM

## 2022-02-07 PROCEDURE — 99214 OFFICE O/P EST MOD 30 MIN: CPT | Performed by: INTERNAL MEDICINE

## 2022-02-07 RX ORDER — MELATONIN: Start: 2022-02-07 | End: 2022-06-15

## 2022-02-07 NOTE — PROGRESS NOTES
2/7/2022    Assessment/Plan      Diagnoses and all orders for this visit:    Vitamin D insufficiency  -     cholecalciferol (VITAMIN D3) 1,000 units tablet; 1 tab daily  -     Hemoglobin A1C; Future  -     Comprehensive metabolic panel; Future  -     Lipid Panel with Direct LDL reflex; Future  -     CBC and differential; Future  -     Microalbumin / creatinine urine ratio; Future  -     TSH, 3rd generation; Future  -     Hemoglobin A1C  -     Comprehensive metabolic panel  -     Lipid Panel with Direct LDL reflex  -     CBC and differential  -     Microalbumin / creatinine urine ratio  -     TSH, 3rd generation  -     Vitamin D 25 hydroxy Lab Collect; Future  -     Vitamin D 25 hydroxy Lab Collect    Type 2 diabetes mellitus without complication, without long-term current use of insulin (HCC)  -     Hemoglobin A1C; Future  -     Comprehensive metabolic panel; Future  -     Lipid Panel with Direct LDL reflex; Future  -     CBC and differential; Future  -     Microalbumin / creatinine urine ratio; Future  -     TSH, 3rd generation; Future  -     Hemoglobin A1C  -     Comprehensive metabolic panel  -     Lipid Panel with Direct LDL reflex  -     CBC and differential  -     Microalbumin / creatinine urine ratio  -     TSH, 3rd generation    Essential hypertension  -     Hemoglobin A1C; Future  -     Comprehensive metabolic panel; Future  -     Lipid Panel with Direct LDL reflex; Future  -     CBC and differential; Future  -     Microalbumin / creatinine urine ratio; Future  -     TSH, 3rd generation; Future  -     Hemoglobin A1C  -     Comprehensive metabolic panel  -     Lipid Panel with Direct LDL reflex  -     CBC and differential  -     Microalbumin / creatinine urine ratio  -     TSH, 3rd generation        Assessment/Plan:  1  Type 2 diabetes:  Overall based on blood sugar data appears to be doing well  Will follow up on lab work done through PCP for A1c and other supporting labs        2  Hypertension: Continue current regimen  3  Elevated sed rate:  Recommend she discuss with her primary care provider or consider rheumatology referral       4  Vitamin-D insufficiency: Start 1000 units daily and will repeat labs prior to next appointment  CC: Diabetes follow-up    History of Present Illness     HPI: Mame Diop is a 74y o  year old female with type 2 diabetes for several years  She is on oral agents at home and takes glipizide 2 5 mg daily  She denies any polyuria, polydipsia, nocturia and blurry vision  She denies neuropathy, nephropathy and retinopathy  She had GI discomfort on metformin  Hypoglycemic episodes: No      The patient's last eye exam was in Oct 2021  Blood Sugar/Glucometer/Pump/CGM review:  Blood sugars checked about 2 or 3 times daily have been much improved  There are checked about 2 or 3 times daily and there is 1 significant hyperglycemic value around when she had a kidney stone  No hypoglycemia  She also has history of hypertension treated with losartan 100 mg daily and metoprolol  Review of Systems   Constitutional: Negative for fatigue  HENT: Negative for trouble swallowing and voice change  Eyes: Negative for visual disturbance  Respiratory: Negative for shortness of breath  Cardiovascular: Negative for palpitations and leg swelling  Gastrointestinal: Negative for abdominal pain, nausea and vomiting  Endocrine: Negative for polydipsia and polyuria  Musculoskeletal: Negative for arthralgias and myalgias  Skin: Negative for rash  Neurological: Negative for dizziness, tremors and weakness  Hematological: Negative for adenopathy  Psychiatric/Behavioral: Negative for agitation and confusion         Historical Information   Past Medical History:   Diagnosis Date    Anemia     As a child    Anxiety     Arthritis 5/22/2016    Atrial fibrillation ([facility] [address] [facility]     Cardiac disease     afib    Coronary artery disease     Diabetes mellitus ([facility] [address] [facility]  Diverticulitis of colon     Fatty liver     GERD (gastroesophageal reflux disease)     Hypertension 2016    Hypertensive urgency 2016    Hypokalemia 2016    Hypomagnesemia 2016    Nephrolithiasis     New onset a-fib ([address] ) 2016    Psychiatric disorder     anxiety    SOB (shortness of breath) 2016    Thyroid nodule      Past Surgical History:   Procedure Laterality Date    AV NODE ABLATION       SECTION      x3    CHOLECYSTECTOMY      COLONOSCOPY  2003    ENDOMETRIAL ABLATION      KIDNEY STONE SURGERY      OOPHORECTOMY Left     pt not sure but thinks 1    UPPER GASTROINTESTINAL ENDOSCOPY       Social History   Social History     Substance and Sexual Activity   Alcohol Use Never     Social History     Substance and Sexual Activity   Drug Use Never     Social History     Tobacco Use   Smoking Status Never Smoker   Smokeless Tobacco Never Used     Family History:   Family History   Problem Relation Age of Onset    Ovarian cancer Mother 39    No Known Problems Father     Ovarian cancer Sister 58    Ovarian cancer Daughter 36    ALONZO disease Daughter     No Known Problems Maternal Grandmother     No Known Problems Maternal Grandfather     No Known Problems Paternal Grandmother     No Known Problems Paternal Grandfather     Ovarian cancer Daughter 40    No Known Problems Maternal Aunt     No Known Problems Maternal Aunt     No Known Problems Maternal Aunt     No Known Problems Maternal Aunt     No Known Problems Paternal Aunt     No Known Problems Paternal Aunt     Pancreatic cancer Cousin         29's    ALONZO disease Son     No Known Problems Brother     Colon cancer Neg Hx     Colon polyps Neg Hx        Meds/Allergies   Current Outpatient Medications   Medication Sig Dispense Refill    ALPRAZolam (XANAX) 0 25 mg tablet 1 tablet 2 (two) times a day as needed          dabigatran etexilate (PRADAXA) 150 mg capsu Take 150 mg by mouth 2 (two) times a day      famotidine (PEPCID) 40 MG tablet Take 40 mg by mouth 2 (two) times a day      glipiZIDE (GLUCOTROL) 5 mg tablet Take 2 5 mg by mouth daily Now being ordered as a 2 5 instead of a 5mg breaking in half   losartan (COZAAR) 100 MG tablet Take 1 tablet (100 mg total) by mouth daily 90 tablet 3    metoprolol tartrate (LOPRESSOR) 25 mg tablet 100 mg 2 (two) times a day        cholecalciferol (VITAMIN D3) 1,000 units tablet 1 tab daily       No current facility-administered medications for this visit  Allergies   Allergen Reactions    Dilaudid [Hydromorphone Hcl] Anaphylaxis    Flagyl [Metronidazole] Anaphylaxis and Rash    Hydromorphone Anaphylaxis    Morphine Anaphylaxis, Rash and GI Intolerance    Diovan [Valsartan] GI Intolerance     Difficulty, swallowing, breathing    Hydralazine Other (See Comments)     Cannot remember reation    Simvastatin Other (See Comments)    Sulfamethoxazole-Trimethoprim Other (See Comments)    Amlodipine Rash    Ciprofloxacin GI Intolerance    Clindamycin Other (See Comments)     C diff    Dexilant [Dexlansoprazole] Other (See Comments)     Unknown per pt    Metformin Hydrochloride-Repaglinide [Repaglinide-Metformin Hcl] Other (See Comments)     Unknown per pt      Nexium [Esomeprazole Magnesium] Diarrhea and GI Intolerance    Pantoprazole Diarrhea and GI Intolerance       Objective   Vitals: Blood pressure 110/70, pulse 62, height 5' 7" (1 702 m), weight 96 4 kg (212 lb 9 6 oz)  Invasive Devices  Report    None                 Physical Exam  Vitals reviewed  Constitutional:       General: She is not in acute distress  Appearance: She is well-developed  She is not diaphoretic  HENT:      Head: Normocephalic and atraumatic  Eyes:      Conjunctiva/sclera: Conjunctivae normal       Pupils: Pupils are equal, round, and reactive to light  Neck:      Thyroid: No thyromegaly     Cardiovascular:      Rate and Rhythm: Normal rate and regular rhythm  Pulmonary:      Effort: Pulmonary effort is normal  No respiratory distress  Breath sounds: Normal breath sounds  Abdominal:      General: Bowel sounds are normal       Palpations: Abdomen is soft  Musculoskeletal:         General: Normal range of motion  Cervical back: Normal range of motion and neck supple  Skin:     General: Skin is warm and dry  Findings: No rash  Neurological:      Mental Status: She is alert and oriented to person, place, and time  Motor: No abnormal muscle tone  Psychiatric:         Behavior: Behavior normal          The history was obtained from the review of the chart and from the patient  Lab Results:    Most recent Alc is  Lab Results   Component Value Date    HGBA1C 6 4 (H) 08/11/2021           No components found for: HA1C  No components found for: GLU    Lab Results   Component Value Date    CREATININE 0 87 08/11/2021    CREATININE 1 08 06/29/2018    CREATININE 1 04 08/21/2017    BUN 18 08/11/2021     12/11/2015    K 4 7 08/11/2021     08/11/2021    CO2 25 08/11/2021     eGFR   Date Value Ref Range Status   06/29/2018 54 ml/min/1 73sq m Final     No components found for: [facility]    Lab Results   Component Value Date    HDL 50 08/11/2021    TRIG 98 08/11/2021       Lab Results   Component Value Date    ALT 16 08/11/2021    AST 15 08/11/2021    ALKPHOS 155 (H) 06/29/2018       Lab Results   Component Value Date    TSH 2 190 02/01/2022    FREET4 1 15 02/01/2022           Future Appointments   Date Time Provider Jensen Grajedai   6/15/2022  8:10 AM Daisy Said, DO [facility]       Portions of the record may have been created with voice recognition software  Occasional wrong word or "sound a like" substitutions may have occurred due to the inherent limitations of voice recognition software  Read the chart carefully and recognize, using context, where substitutions have occurred

## 2022-02-08 LAB
CK SERPL-CCNC: 39 U/L (ref 32–182)
DEXAMETHASONE SERPL-MCNC: 448 NG/DL

## 2022-06-10 LAB
25(OH)D3+25(OH)D2 SERPL-MCNC: 36 NG/ML (ref 30–100)
ALBUMIN SERPL-MCNC: 4.2 G/DL (ref 3.8–4.8)
ALBUMIN/CREAT UR: 8 MG/G CREAT (ref 0–29)
ALBUMIN/GLOB SERPL: 1.8 {RATIO} (ref 1.2–2.2)
ALP SERPL-CCNC: 86 IU/L (ref 44–121)
ALT SERPL-CCNC: 15 IU/L (ref 0–32)
AST SERPL-CCNC: 18 IU/L (ref 0–40)
BASOPHILS # BLD AUTO: 0 X10E3/UL (ref 0–0.2)
BASOPHILS NFR BLD AUTO: 1 %
BILIRUB SERPL-MCNC: 0.4 MG/DL (ref 0–1.2)
BUN SERPL-MCNC: 19 MG/DL (ref 8–27)
BUN/CREAT SERPL: 21 (ref 12–28)
CALCIUM SERPL-MCNC: 9.1 MG/DL (ref 8.7–10.3)
CHLORIDE SERPL-SCNC: 102 MMOL/L (ref 96–106)
CHOLEST SERPL-MCNC: 168 MG/DL (ref 100–199)
CO2 SERPL-SCNC: 22 MMOL/L (ref 20–29)
CREAT SERPL-MCNC: 0.89 MG/DL (ref 0.57–1)
CREAT UR-MCNC: 130.1 MG/DL
EGFR: 71 ML/MIN/1.73
EOSINOPHIL # BLD AUTO: 0.1 X10E3/UL (ref 0–0.4)
EOSINOPHIL NFR BLD AUTO: 2 %
ERYTHROCYTE [DISTWIDTH] IN BLOOD BY AUTOMATED COUNT: 11.4 % (ref 11.7–15.4)
EST. AVERAGE GLUCOSE BLD GHB EST-MCNC: 131 MG/DL
GLOBULIN SER-MCNC: 2.4 G/DL (ref 1.5–4.5)
GLUCOSE SERPL-MCNC: 132 MG/DL (ref 65–99)
HBA1C MFR BLD: 6.2 % (ref 4.8–5.6)
HCT VFR BLD AUTO: 40.8 % (ref 34–46.6)
HDLC SERPL-MCNC: 55 MG/DL
HGB BLD-MCNC: 13.6 G/DL (ref 11.1–15.9)
IMM GRANULOCYTES # BLD: 0 X10E3/UL (ref 0–0.1)
IMM GRANULOCYTES NFR BLD: 0 %
LDLC SERPL CALC-MCNC: 94 MG/DL (ref 0–99)
LDLC/HDLC SERPL: 1.7 RATIO (ref 0–3.2)
LYMPHOCYTES # BLD AUTO: 1.3 X10E3/UL (ref 0.7–3.1)
LYMPHOCYTES NFR BLD AUTO: 26 %
MCH RBC QN AUTO: 30.8 PG (ref 26.6–33)
MCHC RBC AUTO-ENTMCNC: 33.3 G/DL (ref 31.5–35.7)
MCV RBC AUTO: 93 FL (ref 79–97)
MICROALBUMIN UR-MCNC: 10.1 UG/ML
MONOCYTES # BLD AUTO: 0.5 X10E3/UL (ref 0.1–0.9)
MONOCYTES NFR BLD AUTO: 11 %
NEUTROPHILS # BLD AUTO: 3.1 X10E3/UL (ref 1.4–7)
NEUTROPHILS NFR BLD AUTO: 60 %
PLATELET # BLD AUTO: 203 X10E3/UL (ref 150–450)
POTASSIUM SERPL-SCNC: 4.1 MMOL/L (ref 3.5–5.2)
PROT SERPL-MCNC: 6.6 G/DL (ref 6–8.5)
RBC # BLD AUTO: 4.41 X10E6/UL (ref 3.77–5.28)
SL AMB VLDL CHOLESTEROL CALC: 19 MG/DL (ref 5–40)
SODIUM SERPL-SCNC: 140 MMOL/L (ref 134–144)
TRIGL SERPL-MCNC: 105 MG/DL (ref 0–149)
TSH SERPL DL<=0.005 MIU/L-ACNC: 3.06 UIU/ML (ref 0.45–4.5)
WBC # BLD AUTO: 5.1 X10E3/UL (ref 3.4–10.8)

## 2022-06-15 ENCOUNTER — OFFICE VISIT (OUTPATIENT)
Dept: ENDOCRINOLOGY | Facility: HOSPITAL | Age: 67
End: 2022-06-15
Payer: COMMERCIAL

## 2022-06-15 VITALS
SYSTOLIC BLOOD PRESSURE: 136 MMHG | BODY MASS INDEX: 34.15 KG/M2 | WEIGHT: 217.6 LBS | OXYGEN SATURATION: 99 % | HEART RATE: 56 BPM | HEIGHT: 67 IN | DIASTOLIC BLOOD PRESSURE: 78 MMHG

## 2022-06-15 DIAGNOSIS — E55.9 VITAMIN D INSUFFICIENCY: Primary | ICD-10-CM

## 2022-06-15 DIAGNOSIS — I10 ESSENTIAL HYPERTENSION: ICD-10-CM

## 2022-06-15 DIAGNOSIS — E11.9 TYPE 2 DIABETES MELLITUS WITHOUT COMPLICATION, WITHOUT LONG-TERM CURRENT USE OF INSULIN (HCC): ICD-10-CM

## 2022-06-15 PROCEDURE — 99214 OFFICE O/P EST MOD 30 MIN: CPT | Performed by: INTERNAL MEDICINE

## 2022-06-15 NOTE — PROGRESS NOTES
6/15/2022    Assessment/Plan      Diagnoses and all orders for this visit:    Vitamin D insufficiency  -     Hemoglobin A1C; Future  -     Comprehensive metabolic panel; Future  -     Lipid Panel with Direct LDL reflex; Future  -     CBC and differential; Future  -     Microalbumin / creatinine urine ratio; Future  -     TSH, 3rd generation; Future  -     Hemoglobin A1C  -     Comprehensive metabolic panel  -     Lipid Panel with Direct LDL reflex  -     CBC and differential  -     Microalbumin / creatinine urine ratio  -     TSH, 3rd generation    Type 2 diabetes mellitus without complication, without long-term current use of insulin (HCC)  -     Hemoglobin A1C; Future  -     Comprehensive metabolic panel; Future  -     Lipid Panel with Direct LDL reflex; Future  -     CBC and differential; Future  -     Microalbumin / creatinine urine ratio; Future  -     TSH, 3rd generation; Future  -     Hemoglobin A1C  -     Comprehensive metabolic panel  -     Lipid Panel with Direct LDL reflex  -     CBC and differential  -     Microalbumin / creatinine urine ratio  -     TSH, 3rd generation    Essential hypertension  -     Hemoglobin A1C; Future  -     Comprehensive metabolic panel; Future  -     Lipid Panel with Direct LDL reflex; Future  -     CBC and differential; Future  -     Microalbumin / creatinine urine ratio; Future  -     TSH, 3rd generation; Future  -     Hemoglobin A1C  -     Comprehensive metabolic panel  -     Lipid Panel with Direct LDL reflex  -     CBC and differential  -     Microalbumin / creatinine urine ratio  -     TSH, 3rd generation        Assessment/Plan:  1  Type 2 diabetes:  Overall well controlled on current regimen  Follow up in 6 months with labs prior  She will call sooner with any questions or concerns  2  Hypertension: Continue losartan        CC: Diabetes follow-up    History of Present Illness     HPI: Lora Thomas is a 74y o  year old female with type 2 diabetes for several years  She is on oral agents at home and takes glipizide 2 5 mg daily  She denies any polyuria, polydipsia, nocturia and blurry vision  She denies neuropathy, nephropathy and retinopathy  In the past she had GI discomfort on metformin  Hypoglycemic episodes:  No blood sugars below 70  The patient's last eye exam was in October 21  Blood Sugar/Glucometer/Pump/CGM review:  No log for review today  Reportedly have been running in the 1 100s with occasional value above 200 every few weeks  She also has history of hypertension treated with losartan 100 mg daily and metoprolol  Review of Systems   Constitutional: Negative for fatigue  HENT: Negative for trouble swallowing and voice change  Eyes: Negative for visual disturbance  Respiratory: Negative for shortness of breath  Cardiovascular: Negative for palpitations and leg swelling  Gastrointestinal: Negative for abdominal pain, nausea and vomiting  Endocrine: Negative for polydipsia and polyuria  Musculoskeletal: Negative for arthralgias and myalgias  Skin: Negative for rash  Neurological: Negative for dizziness, tremors and weakness  Hematological: Negative for adenopathy  Psychiatric/Behavioral: Negative for agitation and confusion         Historical Information   Past Medical History:   Diagnosis Date    Anemia     As a child    Anxiety     Arthritis 5/22/2016    Atrial fibrillation ([facility] [address]     Cardiac disease     afib    Coronary artery disease     Diabetes mellitus ([facility] [address] [facility]     Diverticulitis of colon     Fatty liver     GERD (gastroesophageal reflux disease)     Hypertension 5/22/2016    Hypertensive urgency 5/22/2016    Hypokalemia 5/22/2016    Hypomagnesemia 5/22/2016    Nephrolithiasis     New onset a-fib ([facility] [address] ) 5/22/2016    Psychiatric disorder     anxiety    SOB (shortness of breath) 5/22/2016    Thyroid nodule      Past Surgical History:   Procedure Laterality Date    AV NODE ABLATION       SECTION      x3    CHOLECYSTECTOMY      COLONOSCOPY  2003    ENDOMETRIAL ABLATION      KIDNEY STONE SURGERY      OOPHORECTOMY Left     pt not sure but thinks 1    UPPER GASTROINTESTINAL ENDOSCOPY       Social History   Social History     Substance and Sexual Activity   Alcohol Use Never     Social History     Substance and Sexual Activity   Drug Use Never     Social History     Tobacco Use   Smoking Status Never Smoker   Smokeless Tobacco Never Used     Family History:   Family History   Problem Relation Age of Onset    Ovarian cancer Mother 39    No Known Problems Father     Ovarian cancer Sister 58    Ovarian cancer Daughter 36    ALONZO disease Daughter     No Known Problems Maternal Grandmother     No Known Problems Maternal Grandfather     No Known Problems Paternal Grandmother     No Known Problems Paternal Grandfather     Ovarian cancer Daughter 40    No Known Problems Maternal Aunt     No Known Problems Maternal Aunt     No Known Problems Maternal Aunt     No Known Problems Maternal Aunt     No Known Problems Paternal Aunt     No Known Problems Paternal Aunt     Pancreatic cancer Cousin         29's    ALONOZ disease Son     No Known Problems Brother     Colon cancer Neg Hx     Colon polyps Neg Hx        Meds/Allergies   Current Outpatient Medications   Medication Sig Dispense Refill    ALPRAZolam (XANAX) 0 25 mg tablet 1 tablet daily      dabigatran etexilate (PRADAXA) 150 mg capsu Take 150 mg by mouth 2 (two) times a day      famotidine (PEPCID) 40 MG tablet Take 40 mg by mouth 2 (two) times a day      glipiZIDE (GLUCOTROL) 5 mg tablet Take 2 5 mg by mouth daily Now being ordered as a 2 5 instead of a 5mg breaking in half   losartan (COZAAR) 100 MG tablet Take 1 tablet (100 mg total) by mouth daily 90 tablet 3    metoprolol tartrate (LOPRESSOR) 50 mg tablet 100 mg 2 (two) times a day         No current facility-administered medications for this visit  Allergies   Allergen Reactions    Dilaudid [Hydromorphone Hcl] Anaphylaxis    Flagyl [Metronidazole] Anaphylaxis and Rash    Hydromorphone Anaphylaxis    Morphine Anaphylaxis, Rash and GI Intolerance    Diovan [Valsartan] GI Intolerance     Difficulty, swallowing, breathing    Hydralazine Other (See Comments)     Cannot remember reation    Simvastatin Other (See Comments)    Sulfamethoxazole-Trimethoprim Other (See Comments)    Amlodipine Rash    Ciprofloxacin GI Intolerance    Clindamycin Other (See Comments)     C diff    Dexilant [Dexlansoprazole] Other (See Comments)     Unknown per pt    Metformin Hydrochloride-Repaglinide [Repaglinide-Metformin Hcl] Other (See Comments)     Unknown per pt      Nexium [Esomeprazole Magnesium] Diarrhea and GI Intolerance    Pantoprazole Diarrhea and GI Intolerance       Objective   Vitals: Blood pressure 136/78, pulse 56, height 5' 7" (1 702 m), weight 98 7 kg (217 lb 9 6 oz), SpO2 99 %  Invasive Devices  Report    None                 Physical Exam  Vitals reviewed  Constitutional:       General: She is not in acute distress  Appearance: She is well-developed  She is not diaphoretic  HENT:      Head: Normocephalic and atraumatic  Eyes:      Conjunctiva/sclera: Conjunctivae normal       Pupils: Pupils are equal, round, and reactive to light  Neck:      Thyroid: No thyromegaly  Cardiovascular:      Rate and Rhythm: Normal rate and regular rhythm  Pulses: no weak pulses          Dorsalis pedis pulses are 2+ on the right side and 2+ on the left side  Posterior tibial pulses are 2+ on the right side and 2+ on the left side  Pulmonary:      Effort: Pulmonary effort is normal  No respiratory distress  Breath sounds: Normal breath sounds  Abdominal:      General: Bowel sounds are normal       Palpations: Abdomen is soft  Musculoskeletal:         General: Normal range of motion        Cervical back: Normal range of motion and neck supple  Feet:      Right foot:      Skin integrity: No ulcer, skin breakdown, erythema, warmth, callus or dry skin  Left foot:      Skin integrity: No ulcer, skin breakdown, erythema, warmth, callus or dry skin  Skin:     General: Skin is warm and dry  Findings: No rash  Neurological:      Mental Status: She is alert and oriented to person, place, and time  Motor: No abnormal muscle tone  Psychiatric:         Behavior: Behavior normal      Patient's shoes and socks removed  Right Foot/Ankle   Right Foot Inspection  Skin Exam: skin normal and skin intact  No dry skin, no warmth, no callus, no erythema, no maceration, no abnormal color, no pre-ulcer, no ulcer and no callus  Sensory   Vibration: intact  Monofilament testing: diminished    Vascular  The right DP pulse is 2+  The right PT pulse is 2+  Left Foot/Ankle  Left Foot Inspection  Skin Exam: skin normal and skin intact  No dry skin, no warmth, no erythema, no maceration, normal color, no pre-ulcer, no ulcer and no callus  Sensory   Vibration: intact  Monofilament testing: diminished    Vascular  The left DP pulse is 2+  The left PT pulse is 2+  Assign Risk Category  No deformity present  Loss of protective sensation  No weak pulses  Risk: 1        The history was obtained from the review of the chart and from the patient      Lab Results:    Most recent Alc is  Lab Results   Component Value Date    HGBA1C 6 2 (H) 06/09/2022           No components found for: HA1C  No components found for: GLU    Lab Results   Component Value Date    CREATININE 0 89 06/09/2022    CREATININE 0 87 08/11/2021    CREATININE 1 08 06/29/2018    BUN 19 06/09/2022     12/11/2015    K 4 1 06/09/2022     06/09/2022    CO2 22 06/09/2022     eGFR   Date Value Ref Range Status   06/09/2022 71 >59 mL/min/1 73 Final   06/29/2018 54 ml/min/1 73sq m Final     No components found for: [facility]    Lab Results   Component Value Date    HDL 55 06/09/2022    TRIG 105 06/09/2022       Lab Results   Component Value Date    ALT 15 06/09/2022    AST 18 06/09/2022    ALKPHOS 155 (H) 06/29/2018       Lab Results   Component Value Date    TSH 3 060 06/09/2022    FREET4 1 15 02/01/2022             Future Appointments   Date Time Provider Jensen Jones   7/8/2022  8:00 AM [facility] 1 [facility]       Portions of the record may have been created with voice recognition software  Occasional wrong word or "sound a like" substitutions may have occurred due to the inherent limitations of voice recognition software  Read the chart carefully and recognize, using context, where substitutions have occurred

## 2022-07-08 ENCOUNTER — HOSPITAL ENCOUNTER (OUTPATIENT)
Dept: MAMMOGRAPHY | Facility: IMAGING CENTER | Age: 67
Discharge: HOME | End: 2022-07-08
Payer: COMMERCIAL

## 2022-07-08 VITALS — WEIGHT: 216.05 LBS | HEIGHT: 67 IN | BODY MASS INDEX: 33.91 KG/M2

## 2022-07-08 DIAGNOSIS — Z12.31 ENCOUNTER FOR SCREENING MAMMOGRAM FOR MALIGNANT NEOPLASM OF BREAST: ICD-10-CM

## 2022-07-08 DIAGNOSIS — Z12.31 VISIT FOR SCREENING MAMMOGRAM: ICD-10-CM

## 2022-07-08 PROCEDURE — 77063 BREAST TOMOSYNTHESIS BI: CPT

## 2022-07-08 PROCEDURE — 77067 SCR MAMMO BI INCL CAD: CPT

## 2022-10-12 PROBLEM — Z12.11 COLON CANCER SCREENING: Status: RESOLVED | Noted: 2021-03-26 | Resolved: 2022-10-12

## 2022-10-24 ENCOUNTER — EVALUATION (OUTPATIENT)
Dept: PHYSICAL THERAPY | Facility: CLINIC | Age: 67
End: 2022-10-24
Payer: COMMERCIAL

## 2022-10-24 DIAGNOSIS — M25.511 ACUTE PAIN OF RIGHT SHOULDER: ICD-10-CM

## 2022-10-24 DIAGNOSIS — M54.2 NECK PAIN: Primary | ICD-10-CM

## 2022-10-24 PROCEDURE — 97162 PT EVAL MOD COMPLEX 30 MIN: CPT | Performed by: PHYSICAL THERAPIST

## 2022-10-24 PROCEDURE — 97110 THERAPEUTIC EXERCISES: CPT | Performed by: PHYSICAL THERAPIST

## 2022-10-24 NOTE — PROGRESS NOTES
PT Evaluation     Today's date: 10/24/2022  Patient name: Tianna Aguilar  : 1955  MRN: [id number]  Referring provider: Christie Blackburn DO  Dx:   Encounter Diagnosis     ICD-10-CM    1  Neck pain  M54 2    2  Acute pain of right shoulder  M25 511                   Assessment  Assessment details: Tianna Aguilar is a 69 y o  female who presents with increased Neck and shoulder pain consistent with referring diagnosis of Neck pain  (primary encounter diagnosis)  Acute pain of right shoulder that is moderately complex secondary to insidious onset, moderate fear avoidance and moderate pain levels  Clinically demonstrates decreased C/S and shoulder ROM, decreased periscapular strength, limited deep neck flexor endurance, poor postural proprioception leading to pain with ADLs and exercise  This suggests the need for skilled OPPT to address the above listed impairments, achieve established goals and return to PLOF pain-free  If you have any questions or concerns please contact me at [phone]  Thank you!   Impairments: abnormal coordination, abnormal muscle firing, abnormal muscle tone, abnormal or restricted ROM, activity intolerance, impaired physical strength, lacks appropriate home exercise program, pain with function, scapular dyskinesis, weight-bearing intolerance and poor body mechanics    Symptom irritability: moderateUnderstanding of Dx/Px/POC: good   Prognosis: good    Goals  Short Term Goals (4 weeks)  1 ) Establish independence with HEP  2 ) Decrease subjective pain levels from NPRS at least to 2-5/10 at rest and with activity  3 ) Improve C/S ROM at least 5-10 degrees into all planes to allow for improved ease of movement with less guarding    Long Term Goals (8 weeks)  1 ) Improve C/S ROM to WNL in all planes to restore normal movement with ADLs and function  2 ) Improve B/L shoulder ER and flexion strength to 5/5 in all planes in order to return to pain-free ADLs and function  3 ) Improve FOTO score at least to 75 points showing improved self reported disability     Plan  Plan details: Initiate POC for C/S ROM, strength and shoulder stability; monitor sxs and progress as able  Patient would benefit from: PT eval and skilled physical therapy  Planned modality interventions: biofeedback, cryotherapy, electrical stimulation/Russian stimulation and TENS  Planned therapy interventions: abdominal trunk stabilization, ADL retraining, activity modification, ADL training, balance, balance/weight bearing training, IADL retraining, joint mobilization, manual therapy, massage, muscle pump exercises, neuromuscular re-education, patient education, postural training, self care, sensory integrative techniques, strengthening, stretching, therapeutic activities, therapeutic exercise, therapeutic training, transfer training, home exercise program, graded motor, graded exercise, graded activity, functional ROM exercises, flexibility and coordination  Frequency: 2x week  Duration in visits: 16  Duration in weeks: 8  Plan of Care beginning date: 10/24/2022  Plan of Care expiration date: 12/26/2022  Treatment plan discussed with: patient        Subjective Evaluation    History of Present Illness  Date of onset: 9/2/2022  Mechanism of injury: Sugey Whitaker is a 69 y o  female who presents with increased C/S and R>L shoulder pain starting ~ 1 5 months ago with ASAEL  Notes getting headaches then pain in the back of her neck and ears with HA's  Had an ENT appointment and (-) for any pathology  Decided to seek out MD consult where muscle relaxer (did not take due to allergic reactions) and a muscle relaxer cream   Notes also getting X-rays were (-) for fx and script for OPPT provided  Notes getting increased night pain or changes in bowel or bladder function  Reports getting B/L NT signs or sxs in hands  F/U with MD in a few weeks with Dr Venice Flores  Wishes to return to PLOF pain-free    Had prior OPPT for B/L frozen shoulders              Not a recurrent problem   Pain  Current pain ratin  At best pain ratin  At worst pain rating: 10  Location: C/S and shoulder  Quality: dull ache, sharp and tight  Relieving factors: medications  Aggravating factors: lifting  Progression: worsening    Social Support  Steps to enter house: yes  13  Stairs in house: yes   13  Lives in: multiple-level home  Lives with: spouse    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)  Hand dominance: ambidextrous  Exercise history: Treadmill pilates machine; and walk everyday   Life stress: Dog stress,  with cancer       Diagnostic Tests  X-ray: abnormal  Treatments  Previous treatment: physical therapy  Current treatment: physical therapy  Patient Goals  Patient goals for therapy: decreased edema, decreased pain, improved balance, increased motion, increased strength, independence with ADLs/IADLs and return to sport/leisure activities  Patient goal: Get rid of pain         Objective     Neurological Testing     Sensation   Cervical/Thoracic   Left   Intact: light touch    Right   Intact: light touch    Active Range of Motion   Cervical/Thoracic Spine       Cervical    Flexion: 53 degrees   Extension: 23 degrees      Left lateral flexion: 20 degrees      Right lateral flexion: 30 degrees      Left rotation: 60 degrees  Right rotation: 43 degrees         Left Shoulder   Flexion: 120 degrees   Extension: 62 degrees   Abduction: 77 degrees   External rotation 0°: 62 degrees   Internal rotation BTB: sacrum     Right Shoulder   Flexion: 105 degrees   Extension: 45 degrees   Abduction: 83 degrees   External rotation 0°: 32 degrees   Internal rotation BTB: sacrum     Passive Range of Motion   Left Shoulder   Flexion: 155 degrees   Abduction: 135 degrees   External rotation 45°: 50 degrees     Right Shoulder   Flexion: 145 degrees   Abduction: 105 degrees   External rotation 45°: 40 degrees     Strength/Myotome Testing     Left Shoulder     Planes of Motion   Flexion: 5   Abduction: 5   External rotation at 0°: 5   Internal rotation at 0°: 5     Right Shoulder     Planes of Motion   Flexion: 4+   Abduction: 4+   External rotation at 0°: 5   Internal rotation at 0°: 5     Tests   Cervical   Negative Sharp-Miguel A test      Left   Negative Spurling's Test A  Right   Negative Spurling's Test A  Precautions: DM2; HTN; CAD; A-Fib; Anxiety;   EPOC: 12/26/22  HEP: Access Code: [email or web address]  URL: [email or web address]  Date: 10/24/2022  Prepared by: Riccardo Rouse    Exercises  · Standing Shoulder Extension with Dowel - 1 x daily - 7 x weekly - 3 sets - 10 reps  · Standing Bilateral Shoulder Internal Rotation AAROM with Dowel - 1 x daily - 7 x weekly - 3 sets - 10 reps  · Supine Shoulder Press with Dowel - 1 x daily - 7 x weekly - 3 sets - 10 reps  · Supine Shoulder Flexion Extension AAROM with Dowel - 1 x daily - 7 x weekly - 3 sets - 10 reps  · Standing Scaption Slide with Wall - 1 x daily - 7 x weekly - 3 sets - 10 reps          Manuals 10/26            C/S PROM             R shoulder PROM                                       Neuro Re-Ed             DNF             Chin tuck                                                                              Ther Ex             No money             H ABD             SA wall slide 10x            TBar flex and CP 10x3"            TBar ext and FIR 10x3"                                                   Ther Activity                                       Gait Training                                       Modalities

## 2022-10-31 ENCOUNTER — OFFICE VISIT (OUTPATIENT)
Dept: PHYSICAL THERAPY | Facility: CLINIC | Age: 67
End: 2022-10-31

## 2022-10-31 DIAGNOSIS — M25.511 ACUTE PAIN OF RIGHT SHOULDER: ICD-10-CM

## 2022-10-31 DIAGNOSIS — M54.2 NECK PAIN: Primary | ICD-10-CM

## 2022-10-31 NOTE — PROGRESS NOTES
Daily Note     Today's date: 10/31/2022  Patient name: Robbin Dean  : 1955  MRN: [id number]  Referring provider: Mayi Duval DO  Dx:   Encounter Diagnosis     ICD-10-CM    1  Neck pain  M54 2    2  Acute pain of right shoulder  M25 511                   Subjective: Reports compliance with her HEP  Feels better since she has been moving more overall  Objective: See treatment diary below      Assessment: Good tolerance to LPD and rowing  Reduced anterior discomfort with performance of SA wall slide and push up as well  Will continue to benefit from skilled OPPT  Plan: Continue per plan of care  Precautions: DM2; HTN; CAD; A-Fib; Anxiety;   EPOC: 22  HEP: Access Code: [email or web address]  URL: [email or web address]  Date: 10/24/2022  Prepared by: Afsaneh Diego    Exercises  · Standing Shoulder Extension with Dowel - 1 x daily - 7 x weekly - 3 sets - 10 reps  · Standing Bilateral Shoulder Internal Rotation AAROM with Dowel - 1 x daily - 7 x weekly - 3 sets - 10 reps  · Supine Shoulder Press with Dowel - 1 x daily - 7 x weekly - 3 sets - 10 reps  · Supine Shoulder Flexion Extension AAROM with Dowel - 1 x daily - 7 x weekly - 3 sets - 10 reps  · Standing Scaption Slide with Wall - 1 x daily - 7 x weekly - 3 sets - 10 reps          Manuals 10/26 10/31           C/S PROM  PF           R shoulder PROM  PF                                     Neuro Re-Ed             DNF             Chin tuck                                                                              Ther Ex             No money  GTB 2x10           H ABD  RTB 20x           SA wall slide 10x 2x10           TBar flex and CP 10x3" 10x3"           TBar ext and FIR 10x3" 10x3"           LPD   45# 2x10           TBand row/ext  BTB 30x ea           Table push up  2x10           Ther Activity                                       Gait Training                                       Modalities

## 2022-11-02 ENCOUNTER — OFFICE VISIT (OUTPATIENT)
Dept: OBGYN CLINIC | Facility: CLINIC | Age: 67
End: 2022-11-02

## 2022-11-02 VITALS
HEIGHT: 67 IN | BODY MASS INDEX: 34.84 KG/M2 | SYSTOLIC BLOOD PRESSURE: 150 MMHG | HEART RATE: 69 BPM | DIASTOLIC BLOOD PRESSURE: 78 MMHG | WEIGHT: 222 LBS

## 2022-11-02 DIAGNOSIS — M75.01 ADHESIVE CAPSULITIS OF RIGHT SHOULDER: ICD-10-CM

## 2022-11-02 DIAGNOSIS — M75.41 IMPINGEMENT SYNDROME OF RIGHT SHOULDER: Primary | ICD-10-CM

## 2022-11-02 DIAGNOSIS — M54.2 NECK PAIN: ICD-10-CM

## 2022-11-02 RX ORDER — TRIAMCINOLONE ACETONIDE 40 MG/ML
40 INJECTION, SUSPENSION INTRA-ARTICULAR; INTRAMUSCULAR
Status: COMPLETED | OUTPATIENT
Start: 2022-11-02 | End: 2022-11-02

## 2022-11-02 RX ORDER — AZELASTINE HYDROCHLORIDE 137 UG/1
1 SPRAY, METERED NASAL 2 TIMES DAILY
COMMUNITY
Start: 2022-10-17

## 2022-11-02 RX ORDER — TRIAMCINOLONE ACETONIDE 1 MG/G
CREAM TOPICAL
COMMUNITY
Start: 2022-08-22

## 2022-11-02 RX ORDER — BUPIVACAINE HYDROCHLORIDE 2.5 MG/ML
4 INJECTION, SOLUTION INFILTRATION; PERINEURAL
Status: COMPLETED | OUTPATIENT
Start: 2022-11-02 | End: 2022-11-02

## 2022-11-02 RX ORDER — COVID-19 ANTIGEN TEST
KIT MISCELLANEOUS
COMMUNITY
Start: 2022-09-10

## 2022-11-02 RX ORDER — GLIPIZIDE 2.5 MG/1
TABLET, EXTENDED RELEASE ORAL
COMMUNITY
Start: 2022-10-25

## 2022-11-02 RX ORDER — CEFUROXIME AXETIL 250 MG/1
TABLET ORAL
COMMUNITY
Start: 2022-08-22

## 2022-11-02 RX ORDER — DABIGATRAN ETEXILATE 150 MG/1
CAPSULE ORAL
COMMUNITY

## 2022-11-02 RX ORDER — METHOCARBAMOL 750 MG/1
750 TABLET, FILM COATED ORAL EVERY 8 HOURS PRN
COMMUNITY
Start: 2022-10-17

## 2022-11-02 RX ADMIN — TRIAMCINOLONE ACETONIDE 40 MG: 40 INJECTION, SUSPENSION INTRA-ARTICULAR; INTRAMUSCULAR at 13:29

## 2022-11-02 RX ADMIN — BUPIVACAINE HYDROCHLORIDE 4 ML: 2.5 INJECTION, SOLUTION INFILTRATION; PERINEURAL at 13:29

## 2022-11-02 NOTE — PATIENT INSTRUCTIONS
Trial subacromial corticosteroid injection right shoulder today to provide relief for shoulder impingement with these capsulitis  Return for ultrasound-guided injection intra-articular to the right shoulder joint for adhesive capsulitis in the future  Recommended trial of physical therapy for shoulder as well as neck  We will avoid oral corticosteroids due to history of diabetes

## 2022-11-02 NOTE — PROGRESS NOTES
1  Impingement syndrome of right shoulder  Large joint arthrocentesis: R subacromial bursa   2  Adhesive capsulitis of right shoulder     3  Neck pain       Orders Placed This Encounter   Procedures   • Large joint arthrocentesis: R subacromial bursa        IMAGING STUDIES: (I personally reviewed images in PACS and report):    x-ray right shoulder 01/09/2020:  No acute osseous abnormality  Mild AC oa      PAST REPORTS:  Mammogram 7/8/22:  No mammographic evidence of malignancy  No significant change       ASSESSMENT/PLAN:  Neck spasm right upper trapezius spasm  Cervical radiculopathy bilateral  Right shoulder impingement with recurrent mild adhesive capsulitis  Past medical history:  Diabetes, GFR 71, creatinine 0 89      Repeat X-ray next visit: None    Return for Follow-up for Ultrasound Guided Injection  Patient Instructions   Trial subacromial corticosteroid injection right shoulder today to provide relief for shoulder impingement with these capsulitis  Return for ultrasound-guided injection intra-articular to the right shoulder joint for adhesive capsulitis in the future  Recommended trial of physical therapy for shoulder as well as neck  We will avoid oral corticosteroids due to history of diabetes        __________________________________________________________________________    HISTORY OF PRESENT ILLNESS:  Evaluation of atraumatic neck and right shoulder pain ongoing for approximately 2 months  Patient unfortunately has to lift her dog who is sick and provide care which she believes is the culprit for her pain  She describes posterior neck pain constant sore throbbing which radiates into the right upper trapezius upon pointing as well as right shoulder  She does have past medical history significant for adhesive capsulitis in the past and had injections approximately 7-8 years ago    Associated symptoms do include radiation of numbness and tingling into the hands while sleeping at bedtime bilaterally  She has done 2 physical therapy visits            Review of Systems      Following history reviewed and update:    Past Medical History:   Diagnosis Date   • Anemia     As a child   • Anxiety    • Arthritis 2016   • Atrial fibrillation ([facility] )    • Cardiac disease     afib   • Coronary artery disease    • Diabetes mellitus ([facility] )    • Diverticulitis of colon    • Fatty liver    • GERD (gastroesophageal reflux disease)    • Hypertension 2016   • Hypertensive urgency 2016   • Hypokalemia 2016   • Hypomagnesemia 2016   • Nephrolithiasis    • New onset a-fib ([facility] ) 2016   • Psychiatric disorder     anxiety   • SOB (shortness of breath) 2016   • Thyroid nodule      Past Surgical History:   Procedure Laterality Date   • AV NODE ABLATION     •  SECTION      x3   • CHOLECYSTECTOMY     • COLONOSCOPY     • ENDOMETRIAL ABLATION     • KIDNEY STONE SURGERY     • OOPHORECTOMY Left     pt not sure but thinks    • UPPER GASTROINTESTINAL ENDOSCOPY       Social History   Social History     Substance and Sexual Activity   Alcohol Use Never     Social History     Substance and Sexual Activity   Drug Use Never     Social History     Tobacco Use   Smoking Status Never Smoker   Smokeless Tobacco Never Used     Family History   Problem Relation Age of Onset   • Ovarian cancer Mother 39   • No Known Problems Father    • Ovarian cancer Sister 58   • Ovarian cancer Daughter 36   • ALONZO disease Daughter    • No Known Problems Maternal Grandmother    • No Known Problems Maternal Grandfather    • No Known Problems Paternal Grandmother    • No Known Problems Paternal Grandfather    • Ovarian cancer Daughter 40   • No Known Problems Maternal Aunt    • No Known Problems Maternal Aunt    • No Known Problems Maternal Aunt    • No Known Problems Maternal Aunt    • No Known Problems Paternal Aunt    • No Known Problems Paternal Aunt    • Pancreatic cancer Cousin         29's   • ALONZO disease Son • No Known Problems Brother    • Colon cancer Neg Hx    • Colon polyps Neg Hx      Allergies   Allergen Reactions   • Dilaudid [Hydromorphone Hcl] Anaphylaxis   • Hydromorphone Anaphylaxis     Other reaction(s): flushing, SOB   • Metronidazole Anaphylaxis and Rash     Other reaction(s): nausea   • Morphine Anaphylaxis, Rash and GI Intolerance     Other reaction(s): hives, flushing   • Esomeprazole Other (See Comments)   • Hydralazine Other (See Comments)     Cannot remember reation  Other reaction(s): vomiting, sweating, chills, itching   • Metformin Other (See Comments)   • Prednisone Other (See Comments)   • Simvastatin Other (See Comments)     Other reaction(s): myalgias   • Sulfamethoxazole-Trimethoprim Other (See Comments)     Other reaction(s): cannot recall   • Valsartan GI Intolerance     Difficulty, swallowing, breathing  Other reaction(s): epigastric pain/GERD   • Amlodipine Rash     Other reaction(s): ankle swelling   • Ciprofloxacin GI Intolerance     Other reaction(s): unknown   • Clindamycin Other (See Comments)     C diff  Other reaction(s): c-diff infection   • Dexlansoprazole Other (See Comments)     Unknown per pt  Other reaction(s): shortness of breath   • Metformin Hydrochloride-Repaglinide [Repaglinide-Metformin Hcl] Other (See Comments)     Unknown per pt     • Nexium [Esomeprazole Magnesium] Diarrhea and GI Intolerance   • Pantoprazole Diarrhea and GI Intolerance     Other reaction(s): allergy to generic only          Physical Exam  /78 (BP Location: Left arm, Patient Position: Sitting, Cuff Size: Adult)   Pulse 69   Ht 5' 7" (1 702 m)   Wt 101 kg (222 lb)   LMP  (LMP Unknown)   BMI 34 77 kg/m²     Constitutional:  see vital signs  Gen: well-developed, normocephalic/atraumatic, well-groomed  Eyes: No inflammation or discharge of conjunctiva or lids; sclera clear   Pharynx: no inflammation, lesion, or mass of lips  Neck: supple, no masses, non-distended  MSK: no inflammation, lesion, mass, or clubbing of nails and digits except for other than mentioned below  SKIN: no visible rashes or skin lesions  Pulmonary/Chest: Effort normal  No respiratory distress  NEURO: cranial nerves grossly intact  PSYCH:  Alert and oriented to person, place, and time; recent and remote memory intact; mood normal, no depression, anxiety, or agitation, judgment and insight good and intact     Ortho Exam  Cervical  Midline spinous process tenderness: + diffuse nonspecific  Muscular Tenderness: + right upper trapezius and right paraspinals  Sensation UE Bilateral:  C5: normal  C6: normal  C7: normal  C8: normal  T1: normal  Strength UE: 5/5 elbow, wrist, fingers bilateral    RIGHT SHOULDER:  Erythema: no  Swelling: no  Increased Warmth: no    Tenderness: + lateral subacromial    ROM  External Rotation:  70  Internal Rotation:  20    Strength  Abduction: 5/5  ER: 5/5  IR: 5/5    Drop-Arm: negative  Emptycan: +  Belly Press:   Lift-off Test:    Bennett: negative  Neer: +        LEFT SHOULDER:  Strength  Abduction: 5/5  ER: 5/5  IR: 5/5    ROM  Touchdown sign: intact    Empty can: negative         __________________________________________________________________________  Large joint arthrocentesis: R subacromial bursa  [place] Protocol:  Procedure performed by: (Dr Kasandra Temple)  Consent: Verbal consent obtained  Risks and benefits: risks, benefits and alternatives were discussed  Consent given by: patient  Time out: Immediately prior to procedure a "time out" was called to verify the correct patient, procedure, equipment, support staff and site/side marked as required    Patient understanding: patient states understanding of the procedure being performed  Site marked: the operative site was marked  Patient identity confirmed: verbally with patient    Supporting Documentation  Indications: pain and diagnostic evaluation   Procedure Details  Location: shoulder - R subacromial bursa  Preparation: Patient was prepped and draped in the usual sterile fashion  Needle size: 22 G  Ultrasound guidance: no  Approach: posterolateral  Medications administered: 4 mL bupivacaine 0 25 %; 40 mg triamcinolone acetonide 40 mg/mL    Patient tolerance: patient tolerated the procedure well with no immediate complications  Dressing:  Sterile dressing applied

## 2022-11-03 ENCOUNTER — OFFICE VISIT (OUTPATIENT)
Dept: PHYSICAL THERAPY | Facility: CLINIC | Age: 67
End: 2022-11-03

## 2022-11-03 DIAGNOSIS — M25.511 ACUTE PAIN OF RIGHT SHOULDER: ICD-10-CM

## 2022-11-03 DIAGNOSIS — M54.2 NECK PAIN: Primary | ICD-10-CM

## 2022-11-03 NOTE — PROGRESS NOTES
Daily Note     Today's date: 11/3/2022  Patient name: Liliane Robertson  : 1955  MRN: [id number]  Referring provider: Kayden Branch DO  Dx:   Encounter Diagnosis     ICD-10-CM    1  Neck pain  M54 2    2  Acute pain of right shoulder  M25 511                   Subjective: Pt reports she had an injection in her R shoulder yesterday and is feeling much better  Objective: See treatment diary below      Assessment: Session kept light today with light ROM passively and AA due to injection yesterday  She noted passive stretching is much improved from her LV  Plan: Continue per plan of care  Precautions: DM2; HTN; CAD; A-Fib; Anxiety;   EPOC: 22  HEP: Access Code: [email or web address]  URL: [email or web address]  Date: 10/24/2022  Prepared by: Alonzo Devlin    Exercises  · Standing Shoulder Extension with Dowel - 1 x daily - 7 x weekly - 3 sets - 10 reps  · Standing Bilateral Shoulder Internal Rotation AAROM with Dowel - 1 x daily - 7 x weekly - 3 sets - 10 reps  · Supine Shoulder Press with Dowel - 1 x daily - 7 x weekly - 3 sets - 10 reps  · Supine Shoulder Flexion Extension AAROM with Dowel - 1 x daily - 7 x weekly - 3 sets - 10 reps  · Standing Scaption Slide with Wall - 1 x daily - 7 x weekly - 3 sets - 10 reps          Manuals 10/26 10/31 11/3          C/S PROM  PF WE          R shoulder PROM  PF WE                                    Neuro Re-Ed             DNF             Chin tuck                                                                              Ther Ex             No money  GTB 2x10           H ABD  RTB 20x           SA wall slide 10x 2x10           TBar flex and CP 10x3" 10x3" x10          TBar ext and FIR 10x3" 10x3" x10          TBar Abd   x10          LPD   45# 2x10           TBand row/ext  BTB 30x ea           Table push up  2x10                                                  Ther Activity             UBE   2'/2'          Pulleys   2'/2'          Gait Training Modalities

## 2022-11-07 ENCOUNTER — OFFICE VISIT (OUTPATIENT)
Dept: PHYSICAL THERAPY | Facility: CLINIC | Age: 67
End: 2022-11-07

## 2022-11-07 DIAGNOSIS — M54.2 NECK PAIN: Primary | ICD-10-CM

## 2022-11-07 DIAGNOSIS — M25.511 ACUTE PAIN OF RIGHT SHOULDER: ICD-10-CM

## 2022-11-07 NOTE — PROGRESS NOTES
Daily Note     Today's date: 2022  Patient name: Uriel Sandy  : 1955  MRN: [id number]  Referring provider: Flavia Mims DO  Dx:   Encounter Diagnosis     ICD-10-CM    1  Neck pain  M54 2    2  Acute pain of right shoulder  M25 511                   Subjective: Pt reports she isnt feeling well today  Objective: See treatment diary below      Assessment: Due to pt not feeling well manual therapy was performed and some light stretching but TE's were held for today as per pt request  Cervical symptoms improved significantly and she was feeling better post manual therapy  Plan: Continue per plan of care  Precautions: DM2; HTN; CAD; A-Fib; Anxiety;   EPOC: 22  HEP: Access Code: [email or web address]  URL: [email or web address]  Date: 10/24/2022  Prepared by: Angela Deleon    Exercises  · Standing Shoulder Extension with Dowel - 1 x daily - 7 x weekly - 3 sets - 10 reps  · Standing Bilateral Shoulder Internal Rotation AAROM with Dowel - 1 x daily - 7 x weekly - 3 sets - 10 reps  · Supine Shoulder Press with Dowel - 1 x daily - 7 x weekly - 3 sets - 10 reps  · Supine Shoulder Flexion Extension AAROM with Dowel - 1 x daily - 7 x weekly - 3 sets - 10 reps  · Standing Scaption Slide with Wall - 1 x daily - 7 x weekly - 3 sets - 10 reps          Manuals 10/26 10/31 11/3 11/7         C/S PROM  PF WE WE         R shoulder PROM  PF WE WE                                   Neuro Re-Ed             DNF             Chin tuck                                                                              Ther Ex             No money  GTB 2x10           H ABD  RTB 20x           SA wall slide 10x 2x10           TBar flex and CP 10x3" 10x3" x10 x10         TBar ext and FIR 10x3" 10x3" x10 x10         TBar Abd   x10 x10         LPD   45# 2x10           TBand row/ext  BTB 30x ea           Table push up  2x10                                                  Ther Activity             UBE   2'/2' 2'/2' Jensen   2'/2' 2'/2'         Gait Training                                       Modalities

## 2022-11-10 ENCOUNTER — APPOINTMENT (OUTPATIENT)
Dept: PHYSICAL THERAPY | Facility: CLINIC | Age: 67
End: 2022-11-10

## 2022-11-29 ENCOUNTER — TELEPHONE (OUTPATIENT)
Dept: OBGYN CLINIC | Facility: CLINIC | Age: 67
End: 2022-11-29

## 2022-11-29 NOTE — TELEPHONE ENCOUNTER
11/29/2022 El please watch for records from Tyra's FMD, Dr Kelli Joy regarding a pelvic infection.

## 2022-12-01 ENCOUNTER — TELEPHONE (OUTPATIENT)
Dept: OBGYN CLINIC | Facility: HOSPITAL | Age: 67
End: 2022-12-01

## 2022-12-01 NOTE — TELEPHONE ENCOUNTER
Caller: Patient    Doctor: Dr Atif Ny    Reason for call: Patient calling to cancel the [facility] scheduled for 12/12/22 with Dr Atif Ny  She is declining to reschedule at this time      Call back#: [phone]

## 2022-12-07 ENCOUNTER — OFFICE VISIT (OUTPATIENT)
Dept: OBGYN CLINIC | Facility: CLINIC | Age: 67
End: 2022-12-07

## 2022-12-07 VITALS
SYSTOLIC BLOOD PRESSURE: 134 MMHG | DIASTOLIC BLOOD PRESSURE: 74 MMHG | WEIGHT: 214 LBS | HEIGHT: 67 IN | BODY MASS INDEX: 33.59 KG/M2

## 2022-12-07 DIAGNOSIS — B37.31 VAGINAL YEAST INFECTION: Primary | ICD-10-CM

## 2022-12-07 RX ORDER — SECNIDAZOLE 2 G/4.8G
GRANULE ORAL
COMMUNITY
Start: 2022-12-01 | End: 2022-12-14

## 2022-12-08 LAB
ALBUMIN SERPL-MCNC: 3.9 G/DL (ref 3.8–4.8)
ALBUMIN/CREAT UR: 9 MG/G CREAT (ref 0–29)
ALBUMIN/GLOB SERPL: 1.5 {RATIO} (ref 1.2–2.2)
ALP SERPL-CCNC: 86 IU/L (ref 44–121)
ALT SERPL-CCNC: 35 IU/L (ref 0–32)
AST SERPL-CCNC: 24 IU/L (ref 0–40)
BASOPHILS # BLD AUTO: 0.1 X10E3/UL (ref 0–0.2)
BASOPHILS NFR BLD AUTO: 1 %
BILIRUB SERPL-MCNC: 0.3 MG/DL (ref 0–1.2)
BUN SERPL-MCNC: 18 MG/DL (ref 8–27)
BUN/CREAT SERPL: 23 (ref 12–28)
CALCIUM SERPL-MCNC: 9.8 MG/DL (ref 8.7–10.3)
CHLORIDE SERPL-SCNC: 103 MMOL/L (ref 96–106)
CHOLEST SERPL-MCNC: 172 MG/DL (ref 100–199)
CO2 SERPL-SCNC: 25 MMOL/L (ref 20–29)
CREAT SERPL-MCNC: 0.8 MG/DL (ref 0.57–1)
CREAT UR-MCNC: 188.7 MG/DL
EGFR: 80 ML/MIN/1.73
EOSINOPHIL # BLD AUTO: 0.1 X10E3/UL (ref 0–0.4)
EOSINOPHIL NFR BLD AUTO: 1 %
ERYTHROCYTE [DISTWIDTH] IN BLOOD BY AUTOMATED COUNT: 11.6 % (ref 11.7–15.4)
EST. AVERAGE GLUCOSE BLD GHB EST-MCNC: 148 MG/DL
GLOBULIN SER-MCNC: 2.6 G/DL (ref 1.5–4.5)
GLUCOSE SERPL-MCNC: 131 MG/DL (ref 70–99)
HBA1C MFR BLD: 6.8 % (ref 4.8–5.6)
HCT VFR BLD AUTO: 44.9 % (ref 34–46.6)
HDLC SERPL-MCNC: 62 MG/DL
HGB BLD-MCNC: 15.2 G/DL (ref 11.1–15.9)
IMM GRANULOCYTES # BLD: 0 X10E3/UL (ref 0–0.1)
IMM GRANULOCYTES NFR BLD: 0 %
LDLC SERPL CALC-MCNC: 90 MG/DL (ref 0–99)
LDLC/HDLC SERPL: 1.5 RATIO (ref 0–3.2)
LYMPHOCYTES # BLD AUTO: 1.6 X10E3/UL (ref 0.7–3.1)
LYMPHOCYTES NFR BLD AUTO: 27 %
MCH RBC QN AUTO: 31.3 PG (ref 26.6–33)
MCHC RBC AUTO-ENTMCNC: 33.9 G/DL (ref 31.5–35.7)
MCV RBC AUTO: 92 FL (ref 79–97)
MICROALBUMIN UR-MCNC: 16.3 UG/ML
MONOCYTES # BLD AUTO: 0.6 X10E3/UL (ref 0.1–0.9)
MONOCYTES NFR BLD AUTO: 11 %
NEUTROPHILS # BLD AUTO: 3.5 X10E3/UL (ref 1.4–7)
NEUTROPHILS NFR BLD AUTO: 60 %
PLATELET # BLD AUTO: 227 X10E3/UL (ref 150–450)
POTASSIUM SERPL-SCNC: 5.1 MMOL/L (ref 3.5–5.2)
PROT SERPL-MCNC: 6.5 G/DL (ref 6–8.5)
RBC # BLD AUTO: 4.86 X10E6/UL (ref 3.77–5.28)
SL AMB VLDL CHOLESTEROL CALC: 20 MG/DL (ref 5–40)
SODIUM SERPL-SCNC: 142 MMOL/L (ref 134–144)
TRIGL SERPL-MCNC: 110 MG/DL (ref 0–149)
TSH SERPL DL<=0.005 MIU/L-ACNC: 2.56 UIU/ML (ref 0.45–4.5)
WBC # BLD AUTO: 5.9 X10E3/UL (ref 3.4–10.8)

## 2022-12-13 NOTE — PROGRESS NOTES
12/14/2022    Assessment/Plan        Problem List Items Addressed This Visit        Endocrine    Diabetes mellitus, type 2 ([facility] [address] ) - Primary    Relevant Orders    HEMOGLOBIN A1C W/ EAG ESTIMATION Lab Collect       Cardiovascular and Mediastinum    Hypertension (Chronic)       Other    Obesity (BMI 30 0-34  9)       Assessment/Plan:  type 2 Diabetes:- Patients diabetes has been well controlled with no complications only on Glipizide 2 5mg daily  Patient's most recent A1c is 6 8% with previous A1c being 6 2% this is probably in the setting of some dietary indiscretion  She is currently working on improving her lifestyle modification, being more mindful of diet/exercise and also weight loss  Blood sugars appears to be in range  Therefore continue with current regime  Discussed that given her controlled diabetes do not think her several episodes of UTI related to her diabetes  Patient is not on any SGLT2 antagonist  Since only on 1 oral AHD, does not need frequent FS testing  Told her ok to check PRN for now with mainly checking when on steroids, feeling ill or having symptoms concerning for hyper or hypoglycemia  Screening  Reminded patient to get a dilated eye exam as recommendation is for annual eye checkup- will follow with [facility] eye care  Up-to-date on lipid, urine microalbumin  Not on statin due to history of myalgia while on statin therapy  LDL at goal w/o statin therapy <100  However given >70 and age 34 with T2DM can re-discuss statin therapy- alternative to simvastatin- possibly pravastatin during next visit   Repeat Urine microalbumin/Lipid 12/23    Nontoxic multinodular thyroid:- I reviewed patient's prior ultrasound in 2018, 2016 which shows several subcentimeter thyroid nodules (Right TR4 0 5cm and left TR2 0 4cm nodule ) which do not need further follow-up unless has compressive symptoms d/t otherwise low concern for malignancy   Patient does not have any compressive symptoms or symptoms of hyperthyroidism at this time  TSH 12/22 was euthyroid  No repeat US needed for now    Subclinical hypothyroidism:- She did have elevated TSH of 4 18 in 2016 subsequent to which she has had frequent TSH checks all remained euthyroid  We will recommend annual TSH check for now d/t concern for subclinical hypothyroidism with risk of progression to clinical 2% per year  Next repeat 12/2023  RTC in 6 months   Patient did not need any refills on today's    CC: DM     History of Present Illness     HPI: Orion Villaseñor is a 69y o  year old female with history of T2DM diagnosed 3 years ago with no microvascular/macrovascular complications, hypertension, PTSD, A fib on Pradaxa/ Lopressor, NAFLD, non toxic multinodular thyroid with ?subclinical hypothyroidism who presents today for her diabetic care  Previously seen by Dr Lakisha Carpenter with last visit 6 months ago  All previous notes/records reviewed  This is transfer of care visit  Previous regime tried:- Metformin caused some GI issues (but says GI issues still remain off metformin)- Tried few years ago  Current regime:- Glipizide 2 5mg daily  (did have to take 5mg daily for a fewer days when BG elevated d/t prednisone effect given for poison IVY infection over the summer)  BG:- Checks it twice a day, Fasting:- 107 (btw ), Evening 120  Periodically does drop to 60's- feels shaky- usually happens when she waits too long in AM and doesn't eat  Rare event  Also has high BG sometimes related to medication changes such as being on steroids    - No glucometer on appointment   - No polyuria, no polyuria  - Patient had 3 UTI and also 2 yeast infection this year  PCP wondering if this was d/t her diabetes  Diet:- Was eating out lately- eating fast food when taking  to doctors visits  But has been getting better now  Does not follow a strict diabetic diet but working on eating more healthier food  Activity:- Tries to get 8000 steps a day  Limited d/t cervical arthritis   Weight:- She is loosing weight since being on strict diet now  Last ophthalmology:-  Last year, no retinopathy  Saw optometrist last week but they didn't do a dilated eye exam    Last podiatry visit:- Does not see one  Last foot exam today 2022  Last lipid:- , LDL at goal  Not on statin d/t allergy noted with severe myalgia  Last urine microalbuminuria:- 2022, normal  On losartan for hypertension  Family hx:- Sister has T2DM on insulin  Social hx:- used to work as hospice nurse, now retired  Denies smoking, alcohol use or other recreational drugs  Review of Systems   Constitutional: Negative for diaphoresis, fatigue and unexpected weight change  Eyes: Negative for photophobia and visual disturbance  Gastrointestinal: Negative for constipation, diarrhea and vomiting  Endocrine: Negative for polydipsia and polyuria  Skin: Negative          Historical Information   Past Medical History:   Diagnosis Date   • Anemia     As a child   • Anxiety    • Arthritis 2016   • Atrial fibrillation ([facility] [address] )    • Cardiac disease     afib   • Coronary artery disease    • Diabetes mellitus ([facility] [address] )    • Diverticulitis of colon    • Fatty liver    • GERD (gastroesophageal reflux disease)    • Hypertension 2016   • Hypertensive urgency 2016   • Hypokalemia 2016   • Hypomagnesemia 2016   • Nephrolithiasis    • New onset a-fib ([facility] [address] ) 2016   • Psychiatric disorder     anxiety   • SOB (shortness of breath) 2016   • Thyroid nodule      Past Surgical History:   Procedure Laterality Date   • AV NODE ABLATION     •  SECTION      x3   • CHOLECYSTECTOMY     • COLONOSCOPY     • ENDOMETRIAL ABLATION     • KIDNEY STONE SURGERY     • OOPHORECTOMY Left     pt not sure but thinks    • UPPER GASTROINTESTINAL ENDOSCOPY       Social History   Social History     Substance and Sexual Activity   Alcohol Use Never     Social History     Substance and Sexual Activity   Drug Use Never     Social History     Tobacco Use   Smoking Status Never   Smokeless Tobacco Never     Family History:   Family History   Problem Relation Age of Onset   • Ovarian cancer Mother 39   • No Known Problems Father    • Ovarian cancer Sister 58   • Ovarian cancer Daughter 36   • ALONZO disease Daughter    • No Known Problems Maternal Grandmother    • No Known Problems Maternal Grandfather    • No Known Problems Paternal Grandmother    • No Known Problems Paternal Grandfather    • Ovarian cancer Daughter 40   • No Known Problems Maternal Aunt    • No Known Problems Maternal Aunt    • No Known Problems Maternal Aunt    • No Known Problems Maternal Aunt    • No Known Problems Paternal Aunt    • No Known Problems Paternal Aunt    • Pancreatic cancer Cousin         29's   • ALONZO disease Son    • No Known Problems Brother    • Colon cancer Neg Hx    • Colon polyps Neg Hx        Meds/Allergies   Current Outpatient Medications   Medication Sig Dispense Refill   • ALPRAZolam (XANAX) 0 25 mg tablet 1 tablet daily     • Azelastine HCl 137 MCG/SPRAY SOLN 1 spray by Each Nare route 2 (two) times a day     • Diclofenac Sodium (VOLTAREN) 1 % APPLY 2 GRAMS EXTERNALLY 4 TIMES DAILY AS NEEDED     • famotidine (PEPCID) 40 MG tablet Take 40 mg by mouth 2 (two) times a day     • glipiZIDE (GLUCOTROL) 5 mg tablet Take 2 5 mg by mouth daily Now being ordered as a 2 5 instead of a 5mg breaking in half       • losartan (COZAAR) 100 MG tablet Take 1 tablet (100 mg total) by mouth daily 90 tablet 3   • metoprolol tartrate (LOPRESSOR) 50 mg tablet 100 mg 2 (two) times a day       • dabigatran etexilate (PRADAXA) 150 mg capsu Take 150 mg by mouth 2 (two) times a day     • dabigatran etexilate (PRADAXA) 150 mg capsu  (Patient not taking: Reported on 12/14/2022)     • methocarbamol (ROBAXIN) 750 mg tablet Take 750 mg by mouth every 8 (eight) hours as needed (Patient not taking: Reported on 11/2/2022)     • triamcinolone (KENALOG) 0 1 % cream APPLY CREAM EXTERNALLY TWICE DAILY FOR 7 DAYS       No current facility-administered medications for this visit  Allergies   Allergen Reactions   • Dilaudid [Hydromorphone Hcl] Anaphylaxis   • Hydromorphone Anaphylaxis     Other reaction(s): flushing, SOB   • Metronidazole Anaphylaxis and Rash     Other reaction(s): nausea   • Morphine Anaphylaxis, Rash and GI Intolerance     Other reaction(s): hives, flushing   • Esomeprazole Other (See Comments)   • Hydralazine Other (See Comments)     Cannot remember reation  Other reaction(s): vomiting, sweating, chills, itching   • Metformin Other (See Comments)   • Prednisone Other (See Comments)   • Simvastatin Other (See Comments)     Other reaction(s): myalgias   • Sulfamethoxazole-Trimethoprim Other (See Comments)     Other reaction(s): cannot recall   • Valsartan GI Intolerance     Difficulty, swallowing, breathing  Other reaction(s): epigastric pain/GERD   • Amlodipine Rash     Other reaction(s): ankle swelling   • Ciprofloxacin GI Intolerance     Other reaction(s): unknown   • Clindamycin Other (See Comments)     C diff  Other reaction(s): c-diff infection   • Dexlansoprazole Other (See Comments)     Unknown per pt  Other reaction(s): shortness of breath   • Metformin Hydrochloride-Repaglinide [Repaglinide-Metformin Hcl] Other (See Comments)     Unknown per pt     • Nexium [Esomeprazole Magnesium] Diarrhea and GI Intolerance   • Pantoprazole Diarrhea and GI Intolerance     Other reaction(s): allergy to generic only       Objective   Vitals: Blood pressure 116/70, pulse 62, height 5' 7" (1 702 m), weight 97 5 kg (215 lb)  Invasive Devices     None                 Physical Exam  Constitutional:       Appearance: Normal appearance  She is obese  Cardiovascular:      Rate and Rhythm: Normal rate and regular rhythm  Pulses: Normal pulses  no weak pulses          Dorsalis pedis pulses are 2+ on the right side and 2+ on the left side  Pulmonary:      Effort: Pulmonary effort is normal    Abdominal:      General: Abdomen is flat  Palpations: Abdomen is soft  Musculoskeletal:      Cervical back: Normal range of motion and neck supple  Feet:      Right foot:      Skin integrity: No ulcer, skin breakdown, erythema, warmth, callus or dry skin  Left foot:      Skin integrity: No ulcer, skin breakdown, erythema, warmth, callus or dry skin  Skin:     Capillary Refill: Capillary refill takes less than 2 seconds  Neurological:      General: No focal deficit present  Mental Status: She is alert and oriented to person, place, and time  Psychiatric:         Mood and Affect: Mood normal        Patient's shoes and socks removed  Right Foot/Ankle   Right Foot Inspection  Skin Exam: skin normal and skin intact  No dry skin, no warmth, no callus, no erythema, no maceration, no abnormal color, no pre-ulcer, no ulcer and no callus  Toe Exam: ROM and strength within normal limits  Sensory   Vibration: diminished  Monofilament testing: diminished    Vascular  Capillary refills: < 3 seconds  The right DP pulse is 2+  Left Foot/Ankle  Left Foot Inspection  Skin Exam: skin normal and skin intact  No dry skin, no warmth, no erythema, no maceration, normal color, no pre-ulcer, no ulcer and no callus  Toe Exam: ROM and strength within normal limits  Sensory   Vibration: diminished  Monofilament testing: intact    Vascular  Capillary refills: < 3 seconds  The left DP pulse is 2+  Assign Risk Category  No deformity present  Loss of protective sensation  No weak pulses  Risk: 1  The history was obtained from the review of the chart and from the patient      Lab Results:       Latest Reference Range & Units 04/07/21 00:00 08/11/21 07:25 06/09/22 08:03 12/07/22 07:34   Hemoglobin A1C 4 8 - 5 6 % 8 9 (E)  8 9 (E) 6 4 (H) 6 2 (H) 6 8 (H)   (H): Data is abnormally high  (E): External lab result     Latest Reference Range & Units 12/07/22 07:34   EXT Creatinine Urine Not Estab  mg/dL 188 7   MICROALBUMIN/CREATININE RATIO 0 - 29 mg/g creat 9   MICROALBUM ,U,RANDOM Not Estab  ug/mL 16 3      Latest Reference Range & Units 12/07/22 07:34   Cholesterol 100 - 199 mg/dL 172   Triglycerides 0 - 149 mg/dL 110   HDL >39 mg/dL 62   LDL Calculated 0 - 99 mg/dL 90   LDL/HDL RATIO 0 0 - 3 2 ratio 1 5   VLDL Cholesterol Ramon 5 - 40 mg/dL 20      Latest Reference Range & Units 05/22/16 20:20 08/21/17 12:24 08/11/21 07:25 02/01/22 07:57 06/09/22 08:03 12/07/22 07:34   TSH, POC 0 450 - 4 500 uIU/mL   2 800 2 190 3 060 2 560   TSH 3RD GENERATON 0 358 - 3 740 uIU/mL 4 819 (H) 2 692       Free T4 0 82 - 1 77 ng/dL    1 15     (H): Data is abnormally high    INDICATION:    E04 1: Nontoxic single thyroid nodule      COMPARISON:  Thyroid sonogram 2/29/2016     TECHNIQUE:   Ultrasound of the thyroid was performed with a high frequency linear transducer in transverse and sagittal planes including volumetric imaging sweeps as well as traditional still imaging technique      FINDINGS:  Thyroid parenchyma is diffusely heterogeneous in echotexture with focal nodule(s) as described below      Right lobe:  4 4 x 1 6 x 1 4 cm  Left lobe:  4 x 1 5 x 1 5 cm  Isthmus:  0 2 cm      Nodule #1  RIGHT lower pole nodule measuring 0 5 x 0 2 x 0 4 cm  This appears to be the nodule previously labeled as mid right lobe  Given differences in measuring technique, no significant change from prior  COMPOSITION:  2 points, solid or almost completely solid   ECHOGENICITY:  2 points, hypoechoic  SHAPE:  0 points, wider-than-tall  MARGIN: 0 points, smooth  ECHOGENIC FOCI:  0 points, none or large comet-tail artifacts  TI-RADS Classification: TR 4 (4-6 points), Moderately suspicious  FNA if > 1 5 cm  Follow if > 1cm        Nodule #2  LEFT midgland nodule measuring 0 5 x 0 3 x 0 5 cm  Given differences in measuring technique, no significant change from prior     COMPOSITION:  1 point, mixed cystic and solid  ECHOGENICITY:  1 point, hyperechoic or isoechoic  SHAPE:  0 points, wider-than-tall  MARGIN: 0 points, smooth  ECHOGENIC FOCI:  0 points, none or large comet-tail artifacts  TI-RADS Classification: TR 2 (2 points), Not suspious  No FNA  There are additional nodules of lesser size and/or TI-RADS score  These do not necessitate additional evaluation based on ACR criteria       IMPRESSION:     No nodule meets current ACR criteria for requiring biopsy or followup ultrasounds  No future appointments

## 2022-12-14 ENCOUNTER — OFFICE VISIT (OUTPATIENT)
Dept: ENDOCRINOLOGY | Facility: HOSPITAL | Age: 67
End: 2022-12-14

## 2022-12-14 VITALS
BODY MASS INDEX: 33.74 KG/M2 | SYSTOLIC BLOOD PRESSURE: 116 MMHG | DIASTOLIC BLOOD PRESSURE: 70 MMHG | WEIGHT: 215 LBS | HEIGHT: 67 IN | HEART RATE: 62 BPM

## 2022-12-14 DIAGNOSIS — I10 PRIMARY HYPERTENSION: Chronic | ICD-10-CM

## 2022-12-14 DIAGNOSIS — E11.65 TYPE 2 DIABETES MELLITUS WITH HYPERGLYCEMIA, WITHOUT LONG-TERM CURRENT USE OF INSULIN (HCC): Primary | ICD-10-CM

## 2022-12-14 DIAGNOSIS — E66.9 OBESITY (BMI 30.0-34.9): ICD-10-CM

## 2023-01-30 NOTE — PROGRESS NOTES
[address] [facility] [facility], [address], [address] [facility]    Assessment/Plan:  1  Vaginal yeast infection    Yeast infection has been treated  There is still some inflammation, which is not uncommon  Discussed DM and glucose control as an important way to prevent vaginal yeast infections and also discussed probiotics/acidophilus to maintain good vaginal vega    Subjective: Lynette Wolfe is a 69 y o     CC:   Chief Complaint   Patient presents with   • Gynecology Problem     Pt states she was diagnosed with Yeast infection and also positive for Wesly [place] and wants to discuss treatment pt has questions about Solosec medication       HPI: Recently diagnosed with a vaginal yeast infection and has been treated  She is still mildly symptomatic and is here to discuss  ROS: Negative except as noted in HPI    No LMP recorded (lmp unknown)  Patient is postmenopausal        She  has no history on file for sexual activity         The following portions of the patient's history were reviewed and updated as appropriate:   Past Medical History:   Diagnosis Date   • Anemia     As a child   • Anxiety    • Arthritis 2016   • Atrial fibrillation ([facility] [address] )    • Cardiac disease     afib   • Coronary artery disease    • Diabetes mellitus ([address] )    • Diverticulitis of colon    • Fatty liver    • GERD (gastroesophageal reflux disease)    • Hypertension 2016   • Hypertensive urgency 2016   • Hypokalemia 2016   • Hypomagnesemia 2016   • Nephrolithiasis    • New onset a-fib ([address] ) 2016   • Psychiatric disorder     anxiety   • SOB (shortness of breath) 2016   • Thyroid nodule      Past Surgical History:   Procedure Laterality Date   • AV NODE ABLATION     •  SECTION      x3   • CHOLECYSTECTOMY     • COLONOSCOPY     • ENDOMETRIAL ABLATION     • KIDNEY STONE SURGERY     • OOPHORECTOMY Left     pt not sure but thinks    • UPPER GASTROINTESTINAL ENDOSCOPY Family History   Problem Relation Age of Onset   • Ovarian cancer Mother 39   • No Known Problems Father    • Ovarian cancer Sister 58   • Ovarian cancer Daughter 36   • ALONZO disease Daughter    • No Known Problems Maternal Grandmother    • No Known Problems Maternal Grandfather    • No Known Problems Paternal Grandmother    • No Known Problems Paternal Grandfather    • Ovarian cancer Daughter 40   • No Known Problems Maternal Aunt    • No Known Problems Maternal Aunt    • No Known Problems Maternal Aunt    • No Known Problems Maternal Aunt    • No Known Problems Paternal Aunt    • No Known Problems Paternal Aunt    • Pancreatic cancer Cousin         29's   • ALONZO disease Son    • No Known Problems Brother    • Colon cancer Neg Hx    • Colon polyps Neg Hx      Social History     Socioeconomic History   • Marital status: /Civil Union     Spouse name: Not on file   • Number of children: Not on file   • Years of education: Not on file   • Highest education level: Not on file   Occupational History   • Not on file   Tobacco Use   • Smoking status: Never   • Smokeless tobacco: Never   Vaping Use   • Vaping Use: Never used   Substance and Sexual Activity   • Alcohol use: Never   • Drug use: Never   • Sexual activity: Not on file   Other Topics Concern   • Not on file   Social History Narrative   • Not on file     Social Determinants of Health     Financial Resource Strain: Not on file   Food Insecurity: Not on file   Transportation Needs: Not on file   Physical Activity: Not on file   Stress: Not on file   Social Connections: Not on file   Intimate Partner Violence: Not on file   Housing Stability: Not on file     Outpatient Medications Marked as Taking for the 12/7/22 encounter (Office Visit) with Akil Perez MD   Medication   • ALPRAZolam (XANAX) 0 25 mg tablet   • Azelastine HCl 137 MCG/SPRAY SOLN   • dabigatran etexilate (PRADAXA) 150 mg capsu   • Diclofenac Sodium (VOLTAREN) 1 %   • famotidine (PEPCID) 40 MG tablet   • glipiZIDE (GLUCOTROL) 5 mg tablet   • losartan (COZAAR) 100 MG tablet   • metoprolol tartrate (LOPRESSOR) 50 mg tablet   • triamcinolone (KENALOG) 0 1 % cream   • [DISCONTINUED] cefuroxime (CEFTIN) 250 mg tablet   • [DISCONTINUED] Solosec 2 g PACK     Allergies   Allergen Reactions   • Dilaudid [Hydromorphone Hcl] Anaphylaxis   • Hydromorphone Anaphylaxis     Other reaction(s): flushing, SOB   • Metronidazole Anaphylaxis and Rash     Other reaction(s): nausea   • Morphine Anaphylaxis, Rash and GI Intolerance     Other reaction(s): hives, flushing   • Esomeprazole Other (See Comments)   • Hydralazine Other (See Comments)     Cannot remember reation  Other reaction(s): vomiting, sweating, chills, itching   • Metformin Other (See Comments)   • Prednisone Other (See Comments)   • Simvastatin Other (See Comments)     Other reaction(s): myalgias   • Sulfamethoxazole-Trimethoprim Other (See Comments)     Other reaction(s): cannot recall   • Valsartan GI Intolerance     Difficulty, swallowing, breathing  Other reaction(s): epigastric pain/GERD   • Amlodipine Rash     Other reaction(s): ankle swelling   • Ciprofloxacin GI Intolerance     Other reaction(s): unknown   • Clindamycin Other (See Comments)     C diff  Other reaction(s): c-diff infection   • Dexlansoprazole Other (See Comments)     Unknown per pt  Other reaction(s): shortness of breath   • Metformin Hydrochloride-Repaglinide [Repaglinide-Metformin Hcl] Other (See Comments)     Unknown per pt     • Nexium [Esomeprazole Magnesium] Diarrhea and GI Intolerance   • Pantoprazole Diarrhea and GI Intolerance     Other reaction(s): allergy to generic only           Objective:  /74 (BP Location: Left arm, Patient Position: Sitting, Cuff Size: Standard)   Ht 5' 7" (1 702 m)   Wt 97 1 kg (214 lb)   LMP  (LMP Unknown)   BMI 33 52 kg/m²            General Appearance: alert and oriented, in no acute distress     Pelvic:       External genitalia: Normal appearance, no abnormal pigmentation, no lesions or masses  Normal Bartholin's and [place]'s  Urinary system: Urethral meatus normal, bladder non-tender  Vaginal: normal mucosa without prolapse or lesions  Normal-appearing physiologic discharge  Cervix: Normal-appearing, well-epithelialized, no gross lesions or masses  No cervical motion tenderness  Adnexa: No adnexal masses or tenderness noted  Uterus: Normal-sized, regular contour, midline, mobile, no uterine tenderness   Extremities: Normal range of motion     Skin: normal, no rash or abnormalities  Neurologic: alert, oriented x3  Psychiatric: Appropriate affect, mood stable, cooperative with exam         Jonathan Cuellar MD

## 2023-02-24 ENCOUNTER — HOSPITAL ENCOUNTER (OUTPATIENT)
Dept: ULTRASOUND IMAGING | Facility: HOSPITAL | Age: 68
End: 2023-02-24
Attending: FAMILY MEDICINE

## 2023-02-24 DIAGNOSIS — E04.1 NONTOXIC UNINODULAR GOITER: ICD-10-CM

## 2023-06-15 LAB
EST. AVERAGE GLUCOSE BLD GHB EST-MCNC: 143 MG/DL
HBA1C MFR BLD: 6.6 % (ref 4.8–5.6)

## 2023-06-16 ENCOUNTER — TELEPHONE (OUTPATIENT)
Dept: OTHER | Facility: OTHER | Age: 68
End: 2023-06-16

## 2023-06-21 ENCOUNTER — EVALUATION (OUTPATIENT)
Dept: PHYSICAL THERAPY | Facility: CLINIC | Age: 68
End: 2023-06-21
Payer: COMMERCIAL

## 2023-06-21 DIAGNOSIS — R42 VERTIGO: Primary | ICD-10-CM

## 2023-06-21 PROCEDURE — 97161 PT EVAL LOW COMPLEX 20 MIN: CPT | Performed by: PHYSICAL THERAPIST

## 2023-06-21 PROCEDURE — 97140 MANUAL THERAPY 1/> REGIONS: CPT | Performed by: PHYSICAL THERAPIST

## 2023-06-21 NOTE — PROGRESS NOTES
PT Evaluation     Today's date: 2023  Patient name: Dior Still  : 1955  MRN: [id number]  Referring provider: Kacey Lawton DO  Dx:   Encounter Diagnosis     ICD-10-CM    1  Vertigo  R42                      Assessment  Assessment details: Dior Still is a 69 y o  female presenting to outpatient physical therapy at [facility] with complaints of vertigo & dizziness  She presents with decreased cervical range of motion, poor balance, decreased tolerance to activity and decreased functional mobility due to Vertigo  (primary encounter diagnosis)   She would benefit from skilled PT services in order to address these deficits and reach maximum level of function   Thank you for the referral!    Impairments: abnormal or restricted ROM, activity intolerance and impaired balance  Understanding of Dx/Px/POC: good   Prognosis: good    Goals  STG  1  Independent with HEP in 2 weeks  2  Decrease dizziness at worst by 50% in 2 weeks    LTG  1  Abolish vertigo in 4 weeks  2  Return to full, unrestricted household chores in 4 weeks    Plan  Patient would benefit from: skilled physical therapy  Planned modality interventions: thermotherapy: hydrocollator packs  Planned therapy interventions: manual therapy, neuromuscular re-education, therapeutic activities and therapeutic exercise  Frequency: 1x week  Duration in weeks: 4  Treatment plan discussed with: patient        Subjective Evaluation    History of Present Illness  Date of onset: 2023  Mechanism of injury: Pt reports gradual onset of progressively worsening vertigo symptoms  Pt reports her last sxs onset was when she was getting up out of bed     Quality of life: fair    Pain  Progression: worsening    Treatments  Previous treatment: medication  Patient Goals  Patient goals for therapy: independence with ADLs/IADLs, return to sport/leisure activities and improved balance  Patient goal: abolish vertigo         Objective     Concurrent "Complaints  Positive for nausea/motion sickness  Negative for headaches and tinnitus    Active Range of Motion   Cervical/Thoracic Spine       Cervical    Flexion: 45 degrees   Extension: 35 degrees      Left lateral flexion: 20 degrees      Right lateral flexion: 20 degrees      Left rotation: 50 degrees  Right rotation: 35 degrees           Neuro Exam:     Dizziness  Positive for vertigo and light-headedness  Negative for diplopia and floating or swimming  Exacerbating factors  Positive for rolling in bed and supine to/from sitting  Negative for bending over, looking up, walking and turning head  Symptoms   Intensity at best: 2/10  Intensity at worst: 10/10  Average intensity: 5/10    Headaches   Patient reports headaches: No      Cervical exam   Ligament Laxity Testing   Alar ligament: WNL  Sharp Miguel A:  WNL  Modified VBI   Left: symptomatic  Right: symptomatic  Cervical palpation: Suboccipital TTP  Seated posture: forward head posture    Oculomotor exam   Oculomotor ROM: WNL  Resting nystagmus: not present   Smooth pursuits: saccadic smooth pursuit  Vertical saccades: hypermetric  Horizontal saccades: hypermetric  Convergence: abnormal  Cover test: normal  Dynamic visual acuity: normal    Positional testing   [place]-Hallpike   Left posterior canal: symptomatic  Duration: 4 (seconds)  Positional testing comment: Fukuda Test + R    TBA:    R post canal - stephanie halpike  L & R roll tests      Balance assessments   MCTSIB   Eyes open level surface: 5 sec  Eyes open foam surface: 5 sec  Eyes closed level surface: 5 sec  Eyes closed foam surface: 5 sec             Precautions: Vertigo      Manuals 6/21            Canalith repositioning maneuvers L post  x3            SOR             STM cervical paraspinal mm             Cervical PROm             Neuro Re-Ed             Chin tuck iso 5x10\" ea            Mid row iso             Shld ext iso             VOR x1             VOR x2             Tandem stance           " SLS             Ther Ex                                                                                                                     Ther Activity                                       Gait Training                                       Modalities

## 2023-06-22 ENCOUNTER — OFFICE VISIT (OUTPATIENT)
Dept: PHYSICAL THERAPY | Facility: CLINIC | Age: 68
End: 2023-06-22
Payer: COMMERCIAL

## 2023-06-22 DIAGNOSIS — R42 VERTIGO: Primary | ICD-10-CM

## 2023-06-22 PROCEDURE — 97140 MANUAL THERAPY 1/> REGIONS: CPT | Performed by: PHYSICAL THERAPIST

## 2023-06-22 NOTE — PROGRESS NOTES
"Daily Note     Today's date: 2023  Patient name: Reyes Chatters  : 1955  MRN: [id number]  Referring provider: Uzma Olvera DO  Dx:   Encounter Diagnosis     ICD-10-CM    1  Vertigo  R42              Subjective: improved dizziness at worst reported today following IE      Objective: See treatment diary below      Assessment: Tolerated treatment well  Patient demonstrates x2 + stephanie halpike today, x1 negative on 3rd attempt, pt would benefit from continued PT      Plan: Progress treatment as tolerated         Precautions: Vertigo      Manuals             Canalith repositioning maneuvers L post  x3            SOR NS            STM cervical paraspinal mm NS            Cervical PROm             Neuro Re-Ed             Chin tuck iso 5x10\" ea            Mid row iso             Shld ext iso             VOR x1             VOR x2             Tandem stance             SLS             Ther Ex                                                                                                                     Ther Activity                                       Gait Training                                       Modalities                                            "

## 2023-06-29 ENCOUNTER — OFFICE VISIT (OUTPATIENT)
Dept: PHYSICAL THERAPY | Facility: CLINIC | Age: 68
End: 2023-06-29
Payer: COMMERCIAL

## 2023-06-29 DIAGNOSIS — R42 VERTIGO: Primary | ICD-10-CM

## 2023-06-29 PROCEDURE — 97140 MANUAL THERAPY 1/> REGIONS: CPT | Performed by: PHYSICAL THERAPIST

## 2023-06-29 NOTE — PROGRESS NOTES
"Daily Note     Today's date: 2023  Patient name: Eliza Arias  : 1955  MRN: [id number]  Referring provider: Sheila Rolon DO  Dx:   Encounter Diagnosis     ICD-10-CM    1  Vertigo  R42              Subjective: improved dizziness at worst reported today following IE      Objective: See treatment diary below      Assessment: Tolerated treatment well  Patient demonstrates x2 + stephanie halpike today, x1 negative on 3rd attempt, pt would benefit from continued PT      Plan: Progress treatment as tolerated         Precautions: Vertigo      Manuals            Canalith repositioning maneuvers L post  x3 L post x2 & H canal x2           SOR NS            STM cervical paraspinal mm NS            Cervical PROm             Neuro Re-Ed             Chin tuck iso 5x10\" ea            Mid row iso             Shld ext iso             VOR x1             VOR x2             Tandem stance             SLS             Ther Ex                                                                                                                     Ther Activity                                       Gait Training                                       Modalities                                            "

## 2023-07-06 ENCOUNTER — OFFICE VISIT (OUTPATIENT)
Dept: PHYSICAL THERAPY | Facility: CLINIC | Age: 68
End: 2023-07-06
Payer: COMMERCIAL

## 2023-07-06 DIAGNOSIS — R42 VERTIGO: Primary | ICD-10-CM

## 2023-07-06 PROCEDURE — 97140 MANUAL THERAPY 1/> REGIONS: CPT | Performed by: PHYSICAL THERAPIST

## 2023-07-06 NOTE — PROGRESS NOTES
Daily Note     Today's date: 2023  Patient name: Jocelin Danielson  : 1955  MRN: [id number]  Referring provider: Fili Quinones DO  Dx:   Encounter Diagnosis     ICD-10-CM    1. Vertigo  R42              Subjective: vertigo symptoms abolished however dizziness remains with driving and walking during head turns      Objective: See treatment diary below. Added VOR x1 to HEP, see handout. Assessment: Tolerated treatment well. Patient demonstrates negative stephanie halpike today, + VOR testing, pt would benefit from continued PT      Plan: Progress treatment as tolerated. Assess HEP technique & effects of implementation.       Precautions: Vertigo      Manuals           Canalith repositioning maneuvers L post  x3 L post x2 & H canal x2           SOR NS  NS          STM cervical paraspinal mm NS  NS          Cervical PROm   23'          Neuro Re-Ed             Chin tuck iso 5x10" ea            Mid row iso             Shld ext iso             VOR x1   8' seated and standing          VOR x2             Tandem stance             SLS             Ther Ex                                                                                                                     Ther Activity                                       Gait Training                                       Modalities

## 2023-07-13 ENCOUNTER — EVALUATION (OUTPATIENT)
Dept: PHYSICAL THERAPY | Facility: CLINIC | Age: 68
End: 2023-07-13
Payer: COMMERCIAL

## 2023-07-13 DIAGNOSIS — R42 VERTIGO: Primary | ICD-10-CM

## 2023-07-13 PROCEDURE — 97140 MANUAL THERAPY 1/> REGIONS: CPT | Performed by: PHYSICAL THERAPIST

## 2023-07-13 PROCEDURE — 97112 NEUROMUSCULAR REEDUCATION: CPT | Performed by: PHYSICAL THERAPIST

## 2023-07-13 NOTE — PROGRESS NOTES
Daily Note     Today's date: 2023  Patient name: Jamal Salazar  : 1955  MRN: [id number]  Referring provider: Miguel A Vargas DO  Dx:   Encounter Diagnosis     ICD-10-CM    1. Vertigo  R42              Subjective: dizziness symptoms improved, at worst 1/10       Objective: See treatment diary below. Added cervical SB + Flx stretching to HEP, see handout. Assessment: Tolerated treatment well with progression of treatment program as noted below requiring verbal and tactile cues from PT for safe execution of therapeutic exercise. Patient  would benefit from continued PT      Plan: Progress treatment as tolerated. Add mid row & shld ext to HEP NV.         Manuals          Canalith repositioning maneuvers L post  x3 L post x2 & H canal x2           SOR NS  NS NS         STM cervical paraspinal mm NS  NS NS         Cervical PROm   23' NS         Neuro Re-Ed             Chin tuck iso 5x10" ea   20x5" ea         Mid row iso    x30 BTB         Shld ext iso    x30 GTB         VOR x1   8' seated and standing          VOR x2             Tandem stance             SLS             Ther Ex             UT & LS stretc    10x10" ea                                                                                                    Ther Activity                                       Gait Training                                       Modalities

## 2023-08-07 ENCOUNTER — HOSPITAL ENCOUNTER (OUTPATIENT)
Dept: MAMMOGRAPHY | Facility: IMAGING CENTER | Age: 68
Discharge: HOME | End: 2023-08-07
Payer: COMMERCIAL

## 2023-08-07 ENCOUNTER — OFFICE VISIT (OUTPATIENT)
Dept: ENDOCRINOLOGY | Facility: HOSPITAL | Age: 68
End: 2023-08-07
Payer: COMMERCIAL

## 2023-08-07 VITALS
WEIGHT: 217.8 LBS | DIASTOLIC BLOOD PRESSURE: 80 MMHG | SYSTOLIC BLOOD PRESSURE: 124 MMHG | HEIGHT: 68 IN | HEART RATE: 54 BPM | BODY MASS INDEX: 33.01 KG/M2

## 2023-08-07 VITALS — BODY MASS INDEX: 32.74 KG/M2 | WEIGHT: 216 LBS | HEIGHT: 68 IN

## 2023-08-07 DIAGNOSIS — E11.9 TYPE 2 DIABETES MELLITUS WITHOUT COMPLICATION, WITHOUT LONG-TERM CURRENT USE OF INSULIN (HCC): Primary | ICD-10-CM

## 2023-08-07 DIAGNOSIS — E55.9 VITAMIN D DEFICIENCY: ICD-10-CM

## 2023-08-07 DIAGNOSIS — Z12.31 SCREENING MAMMOGRAM, ENCOUNTER FOR: ICD-10-CM

## 2023-08-07 DIAGNOSIS — E03.8 SUBCLINICAL HYPOTHYROIDISM: ICD-10-CM

## 2023-08-07 DIAGNOSIS — E04.2 GOITER, NONTOXIC, MULTINODULAR: ICD-10-CM

## 2023-08-07 DIAGNOSIS — E11.42 DIABETIC POLYNEUROPATHY ASSOCIATED WITH TYPE 2 DIABETES MELLITUS (HCC): ICD-10-CM

## 2023-08-07 DIAGNOSIS — I10 PRIMARY HYPERTENSION: Chronic | ICD-10-CM

## 2023-08-07 PROCEDURE — 77067 SCR MAMMO BI INCL CAD: CPT

## 2023-08-07 PROCEDURE — 77063 BREAST TOMOSYNTHESIS BI: CPT

## 2023-08-07 PROCEDURE — 99214 OFFICE O/P EST MOD 30 MIN: CPT | Performed by: INTERNAL MEDICINE

## 2023-08-07 RX ORDER — MULTIVIT-MIN/IRON/FOLIC ACID/K 18-600-40
CAPSULE ORAL DAILY
COMMUNITY

## 2023-08-07 NOTE — PROGRESS NOTES
8/7/2023    Assessment/Plan      Diagnoses and all orders for this visit:    Type 2 diabetes mellitus without complication, without long-term current use of insulin ([address])  -     HEMOGLOBIN A1C W/ EAG ESTIMATION Lab Collect; Future  -     Comprehensive metabolic panel Lab Collect; Future  -     CBC and differential Lab Collect; Future  -     Lipid Panel with Direct LDL reflex Lab Collect; Future  -     T4, free Lab Collect; Future  -     TSH, 3rd generation Lab Collect; Future  -     Albumin / creatinine urine ratio; Future  -     Thyroid Peroxidase and Thyroglobulin Antibodies; Future  -     HEMOGLOBIN A1C W/ EAG ESTIMATION Lab Collect  -     Comprehensive metabolic panel Lab Collect  -     CBC and differential Lab Collect  -     Lipid Panel with Direct LDL reflex Lab Collect  -     T4, free Lab Collect  -     TSH, 3rd generation Lab Collect  -     Albumin / creatinine urine ratio    Primary hypertension  -     HEMOGLOBIN A1C W/ EAG ESTIMATION Lab Collect; Future  -     Comprehensive metabolic panel Lab Collect; Future  -     CBC and differential Lab Collect; Future  -     Lipid Panel with Direct LDL reflex Lab Collect; Future  -     T4, free Lab Collect; Future  -     TSH, 3rd generation Lab Collect; Future  -     Albumin / creatinine urine ratio; Future  -     Thyroid Peroxidase and Thyroglobulin Antibodies; Future  -     HEMOGLOBIN A1C W/ EAG ESTIMATION Lab Collect  -     Comprehensive metabolic panel Lab Collect  -     CBC and differential Lab Collect  -     Lipid Panel with Direct LDL reflex Lab Collect  -     T4, free Lab Collect  -     TSH, 3rd generation Lab Collect  -     Albumin / creatinine urine ratio    Vitamin D deficiency  -     HEMOGLOBIN A1C W/ EAG ESTIMATION Lab Collect; Future  -     Comprehensive metabolic panel Lab Collect; Future  -     CBC and differential Lab Collect; Future  -     Lipid Panel with Direct LDL reflex Lab Collect; Future  -     T4, free Lab Collect;  Future  -     TSH, 3rd generation Lab Collect; Future  -     Albumin / creatinine urine ratio; Future  -     Thyroid Peroxidase and Thyroglobulin Antibodies; Future  -     HEMOGLOBIN A1C W/ EAG ESTIMATION Lab Collect  -     Comprehensive metabolic panel Lab Collect  -     CBC and differential Lab Collect  -     Lipid Panel with Direct LDL reflex Lab Collect  -     T4, free Lab Collect  -     TSH, 3rd generation Lab Collect  -     Albumin / creatinine urine ratio    Diabetic polyneuropathy associated with type 2 diabetes mellitus (HCC)  -     HEMOGLOBIN A1C W/ EAG ESTIMATION Lab Collect; Future  -     Comprehensive metabolic panel Lab Collect; Future  -     CBC and differential Lab Collect; Future  -     Lipid Panel with Direct LDL reflex Lab Collect; Future  -     T4, free Lab Collect; Future  -     TSH, 3rd generation Lab Collect; Future  -     Albumin / creatinine urine ratio; Future  -     Thyroid Peroxidase and Thyroglobulin Antibodies; Future  -     HEMOGLOBIN A1C W/ EAG ESTIMATION Lab Collect  -     Comprehensive metabolic panel Lab Collect  -     CBC and differential Lab Collect  -     Lipid Panel with Direct LDL reflex Lab Collect  -     T4, free Lab Collect  -     TSH, 3rd generation Lab Collect  -     Albumin / creatinine urine ratio    Goiter, nontoxic, multinodular  -     HEMOGLOBIN A1C W/ EAG ESTIMATION Lab Collect; Future  -     Comprehensive metabolic panel Lab Collect; Future  -     CBC and differential Lab Collect; Future  -     Lipid Panel with Direct LDL reflex Lab Collect; Future  -     T4, free Lab Collect; Future  -     TSH, 3rd generation Lab Collect; Future  -     Albumin / creatinine urine ratio; Future  -     Thyroid Peroxidase and Thyroglobulin Antibodies;  Future  -     HEMOGLOBIN A1C W/ EAG ESTIMATION Lab Collect  -     Comprehensive metabolic panel Lab Collect  -     CBC and differential Lab Collect  -     Lipid Panel with Direct LDL reflex Lab Collect  -     T4, free Lab Collect  -     TSH, 3rd generation Lab Collect  -     Albumin / creatinine urine ratio    Subclinical hypothyroidism  -     HEMOGLOBIN A1C W/ EAG ESTIMATION Lab Collect; Future  -     Comprehensive metabolic panel Lab Collect; Future  -     CBC and differential Lab Collect; Future  -     Lipid Panel with Direct LDL reflex Lab Collect; Future  -     T4, free Lab Collect; Future  -     TSH, 3rd generation Lab Collect; Future  -     Albumin / creatinine urine ratio; Future  -     Thyroid Peroxidase and Thyroglobulin Antibodies; Future  -     HEMOGLOBIN A1C W/ EAG ESTIMATION Lab Collect  -     Comprehensive metabolic panel Lab Collect  -     CBC and differential Lab Collect  -     Lipid Panel with Direct LDL reflex Lab Collect  -     T4, free Lab Collect  -     TSH, 3rd generation Lab Collect  -     Albumin / creatinine urine ratio    Other orders  -     Vitamin D, Cholecalciferol, 50 MCG (2000 UT) CAPS; Take by mouth in the morning        Assessment/Plan:  1. Type 2 diabetes. Hemoglobin A1c is 6.6%. This does demonstrate improved blood sugar control and does demonstrate excellent diabetes control. For now, she will continue the same glipizide 2.5 mg daily. I do agree with at least doubling her glipizide if she is ever on steroids again. She will continue to work on testing her blood sugars once or twice a day. She will continue to work on diet and regular exercise. 2.  Diabetic neuropathy. She has unchanged neuropathic symptoms. Diabetic foot exams are up-to-date. 3.  Nontoxic multinodular goiter. She had a recent thyroid ultrasound showing continued very small subcentimeter nonworrisome spongiform thyroid nodules. Unless symptomatology changes, she does not need a repeat thyroid ultrasound. 4.  Subclinical hypothyroidism.   She has had a mildly elevated TSH in the past.  Most recent TSH was normal.  I will continue to follow her thyroid function studies over time and I will check thyroid antibodies to see if she has an underlying Hashimoto's thyroiditis. 5.  Hypertension. She is normotensive in the office on her current dose of losartan and metoprolol. 6.  Vitamin D deficiency. She will continue the same vitamin D 2000 units daily. I have asked her to follow-up in 6 months with preceding hemoglobin A1c, CMP, CBC, lipid panel, TSH, free T4, thyroid peroxidase and antithyroglobulin antibodies, and urine microalbumin to creatinine ratio. CC: Diabetes type II, thyroid, blood pressure, vitamin D follow-up    History of Present Illness     HPI: Kristin Boyle is a 77y.o. year old female with type 2 diabetes with neuropathy for 5 years, hypertension, hyperlipidemia, multinodular goiter, subclinical hypothyroidism, vitamin D deficiency for follow-up visit. She is on oral agents at home and takes lipids only 2.5 mg daily. She denies any polyphagia, polydipsia,  and blurry vision. She has once a night nocturia and some polyuria. She has numbness and pain in the toes and ankles. She denies chest pain or shortness of breath. She denies nephropathy, retinopathy, heart attack, stroke and claudication but does admit to neuropathy. Hypoglycemic episodes: No rare. The patient's last eye exam was in 2021. The patient's last foot exam was in December 2022 at endocrine office visit. Last A1C was   Lab Results   Component Value Date    HGBA1C 6.6 (H) 06/14/2023   . Blood Sugar/Glucometer/Pump/CGM review: checks blood sugars 2 times a day in am and in the pm. 110 in am and up to 140-180 in the pm.     She is a nontoxic multinodular goiter with ultrasound in 2016 and 2018 showing subcentimeter thyroid nodules that did not need further  follow-up. Last ultrasound in February 2023 showed the same subcentimeter thyroid nodules that were spongiform. She denies compressive thyroid symptoms or difficulties with swallowing.   She has no history of head or neck radiation in the past.    She has a history of subclinical hypothyroidism with a mildly elevated TSH. She is on no thyroid hormone replacement as yet. She denies heat or cold intolerance, palpitation, or tremors. She will have occasional diarrhea once a week. She denies weight changes. She does have some fatigue. She has some insomnia and anxiety and will toss and turn at night. She denies constipation. She has a history of vitamin D deficiency and just restarted vitamin d 2000 units daily for about 1 month. She has a history of LDL above 100 but had myalgia on statin therapy in the past.    She has hypertension and takes losartan 100 mg daily and metoprolol 100 mg twice daily. Review of Systems   Constitutional: Positive for fatigue. Negative for unexpected weight change. Not exercising as much recently due to arthritis pains. Does walk dog regularly. HENT: Negative for trouble swallowing. No XRT to the head or neck in the past.   Eyes: Positive for visual disturbance. Some blurry vision. Wears glasses. Respiratory: Negative for chest tightness and shortness of breath. Cardiovascular: Positive for leg swelling. Negative for chest pain and palpitations. Having in the feet. Gastrointestinal: Positive for diarrhea. Negative for abdominal pain, constipation and nausea. Occasional diarrhea about once a week. Endocrine: Negative for cold intolerance, heat intolerance, polydipsia, polyphagia and polyuria. Some polyuria if sugar goes up and drinks a lot of water in general. Nocturia once a night. Genitourinary:        Got several UTI's this year and some yeast infections. Had this after prednisone usage. Musculoskeletal: Positive for arthralgias. Shoulder and knee pain. Skin: Negative for rash and wound. Neurological: Positive for dizziness, numbness and headaches. Negative for tremors, weakness and light-headedness. Numbness int he toes na pain in the toes an ankles.  Had vestibular PT recently for vertigo. Has history of migraines and lingering headaches post COVID. Psychiatric/Behavioral: Positive for sleep disturbance. The patient is nervous/anxious. Some anxiety today. Toss and turn al night with sleeping, wakes early and can not get back to sleep. This has happened with a new alprazolam .         Historical Information   Past Medical History:   Diagnosis Date   • Anemia     As a child   • Anxiety    • Arthritis 2016   • Atrial fibrillation ([address])    • Diabetes mellitus ([address])    • Diverticulitis of colon    • Fatty liver    • GERD (gastroesophageal reflux disease)    • Hypertension 2016   • Hypertensive urgency 2016   • Hypokalemia 2016   • Hypomagnesemia 2016   • Nephrolithiasis    • Ongoing symptomatic disease due to COVID-19 virus    • PTSD (post-traumatic stress disorder)    • Thyroid nodule      Past Surgical History:   Procedure Laterality Date   • AV NODE ABLATION     •  SECTION      x3   • CHOLECYSTECTOMY     • COLONOSCOPY     • ENDOMETRIAL ABLATION     • KIDNEY STONE SURGERY     • OOPHORECTOMY Left     pt not sure but thinks    • UPPER GASTROINTESTINAL ENDOSCOPY       Social History   Social History     Substance and Sexual Activity   Alcohol Use Never     Social History     Substance and Sexual Activity   Drug Use Never     Social History     Tobacco Use   Smoking Status Never   Smokeless Tobacco Never     Family History:   Family History   Problem Relation Age of Onset   • Ovarian cancer Mother 39   • Heart disease Mother    • Heart attack Father    • Diabetes type II Sister    • Ovarian cancer Sister 58   • Diabetes unspecified Brother         prediabetes   • Hypertension Brother    • Obesity Brother    • No Known Problems Maternal Aunt    • Diabetes type II Maternal Aunt    • No Known Problems Maternal Aunt    • Diabetes type II Maternal Aunt    • No Known Problems Paternal Aunt    • No Known Problems Paternal Aunt    • No Known Problems Maternal Grandmother    • No Known Problems Maternal Grandfather    • No Known Problems Paternal Grandmother    • No Known Problems Paternal Grandfather    • ALONZO disease Daughter    • No Known Problems Daughter    • ALONZO disease Son    • Pancreatic cancer Cousin         29's   • Colon cancer Neg Hx    • Colon polyps Neg Hx        Meds/Allergies   Current Outpatient Medications   Medication Sig Dispense Refill   • ALPRAZolam (XANAX) 0.25 mg tablet Take 1 tablet by mouth 2 (two) times a day as needed     • dabigatran etexilate (PRADAXA) 150 mg capsu Take 150 mg by mouth 2 (two) times a day     • Diclofenac Sodium (VOLTAREN) 1 % APPLY 2 GRAMS EXTERNALLY 4 TIMES DAILY AS NEEDED     • famotidine (PEPCID) 40 MG tablet Take 40 mg by mouth 2 (two) times a day     • GLIPIZIDE PO Take 2.5 mg by mouth daily Now being ordered as a 2.5 instead of a 5mg breaking in half. • losartan (COZAAR) 100 MG tablet Take 1 tablet (100 mg total) by mouth daily 90 tablet 3   • metoprolol tartrate (LOPRESSOR) 50 mg tablet 100 mg 2 (two) times a day       • triamcinolone (KENALOG) 0.1 % cream APPLY CREAM EXTERNALLY TWICE DAILY FOR 7 DAYS     • Vitamin D, Cholecalciferol, 50 MCG (2000 UT) CAPS Take by mouth in the morning       No current facility-administered medications for this visit.      Allergies   Allergen Reactions   • Dilaudid [Hydromorphone Hcl] Anaphylaxis   • Hydromorphone Anaphylaxis     Other reaction(s): flushing, SOB   • Metronidazole Anaphylaxis and Rash     Other reaction(s): nausea   • Morphine Anaphylaxis, Rash and GI Intolerance     Other reaction(s): hives, flushing   • Esomeprazole Other (See Comments)   • Hydralazine Other (See Comments)     Cannot remember reation  Other reaction(s): vomiting, sweating, chills, itching   • Metformin Other (See Comments)   • Prednisone Other (See Comments)   • Simvastatin Other (See Comments)     Other reaction(s): myalgias   • Sulfamethoxazole-Trimethoprim Other (See Comments)     Other reaction(s): cannot recall   • Valsartan GI Intolerance     Difficulty, swallowing, breathing  Other reaction(s): epigastric pain/GERD   • Amlodipine Rash     Other reaction(s): ankle swelling   • Ciprofloxacin GI Intolerance     Other reaction(s): unknown   • Clindamycin Other (See Comments)     C diff  Other reaction(s): c-diff infection   • Dexlansoprazole Other (See Comments)     Unknown per pt  Other reaction(s): shortness of breath   • Metformin Hydrochloride-Repaglinide [Repaglinide-Metformin Hcl] Other (See Comments)     Unknown per pt     • Nexium [Esomeprazole Magnesium] Diarrhea and GI Intolerance   • Pantoprazole Diarrhea and GI Intolerance     Other reaction(s): allergy to generic only       Objective   Vitals: Blood pressure 124/80, pulse (!) 54, height 5' 8" (1.727 m), weight 98.8 kg (217 lb 12.8 oz). Invasive Devices     None                 Physical Exam  Vitals reviewed. Constitutional:       Appearance: Normal appearance. She is well-developed. HENT:      Head: Normocephalic and atraumatic. Eyes:      Extraocular Movements: Extraocular movements intact. Conjunctiva/sclera: Conjunctivae normal.      Comments: No lid lag, stare, proptosis, or periorbital edema. Neck:      Thyroid: No thyromegaly. Vascular: No carotid bruit. Comments: Thyroid normal in size without palpable thyroid nodules. Cardiovascular:      Rate and Rhythm: Normal rate and regular rhythm. Heart sounds: Normal heart sounds. No murmur heard. Pulmonary:      Effort: Pulmonary effort is normal.      Breath sounds: Normal breath sounds. No wheezing. Abdominal:      Palpations: Abdomen is soft. Musculoskeletal:         General: No deformity. Normal range of motion. Cervical back: Normal range of motion and neck supple. Right lower leg: No edema. Left lower leg: No edema. Comments: No tremor of the outstretched hands. Lymphadenopathy:      Cervical: No cervical adenopathy. Skin:     General: Skin is warm and dry. Findings: No rash. Neurological:      Mental Status: She is alert and oriented to person, place, and time. Deep Tendon Reflexes: Reflexes are normal and symmetric. Comments: Patellar deep tendon reflexes normal.         The history was obtained from the review of the chart and from the patient.     Lab Results:    Most recent Alc is  Lab Results   Component Value Date    HGBA1C 6.6 (H) 06/14/2023               Lab Results   Component Value Date    CREATININE 0.80 12/07/2022    CREATININE 0.89 06/09/2022    CREATININE 0.87 08/11/2021    BUN 18 12/07/2022     12/11/2015    K 5.1 12/07/2022     12/07/2022    CO2 25 12/07/2022     eGFR   Date Value Ref Range Status   12/07/2022 80 >59 mL/min/1.73 Final   06/29/2018 54 ml/min/1.73sq m Final         Lab Results   Component Value Date    HDL 62 12/07/2022    TRIG 110 12/07/2022       Lab Results   Component Value Date    ALT 35 (H) 12/07/2022    AST 24 12/07/2022    ALKPHOS 155 (H) 06/29/2018       Lab Results   Component Value Date    TSH 2.560 12/07/2022    FREET4 1.15 02/01/2022             Future Appointments   Date Time Provider [facility] [address]   2/23/2024  8:00 AM Amara Gallardo MD [facility]

## 2023-08-07 NOTE — PATIENT INSTRUCTIONS
Last hgba1c is 6.6%. this is improved and much better. No change in glipizide 2.5 mg daily. Keep working on [employer] and regular activity. Continue to test blood sugars 1-2 times a day. Follow up in 6 months with blood work.

## 2023-08-16 ENCOUNTER — EVALUATION (OUTPATIENT)
Dept: PHYSICAL THERAPY | Facility: CLINIC | Age: 68
End: 2023-08-16
Payer: COMMERCIAL

## 2023-08-16 DIAGNOSIS — R42 DIZZINESS: Primary | ICD-10-CM

## 2023-08-16 PROCEDURE — 97161 PT EVAL LOW COMPLEX 20 MIN: CPT | Performed by: PHYSICAL THERAPIST

## 2023-08-16 PROCEDURE — 97140 MANUAL THERAPY 1/> REGIONS: CPT | Performed by: PHYSICAL THERAPIST

## 2023-08-16 NOTE — LETTER
2023    Sujey Muhammad MD  [address] [facility]  [address] [place] [address]    Patient: Shruti Power   YOB: 1955   Date of Visit: 2023     Encounter Diagnosis     ICD-10-CM    1. Leonela Jacobs           Dear Dr. Ellis Christopher: Thank you for your recent referral of Shruti Power. Please review the attached evaluation summary from Tyra's recent visit. Please verify that you agree with the plan of care by signing the attached order. If you have any questions or concerns, please do not hesitate to call. I sincerely appreciate the opportunity to share in the care of one of your patients and hope to have another opportunity to work with you in the near future. Sincerely,    Cristofer Alvarez, PT      Referring Provider:      I certify that I have read the below Plan of Care and certify the need for these services furnished under this plan of treatment while under my care. Sujey Muhammad MD  [address] [address]  Via Fax: [phone]          PT Evaluation     Today's date: 2023  Patient name: Shruti Power  : 1955  MRN: [id number]  Referring provider: David Roy DO  Dx:   Encounter Diagnosis     ICD-10-CM    1. Dizziness  R42           Assessment  Assessment details: Shruti Power is a 69 y.o. female presenting to outpatient physical therapy at [facility] with complaints of vertigo & dizziness  She presents with decreased cervical range of motion, poor balance, decreased tolerance to activity and decreased functional mobility due to Vertigo  (primary encounter diagnosis).  She would benefit from skilled PT services in order to address these deficits and reach maximum level of function.  Thank you for the referral!    Impairments: abnormal or restricted ROM, activity intolerance and impaired balance  Understanding of Dx/Px/POC: good   Prognosis: good    Goals  STG  1.  Independent with HEP in 2 weeks  2. Decrease dizziness at worst by 50% in 2 weeks    LTG  1. Abolish vertigo in 4 weeks  2. Return to full, unrestricted household chores in 4 weeks    Plan  Patient would benefit from: skilled physical therapy  Planned modality interventions: thermotherapy: hydrocollator packs  Planned therapy interventions: manual therapy, neuromuscular re-education, therapeutic activities and therapeutic exercise  Frequency: 1x week  Duration in weeks: 4  Treatment plan discussed with: patient        Subjective Evaluation    History of Present Illness  Date of onset: 8/9/2023  Mechanism of injury: Pt reports gradual onset of progressively worsening vertigo symptoms. Pt reports her last sxs onset was when she was laying down onto L side. Quality of life: fair    Patient Goals  Patient goals for therapy: independence with ADLs/IADLs, return to sport/leisure activities and improved balance  Patient goal: abolish vertigo   Pain  Progression: worsening    Treatments  Previous treatment: medication        Objective     Concurrent Complaints  Positive for nausea/motion sickness. Negative for headaches and tinnitus    Active Range of Motion   Cervical/Thoracic Spine       Cervical    Flexion: 45 degrees   Extension: 35 degrees      Left lateral flexion: 20 degrees      Right lateral flexion: 20 degrees      Left rotation: 50 degrees  Right rotation: 35 degrees           Neuro Exam:     Dizziness  Positive for vertigo and light-headedness. Negative for diplopia and floating or swimming. Exacerbating factors  Positive for rolling in bed and supine to/from sitting. Negative for bending over, looking up, walking and turning head. Symptoms   Intensity at best: 2/10  Intensity at worst: 10/10  Average intensity: 5/10    Headaches   Patient reports headaches: No.     Cervical exam   Ligament Laxity Testing   Alar ligament: WNL  Sharp Miguel A:  WNL  Modified VBI   Left: symptomatic  Right: symptomatic  Cervical palpation: Suboccipital TTP  Seated posture: forward head posture    Oculomotor exam   Oculomotor ROM: WNL  Resting nystagmus: not present   Smooth pursuits: saccadic smooth pursuit  Vertical saccades: hypermetric  Horizontal saccades: hypermetric  Convergence: abnormal  Cover test: normal  Dynamic visual acuity: normal    Positional testing   Liz-Hallpike   Left posterior canal: symptomatic  Duration: 4 (seconds)  Positional testing comment: Fukuda Test + R    TBA:    R post canal - liz halpike  L & R roll tests      Balance assessments   MCTSIB   Eyes open level surface: 5 sec  Eyes open foam surface: 5 sec  Eyes closed level surface: 5 sec  Eyes closed foam surface: 5 sec            Precautions: Vertigo      Manuals 8/16            Canalith repositioning maneuvers             SOR NS            STM cervical paraspinal mm NS            Cervical PROM NS            Neuro Re-Ed             Chin tuck iso 5x10" ea            Mid row iso             Shld ext iso             VOR x1             VOR x2             Tandem stance             SLS             Ther Ex                                                                                                                     Ther Activity                                       Gait Training                                       Modalities

## 2023-08-16 NOTE — PROGRESS NOTES
PT Evaluation     Today's date: 2023  Patient name: Amara Malik  : 1955  MRN: [id number]  Referring provider: Evelyn Montoya DO  Dx:   Encounter Diagnosis     ICD-10-CM    1. Dizziness  R42           Assessment  Assessment details: Amara Malik is a 69 y.o. female presenting to outpatient physical therapy at [facility] with complaints of vertigo & dizziness  She presents with decreased cervical range of motion, poor balance, decreased tolerance to activity and decreased functional mobility due to Vertigo  (primary encounter diagnosis).  She would benefit from skilled PT services in order to address these deficits and reach maximum level of function.  Thank you for the referral!    Impairments: abnormal or restricted ROM, activity intolerance and impaired balance  Understanding of Dx/Px/POC: good   Prognosis: good    Goals  STG  1. Independent with HEP in 2 weeks  2. Decrease dizziness at worst by 50% in 2 weeks    LTG  1. Abolish vertigo in 4 weeks  2. Return to full, unrestricted household chores in 4 weeks    Plan  Patient would benefit from: skilled physical therapy  Planned modality interventions: thermotherapy: hydrocollator packs  Planned therapy interventions: manual therapy, neuromuscular re-education, therapeutic activities and therapeutic exercise  Frequency: 1x week  Duration in weeks: 4  Treatment plan discussed with: patient        Subjective Evaluation    History of Present Illness  Date of onset: 2023  Mechanism of injury: Pt reports gradual onset of progressively worsening vertigo symptoms. Pt reports her last sxs onset was when she was laying down onto L side.    Quality of life: fair    Patient Goals  Patient goals for therapy: independence with ADLs/IADLs, return to sport/leisure activities and improved balance  Patient goal: abolish vertigo   Pain  Progression: worsening    Treatments  Previous treatment: medication        Objective     Concurrent Complaints  Positive for nausea/motion sickness. Negative for headaches and tinnitus    Active Range of Motion   Cervical/Thoracic Spine       Cervical    Flexion: 45 degrees   Extension: 35 degrees      Left lateral flexion: 20 degrees      Right lateral flexion: 20 degrees      Left rotation: 50 degrees  Right rotation: 35 degrees           Neuro Exam:     Dizziness  Positive for vertigo and light-headedness. Negative for diplopia and floating or swimming. Exacerbating factors  Positive for rolling in bed and supine to/from sitting. Negative for bending over, looking up, walking and turning head. Symptoms   Intensity at best: 2/10  Intensity at worst: 10/10  Average intensity: 5/10    Headaches   Patient reports headaches: No.     Cervical exam   Ligament Laxity Testing   Alar ligament: WNL  Sharp Miguel A:  WNL  Modified VBI   Left: symptomatic  Right: symptomatic  Cervical palpation: Suboccipital TTP  Seated posture: forward head posture    Oculomotor exam   Oculomotor ROM: WNL  Resting nystagmus: not present   Smooth pursuits: saccadic smooth pursuit  Vertical saccades: hypermetric  Horizontal saccades: hypermetric  Convergence: abnormal  Cover test: normal  Dynamic visual acuity: normal    Positional testing   Livingston-Hallpike   Left posterior canal: symptomatic  Duration: 4 (seconds)  Positional testing comment: Fukuda Test + R    TBA:    R post canal - stephanie halpike  L & R roll tests      Balance assessments   MCTSIB   Eyes open level surface: 5 sec  Eyes open foam surface: 5 sec  Eyes closed level surface: 5 sec  Eyes closed foam surface: 5 sec             Precautions: Vertigo      Manuals 8/16            Canalith repositioning maneuvers             SOR NS            STM cervical paraspinal mm NS            Cervical PROM NS            Neuro Re-Ed             Chin tuck iso 5x10" ea            Mid row iso             Shld ext iso             VOR x1             VOR x2             Tandem stance SLS             Ther Ex                                                                                                                     Ther Activity                                       Gait Training                                       Modalities

## 2023-08-21 ENCOUNTER — OFFICE VISIT (OUTPATIENT)
Dept: PHYSICAL THERAPY | Facility: CLINIC | Age: 68
End: 2023-08-21
Payer: COMMERCIAL

## 2023-08-21 DIAGNOSIS — R42 DIZZINESS: Primary | ICD-10-CM

## 2023-08-21 PROCEDURE — 97140 MANUAL THERAPY 1/> REGIONS: CPT | Performed by: PHYSICAL THERAPIST

## 2023-08-21 NOTE — PROGRESS NOTES
Daily Note     Today's date: 2023  Patient name: Cinthia Waters  : 1955  MRN: [id number]  Referring provider: Marychuy Torres DO  Dx:   Encounter Diagnosis     ICD-10-CM    1. Dizziness  R42           Subjective: some slight dizziness when looking down over the weekend      Objective: See treatment diary below      Assessment: Tolerated treatment well. Patient demonstrated fatigue post treatment, exhibited good technique with therapeutic exercises and would benefit from continued PT      Plan: Progress treatment as tolerated.        Precautions: Vertigo      Manuals             Canalith repositioning maneuvers NS            SOR NS            STM cervical paraspinal mm NS            Cervical PROM NS            Neuro Re-Ed             Chin tuck iso 5x10" ea            Mid row iso             Shld ext iso             VOR x1             VOR x2             Tandem stance             SLS             Ther Ex             Cervical SB stretch 10x10" ea            Cervical Rot stretch 10x10" ea                                                                                          Ther Activity                                       Gait Training                                       Modalities

## 2023-08-23 ENCOUNTER — OFFICE VISIT (OUTPATIENT)
Dept: PHYSICAL THERAPY | Facility: CLINIC | Age: 68
End: 2023-08-23
Payer: COMMERCIAL

## 2023-08-23 DIAGNOSIS — R42 DIZZINESS: Primary | ICD-10-CM

## 2023-08-23 PROCEDURE — 97140 MANUAL THERAPY 1/> REGIONS: CPT | Performed by: PHYSICAL THERAPIST

## 2023-08-23 PROCEDURE — 97110 THERAPEUTIC EXERCISES: CPT | Performed by: PHYSICAL THERAPIST

## 2023-08-23 NOTE — PROGRESS NOTES
Daily Note     Today's date: 2023  Patient name: Sebastien Garcia  : 1955  MRN: [id number]  Referring provider: Azalea Dove DO  Dx:   Encounter Diagnosis     ICD-10-CM    1. Dizziness  R42           Subjective: continued sxs improvement since last visit       Objective: See treatment diary below      Assessment: Tolerated treatment well with progression of treatment program as noted below requiring verbal and tactile cues from PT for safe execution of therapeutic exercise. Patient with + response to conservative treatment for cervicogenic dizziness and  demonstrated fatigue post treatment, exhibited good technique with therapeutic exercises and would benefit from continued PT      Plan: Progress treatment as tolerated. Continue with cervicothoracic mobility and stability therex for decreased dizziness.      Precautions: Vertigo      Manuals             Canalith repositioning maneuvers             SOR NS            STM cervical paraspinal mm NS            Cervical PROM NS            Neuro Re-Ed             Chin tuck iso 20x5" ea supine            Mid row iso x20 BTB            Shld ext iso x20 GTB            VOR x1             VOR x2             Tandem stance             SLS             Ther Ex             Cervical SB stretch 10x10" ea            Cervical Rot stretch 10x10" ea                                                                                          Ther Activity             Pt ed: HEP NS 2'                         Gait Training                                       Modalities

## 2023-08-28 ENCOUNTER — APPOINTMENT (OUTPATIENT)
Dept: PHYSICAL THERAPY | Facility: CLINIC | Age: 68
End: 2023-08-28
Payer: COMMERCIAL

## 2023-08-30 ENCOUNTER — APPOINTMENT (OUTPATIENT)
Dept: PHYSICAL THERAPY | Facility: CLINIC | Age: 68
End: 2023-08-30
Payer: COMMERCIAL

## 2023-09-08 ENCOUNTER — OFFICE VISIT (OUTPATIENT)
Dept: PHYSICAL THERAPY | Facility: CLINIC | Age: 68
End: 2023-09-08
Payer: COMMERCIAL

## 2023-09-08 DIAGNOSIS — R42 DIZZINESS: Primary | ICD-10-CM

## 2023-09-08 PROCEDURE — 97140 MANUAL THERAPY 1/> REGIONS: CPT | Performed by: PHYSICAL THERAPIST

## 2023-09-11 ENCOUNTER — OFFICE VISIT (OUTPATIENT)
Dept: PHYSICAL THERAPY | Facility: CLINIC | Age: 68
End: 2023-09-11
Payer: COMMERCIAL

## 2023-09-11 DIAGNOSIS — R42 DIZZINESS: Primary | ICD-10-CM

## 2023-09-11 PROCEDURE — 97140 MANUAL THERAPY 1/> REGIONS: CPT | Performed by: PHYSICAL THERAPIST

## 2023-09-11 NOTE — PROGRESS NOTES
Daily Note     Today's date: 2023  Patient name: Owen Blanco  : 1955  MRN: [id number]  Referring provider: Amada Fritz DO  Dx:   Encounter Diagnosis     ICD-10-CM    1. Dizziness  R42           Subjective: vertigo has virtually abolished with no dizziness reported     Objective: See treatment diary below    Assessment: Tolerated treatment well with progression of treatment program as noted below requiring verbal and tactile cues from PT for safe execution of therapeutic exercise. Patient with + response to conservative treatment for cervicogenic dizziness and  demonstrated fatigue post treatment, exhibited good technique with therapeutic exercises and would benefit from continued PT      Plan: Progress treatment as tolerated. Continue with cervicothoracic mobility and stability therex for decreased dizziness.      Precautions: Vertigo      Manuals             Canalith repositioning maneuvers             SOR NS            STM cervical paraspinal mm NS            Cervical PROM NS            Neuro Re-Ed             Chin tuck iso 20x5" ea supine            Mid row iso x20 OTB            Shld ext iso x20 YTB            VOR x1             VOR x2             Tandem stance             SLS             Ther Ex             Cervical SB stretch 10x10" ea            Cervical Rot stretch 10x10" ea                                                                                          Ther Activity             Pt ed: HEP NS 2'                         Gait Training                                       Modalities

## 2023-09-13 ENCOUNTER — OFFICE VISIT (OUTPATIENT)
Dept: PHYSICAL THERAPY | Facility: CLINIC | Age: 68
End: 2023-09-13
Payer: COMMERCIAL

## 2023-09-13 DIAGNOSIS — R42 DIZZINESS: Primary | ICD-10-CM

## 2023-09-13 PROCEDURE — 97164 PT RE-EVAL EST PLAN CARE: CPT | Performed by: PHYSICAL THERAPIST

## 2023-09-13 PROCEDURE — 97140 MANUAL THERAPY 1/> REGIONS: CPT | Performed by: PHYSICAL THERAPIST

## 2023-09-13 NOTE — PROGRESS NOTES
PT Re-Evaluation     Today's date: 2023  Patient name: Gordon Rg  : 1955  MRN: [id number]  Referring provider: Kaylee Gutierrez DO  Dx:   Encounter Diagnosis     ICD-10-CM    1. Dizziness  R42           Assessment  Assessment details: Gordon Rg is a 69 y.o. female seen in outpatient physical therapy at [facility] with complaints of vertigo & dizziness  She has been treated for decreased cervical range of motion, poor balance, decreased tolerance to activity and decreased functional mobility due to Vertigo  (primary encounter diagnosis).  She would benefit from skilled PT services in order to address these deficits and reach maximum level of function.  Thank you for the referral!    Impairments: abnormal or restricted ROM, activity intolerance and impaired balance  Understanding of Dx/Px/POC: good   Prognosis: good    Goals  STG  1. Independent with HEP in 2 weeks     Goal met  2. Decrease dizziness at worst by 50% in 2 weeks   Goal met    LTG  1. Abolish vertigo in 4 weeks      Goal met  2. Return to full, unrestricted household chores in 4 weeks  Goal met    Plan  Patient would benefit from: skilled physical therapy  Planned modality interventions: thermotherapy: hydrocollator packs  Planned therapy interventions: manual therapy, neuromuscular re-education, therapeutic activities and therapeutic exercise  Frequency: 1x week  Duration in weeks: 4  Treatment plan discussed with: patient        Subjective Evaluation    History of Present Illness  Date of onset: 2023  Mechanism of injury: Pt reports gradual onset of progressively worsening vertigo symptoms. Pt reports her last sxs onset was when she was laying down onto L side.    Quality of life: fair    Patient Goals  Patient goals for therapy: independence with ADLs/IADLs, return to sport/leisure activities and improved balance  Patient goal: abolish vertigo   Pain  Progression: worsening      Improved     Treatments  Previous treatment: medication        Objective     Concurrent Complaints  Positive for nausea/motion sickness. Negative for headaches and tinnitus   abolished     Active Range of Motion   Cervical/Thoracic Spine       Cervical    Flexion: 45 degrees   Extension: 35 degrees      Left lateral flexion: 20 degrees      Right lateral flexion: 20 degrees      Left rotation: 50 degrees  Right rotation: 35 degrees       50 degrees           Neuro Exam:     Dizziness  Positive for vertigo and light-headedness. Negative   Negative for diplopia and floating or swimming. Exacerbating factors  Positive for rolling in bed and supine to/from sitting. Negative   Negative for bending over, looking up, walking and turning head. Symptoms   Intensity at best: 2/10  Intensity at worst: 10/10       2/10  Average intensity: 5/10      Headaches   Patient reports headaches: No.     Cervical exam   Ligament Laxity Testing   Alar ligament: WNL  Sharp Miguel A:  WNL  Modified VBI   Left: symptomatic  Right: symptomatic  Cervical palpation: Suboccipital TTP  Seated posture: forward head posture    Oculomotor exam   Oculomotor ROM: WNL  Resting nystagmus: not present   Smooth pursuits: saccadic smooth pursuit  Vertical saccades: hypermetric  Horizontal saccades: hypermetric  Convergence: abnormal  Cover test: normal  Dynamic visual acuity: normal    Positional testing   Liz-Hallpike   Left posterior canal: symptomatic  Duration: 4 (seconds)  Positional testing comment: Fukuda Test + R     NEGATIVE       Balance assessments   MCTSIB   Eyes open level surface: 5 sec  Eyes open foam surface: 5 sec  Eyes closed level surface: 5 sec  Eyes closed foam surface: 5 sec             Precautions: Vertigo      Manuals 9/13            Canalith repositioning maneuvers             SOR NS            STM cervical paraspinal mm NS            Cervical PROM NS            Neuro Re-Ed             Chin tuck iso 5x10" ea            Mid row iso             Shld ext iso             VOR x1             VOR x2             Tandem stance             SLS             Ther Ex                                                                                                                     Ther Activity                                       Gait Training                                       Modalities

## 2023-11-09 ENCOUNTER — OFFICE VISIT (OUTPATIENT)
Dept: PODIATRY | Facility: CLINIC | Age: 68
End: 2023-11-09
Payer: COMMERCIAL

## 2023-11-09 VITALS
SYSTOLIC BLOOD PRESSURE: 173 MMHG | BODY MASS INDEX: 33.12 KG/M2 | HEIGHT: 68 IN | DIASTOLIC BLOOD PRESSURE: 86 MMHG | HEART RATE: 66 BPM

## 2023-11-09 DIAGNOSIS — E11.40 TYPE 2 DIABETES MELLITUS WITH DIABETIC NEUROPATHY, WITHOUT LONG-TERM CURRENT USE OF INSULIN (HCC): ICD-10-CM

## 2023-11-09 DIAGNOSIS — L84 CORNS AND CALLUS: ICD-10-CM

## 2023-11-09 DIAGNOSIS — B35.1 ONYCHOMYCOSIS: Primary | ICD-10-CM

## 2023-11-09 PROCEDURE — 11720 DEBRIDE NAIL 1-5: CPT | Performed by: PODIATRIST

## 2023-11-09 PROCEDURE — 11056 PARNG/CUTG B9 HYPRKR LES 2-4: CPT | Performed by: PODIATRIST

## 2023-11-09 PROCEDURE — 11719 TRIM NAIL(S) ANY NUMBER: CPT | Performed by: PODIATRIST

## 2024-01-16 ENCOUNTER — APPOINTMENT (OUTPATIENT)
Dept: RADIOLOGY | Facility: CLINIC | Age: 69
End: 2024-01-16
Payer: COMMERCIAL

## 2024-01-16 ENCOUNTER — OFFICE VISIT (OUTPATIENT)
Dept: OBGYN CLINIC | Facility: CLINIC | Age: 69
End: 2024-01-16
Payer: COMMERCIAL

## 2024-01-16 ENCOUNTER — TELEPHONE (OUTPATIENT)
Dept: ENDOCRINOLOGY | Facility: HOSPITAL | Age: 69
End: 2024-01-16

## 2024-01-16 VITALS — HEIGHT: 68 IN | DIASTOLIC BLOOD PRESSURE: 82 MMHG | SYSTOLIC BLOOD PRESSURE: 144 MMHG | BODY MASS INDEX: 33.12 KG/M2

## 2024-01-16 DIAGNOSIS — M17.0 BILATERAL PRIMARY OSTEOARTHRITIS OF KNEE: Primary | ICD-10-CM

## 2024-01-16 DIAGNOSIS — M25.561 ACUTE PAIN OF BOTH KNEES: ICD-10-CM

## 2024-01-16 DIAGNOSIS — M25.562 ACUTE PAIN OF BOTH KNEES: ICD-10-CM

## 2024-01-16 PROCEDURE — 73564 X-RAY EXAM KNEE 4 OR MORE: CPT

## 2024-01-16 PROCEDURE — 99213 OFFICE O/P EST LOW 20 MIN: CPT | Performed by: ORTHOPAEDIC SURGERY

## 2024-01-16 PROCEDURE — 20610 DRAIN/INJ JOINT/BURSA W/O US: CPT | Performed by: ORTHOPAEDIC SURGERY

## 2024-01-16 RX ORDER — LIDOCAINE HYDROCHLORIDE 10 MG/ML
7 INJECTION, SOLUTION INFILTRATION; PERINEURAL
Status: COMPLETED | OUTPATIENT
Start: 2024-01-16 | End: 2024-01-16

## 2024-01-16 RX ORDER — BETAMETHASONE SODIUM PHOSPHATE AND BETAMETHASONE ACETATE 3; 3 MG/ML; MG/ML
6 INJECTION, SUSPENSION INTRA-ARTICULAR; INTRALESIONAL; INTRAMUSCULAR; SOFT TISSUE
Status: COMPLETED | OUTPATIENT
Start: 2024-01-16 | End: 2024-01-16

## 2024-01-16 RX ADMIN — BETAMETHASONE SODIUM PHOSPHATE AND BETAMETHASONE ACETATE 6 MG: 3; 3 INJECTION, SUSPENSION INTRA-ARTICULAR; INTRALESIONAL; INTRAMUSCULAR; SOFT TISSUE at 08:30

## 2024-01-16 RX ADMIN — LIDOCAINE HYDROCHLORIDE 7 ML: 10 INJECTION, SOLUTION INFILTRATION; PERINEURAL at 08:30

## 2024-01-16 NOTE — ASSESSMENT & PLAN NOTE
Findings consistent with bilateral knee osteoarthritis, right more symptomatic than left. Findings and treatment options were discussed with the patient. X-rays were reviewed with her. Discussed treatment options including low impact exercises such as stationary bicycle or swimming, topical NSAIDs and activity modification of no kneeling, squatting or repetitive climbing.  Recommend cortisone injections to the bilateral knee joints today to reduce inflammation and pain.  She tolerated the procedures well.  Advised to apply cold compress today.  Discussed that if cortisone injections do not give relief, we can try joint supplement injection series.  Recommend following up with the rheumatologist to discuss positive ENMA results.  Follow-up as needed if symptoms return.  Advised patient as soon as she can have another cortisone injection is in 3 to 4 months.  All patient's questions were answered to her satisfaction.  This note is created using dictation transcription.  It may contain typographical errors, grammatical errors, improperly dictated words, background noise and other errors.

## 2024-01-16 NOTE — PROGRESS NOTES
Assessment:     1. Bilateral primary osteoarthritis of knee        Plan:     Problem List Items Addressed This Visit          Musculoskeletal and Integument    Bilateral primary osteoarthritis of knee - Primary     Findings consistent with bilateral knee osteoarthritis, right more symptomatic than left. Findings and treatment options were discussed with the patient. X-rays were reviewed with her. Discussed treatment options including low impact exercises such as stationary bicycle or swimming, topical NSAIDs and activity modification of no kneeling, squatting or repetitive climbing.  Recommend cortisone injections to the bilateral knee joints today to reduce inflammation and pain.  She tolerated the procedures well.  Advised to apply cold compress today.  Discussed that if cortisone injections do not give relief, we can try joint supplement injection series.  Recommend following up with the rheumatologist to discuss positive ENMA results.  Follow-up as needed if symptoms return.  Advised patient as soon as she can have another cortisone injection is in 3 to 4 months.  All patient's questions were answered to her satisfaction.  This note is created using dictation transcription.  It may contain typographical errors, grammatical errors, improperly dictated words, background noise and other errors.         Relevant Medications    lidocaine (XYLOCAINE) 1 % injection 7 mL (Completed)    lidocaine (XYLOCAINE) 1 % injection 7 mL (Completed)    betamethasone acetate-betamethasone sodium phosphate (CELESTONE) injection 6 mg (Completed)    betamethasone acetate-betamethasone sodium phosphate (CELESTONE) injection 6 mg (Completed)    Other Relevant Orders    XR knee 4+ vw left injury (Completed)    XR knee 4+ vw right injury (Completed)    Large joint arthrocentesis: bilateral knee (Completed)      Subjective:     Patient ID: Tyra Edward is a 68 y.o. female.  Chief Complaint:  This is a 68-year-old white female here  for evaluation of her bilateral knees.  Right knee is more symptomatic than the left.  Patient referred here by her PCP Dr. Brody.  The right knee has been bothering her for about 3 years.  The left knee has been bothering her for the past few months.  She denies any major injury to either knee.  Pain was gradual in onset and has progressively gotten worse.  Pain is worse with kneeling, squatting and stair climbing.  Pain is localized anteriorly and can radiate posteriorly at times.  She denies any locking, catching or giving way.  She does feel a clunking sensation on the right with range of motion at times.  Pain is intermittent.  Pain is not as severe today.  She states last week her right knee was more swollen.  She takes Tylenol daily for pain.  She is unable to take NSAIDs due to gastric issues.  No other treatment.  She does have a history of having a positive ENMA.  She was seen by rheumatologist  did not recommend any treatment.  She is waiting for her appointment with another rheumatologist to discuss those results.  Patient intake form reviewed.    Allergy:  Allergies   Allergen Reactions    Dilaudid [Hydromorphone Hcl] Anaphylaxis    Hydromorphone Anaphylaxis     Other reaction(s): flushing, SOB    Metronidazole Anaphylaxis and Rash     Other reaction(s): nausea    Morphine Anaphylaxis, Rash and GI Intolerance     Other reaction(s): hives, flushing    Esomeprazole Other (See Comments)    Hydralazine Other (See Comments)     Cannot remember reation  Other reaction(s): vomiting, sweating, chills, itching    Metformin Other (See Comments)    Prednisone Other (See Comments)    Simvastatin Other (See Comments)     Other reaction(s): myalgias    Sulfamethoxazole-Trimethoprim Other (See Comments)     Other reaction(s): cannot recall    Valsartan GI Intolerance     Difficulty, swallowing, breathing  Other reaction(s): epigastric pain/GERD    Amlodipine Rash     Other reaction(s): ankle swelling     Ciprofloxacin GI Intolerance     Other reaction(s): unknown    Clindamycin Other (See Comments)     C diff  Other reaction(s): c-diff infection    Dexlansoprazole Other (See Comments)     Unknown per pt  Other reaction(s): shortness of breath    Metformin Hydrochloride-Repaglinide [Repaglinide-Metformin Hcl] Other (See Comments)     Unknown per pt      Nexium [Esomeprazole Magnesium] Diarrhea and GI Intolerance    Pantoprazole Diarrhea and GI Intolerance     Other reaction(s): allergy to generic only     Medications:  all current active meds have been reviewed  Past Medical History:  Past Medical History:   Diagnosis Date    Anemia     As a child    Anxiety     Arthritis 2016    Atrial fibrillation (HCC) 2016    Diabetes mellitus (HCC)     Difficulty walking     Diverticulitis of colon     Fatty liver     GERD (gastroesophageal reflux disease)     Hypertension 2016    Hypertensive urgency 2016    Hypokalemia 2016    Hypomagnesemia 2016    Nephrolithiasis     Neuropathy in diabetes (HCC)     Ongoing symptomatic disease due to COVID-19 virus     Plantar fasciitis     PTSD (post-traumatic stress disorder)     Thyroid nodule      Past Surgical History:  Past Surgical History:   Procedure Laterality Date    AV NODE ABLATION      CARDIAC SURGERY  2018    Ablation     SECTION      x3    CHOLECYSTECTOMY      COLONOSCOPY  2003    ENDOMETRIAL ABLATION      KIDNEY STONE SURGERY      OOPHORECTOMY Left     pt not sure but thinks     UPPER GASTROINTESTINAL ENDOSCOPY       Family History:  Family History   Problem Relation Age of Onset    Ovarian cancer Mother 45    Heart disease Mother     Heart attack Father     Heart disease Father     Diabetes type II Sister     Ovarian cancer Sister 62    Heart disease Sister     Diabetes unspecified Brother         prediabetes    Hypertension Brother     Obesity Brother     No Known Problems Maternal Aunt     Diabetes type II Maternal Aunt     No  "Known Problems Maternal Aunt     Diabetes type II Maternal Aunt     No Known Problems Paternal Aunt     No Known Problems Paternal Aunt     No Known Problems Maternal Grandmother     No Known Problems Maternal Grandfather     No Known Problems Paternal Grandmother     No Known Problems Paternal Grandfather     ALONZO disease Daughter     No Known Problems Daughter     ALONZO disease Son     Pancreatic cancer Cousin         30's    Colon cancer Neg Hx     Colon polyps Neg Hx      Social History:  Social History     Substance and Sexual Activity   Alcohol Use Never     Social History     Substance and Sexual Activity   Drug Use Never     Social History     Tobacco Use   Smoking Status Never   Smokeless Tobacco Never     Review of Systems   Constitutional: Negative.    HENT: Negative.     Eyes: Negative.    Respiratory: Negative.     Cardiovascular: Negative.    Gastrointestinal: Negative.    Endocrine: Negative.    Genitourinary: Negative.    Musculoskeletal:  Positive for arthralgias (bilateral knees), gait problem (antalgic) and joint swelling (right knee).   Skin: Negative.    Allergic/Immunologic: Negative.    Hematological: Negative.    Psychiatric/Behavioral: Negative.           Objective:  BP Readings from Last 1 Encounters:   01/16/24 144/82      Wt Readings from Last 1 Encounters:   08/07/23 98 kg (216 lb)      BMI:   Estimated body mass index is 33.12 kg/m² as calculated from the following:    Height as of this encounter: 5' 8\" (1.727 m).    Weight as of 8/7/23: 98.8 kg (217 lb 12.8 oz).  BSA:   Estimated body surface area is 2.12 meters squared as calculated from the following:    Height as of this encounter: 5' 8\" (1.727 m).    Weight as of 8/7/23: 98.8 kg (217 lb 12.8 oz).   Physical Exam  Constitutional:       General: She is not in acute distress.     Appearance: She is well-developed.   HENT:      Head: Normocephalic.   Eyes:      Conjunctiva/sclera: Conjunctivae normal.      Pupils: Pupils are equal, round, " and reactive to light.   Pulmonary:      Effort: Pulmonary effort is normal. No respiratory distress.   Musculoskeletal:      Right knee: No effusion.      Instability Tests: Medial Karen test negative and lateral Karen test negative.      Left knee: No effusion.      Instability Tests: Medial Karen test negative and lateral Karen test negative.   Skin:     General: Skin is warm and dry.   Neurological:      Mental Status: She is alert and oriented to person, place, and time.   Psychiatric:         Behavior: Behavior normal.       Right Knee Exam     Tenderness   The patient is experiencing tenderness in the medial joint line (diffuse anteriorly).    Range of Motion   Extension:  normal   Flexion:  120     Tests   Karen:  Medial - negative Lateral - negative  Varus: negative Valgus: negative  Drawer:  Anterior - negative    Posterior - negative    Other   Erythema: absent  Scars: absent  Sensation: normal  Pulse: present  Swelling: mild  Effusion: no effusion present    Comments:  Varicose veins over anterior knee      Left Knee Exam     Tenderness   Left knee tenderness location: diffuse anteriorly.    Range of Motion   The patient has normal left knee ROM.    Tests   Karen:  Medial - negative Lateral - negative  Varus: negative Valgus: negative  Drawer:  Anterior - negative     Posterior - negative    Other   Erythema: absent  Scars: absent  Sensation: normal  Pulse: present  Swelling: none  Effusion: no effusion present    Comments:  Varicose and spider veins in distal lower leg            I have personally reviewed pertinent films in PACS and my interpretation is x-rays of bilateral knees reveal moderate medial joint line narrowing with marginal osteophytes in the patellofemoral joint.    Large joint arthrocentesis: bilateral knee  Universal Protocol:  Consent: Verbal consent obtained.  Risks and benefits: risks, benefits and alternatives were discussed  Consent given by: patient  Patient  understanding: patient states understanding of the procedure being performed  Supporting Documentation  Indications: pain   Procedure Details  Location: knee - bilateral knee  Preparation: Patient was prepped and draped in the usual sterile fashion  Needle size: 22 G  Ultrasound guidance: no  Approach: anterolateral    Medications (Right): 7 mL lidocaine 1 %; 6 mg betamethasone acetate-betamethasone sodium phosphate 6 (3-3) mg/mLMedications (Left): 7 mL lidocaine 1 %; 6 mg betamethasone acetate-betamethasone sodium phosphate 6 (3-3) mg/mL   Patient tolerance: patient tolerated the procedure well with no immediate complications  Dressing:  Sterile dressing applied           Scribe Attestation      I,:  Adela Robles PA-C am acting as a scribe while in the presence of the attending physician.:       I,:  Pérez Banuelos MD personally performed the services described in this documentation    as scribed in my presence.:

## 2024-01-16 NOTE — TELEPHONE ENCOUNTER
Patient called, received 2 cortisone injections today and glucose is high at 392, patient reports she is normally .   Also reports she is short of breath, I did ask her to call the PCP about this issue.  Please advise.

## 2024-02-05 NOTE — PROGRESS NOTES
PATIENT:  Tyra Edward  1955    ASSESSMENT:  1. Onychomycosis        2. Corns and callus        3. Type 2 diabetes mellitus with diabetic neuropathy, without long-term current use of insulin ([facility])  Nail removal            Orders Placed This Encounter   Procedures   • Nail removal          PLAN:  Disease prevention and related risk factors of diabetes were identified and discussed.    The patient was educated in proper foot wear for diabetics.    Educated in daily foot assessment and routine diabetic foot care.    Discussed the importance of controlling BS through diet and exercise.    The patient will follow up in 9-12 weeks for further diabetic foot exam and care.      Procedures: 54740, 52082, 55466  All mycotic toenails were reduced and debrided in length, width, and girth using a nail nipper and electric dremel.    All hyperkeratotic skin lesion(s) if present were sharply pared with a #10 scalpel with no evidence of ulceration/abscess.    Patient tolerated procedure(s) well without complications.    Nail removal    Date/Time: 11/9/2023 1:45 PM    Performed by: Shane Laurent DPM  Authorized by: Shane Laurent DPM    Patient location:  Other (comment)Universal Protocol:  Consent: Verbal consent obtained.  Risks and benefits: risks, benefits and alternatives were discussed  Consent given by: patient  Patient understanding: patient states understanding of the procedure being performed  Patient identity confirmed: verbally with patient    Nails trimmed:     Number of nails trimmed:  8  Post-procedure details:     Patient tolerance of procedure:  Tolerated well, no immediate complications       HPI:  Tyra Edward is a 68 y.o.year old female seen for diabetic foot exam.  Patient has class findings with type II DM.  BS is under control.  Patient concerned of thick toenails, painful lesions, and numb tingling sensations in both feet.  The patient denied any acute pedal disorder, redness,  "acute swelling, or recent injury.      The following portions of the patient's history were reviewed and updated as appropriate: allergies, current medications, past family history, past medical history, past social history, past surgical history, and problem list.    REVIEW OF SYSTEMS:  GENERAL: No fever or chills  HEART: No chest pain, or palpitation  RESPIRATORY:  No acute SOB or cough  GI: No Nausea, vomit or diarrhea  NEUROLOGIC: No syncope or acute weakness    PHYSICAL EXAM:    BP (!) 173/86 (BP Location: Left arm, Patient Position: Sitting, Cuff Size: Adult)   Pulse 66   Ht 5' 8\" (1.727 m) Comment: verbal  LMP  (LMP Unknown)   BMI 33.12 kg/m²     VASCULAR EXAM:  Posterior tibial artery absent bilateral  Dorsalis pedis artery +1 bilateral  The patient has moderate skin atrophy, color changes, lack of digital hair, and nail dystrophy.    There is no lower extremity edema bilaterally.      NEUROLOGIC EXAM:  Sensation is intact to light touch. No   Sensation is absent to 10gm monofilament.    Vibratory sensation none.     Tingling numb paresthesias noted bilateral.         DERMATOLOGIC EXAM:   Texture, Tone and Turgor are diminished bilateral.    The patient has dystrophic/hypertrophic toenails with yellow/white discoloration, onycholysis, and subungal debris.   Fungal odor noted.  Brittle nature noted.  Right foot nails dystrophic x 1 with 0.5 cm ave thickness (#5)  Left foot nails dystrophic x 1 with 0.5 cm ave thickness (#5)    Patient has hyperkeratotic lesions noted:  Right foot located at subfifth MPJ.  Left foot located at subfifth MPJ and left lateral heel  No notable suspicious skin lesions.      MUSCULOSKELETAL EXAM:   No acute joint pain, edema, or redness.  Denies any acute musculoskeletal problem.    Patient has no gross pedal deformities. Patient has no ambulation limitations.      Patient wears DM shoes? No    Risk Category/Class Findings:  Q8(B1, B2 ABC)  1 = Loss of protective " sensation    A1)  Has the patient had a previous amputation of the foot or integral skeletal portion thereof? No  B1)  Does the patient have absent posterior tibial pulse? Yes  B3)  Does the patient have absent dorsalis pedis? No  B2)  Does the patient have three of the following? Yes           1.  Hair growth (increased or decreased), 2.  Nail changes (thickening), and 3.  Pigmentary changes (discoloring)  C)  Does the patient have two of the following and one above? Yes            4.  Paraesthesias (abnormal spontaneous sensations in the feet) and 5.  Burning

## 2024-02-09 ENCOUNTER — OFFICE VISIT (OUTPATIENT)
Dept: PODIATRY | Facility: CLINIC | Age: 69
End: 2024-02-09
Payer: COMMERCIAL

## 2024-02-09 VITALS — SYSTOLIC BLOOD PRESSURE: 144 MMHG | BODY MASS INDEX: 33.12 KG/M2 | DIASTOLIC BLOOD PRESSURE: 80 MMHG | HEIGHT: 68 IN

## 2024-02-09 DIAGNOSIS — B35.1 ONYCHOMYCOSIS: Primary | ICD-10-CM

## 2024-02-09 DIAGNOSIS — L84 CORNS AND CALLUS: ICD-10-CM

## 2024-02-09 DIAGNOSIS — E11.40 TYPE 2 DIABETES MELLITUS WITH DIABETIC NEUROPATHY, WITHOUT LONG-TERM CURRENT USE OF INSULIN (HCC): ICD-10-CM

## 2024-02-09 PROCEDURE — 11056 PARNG/CUTG B9 HYPRKR LES 2-4: CPT | Performed by: PODIATRIST

## 2024-02-09 PROCEDURE — 11720 DEBRIDE NAIL 1-5: CPT | Performed by: PODIATRIST

## 2024-02-09 PROCEDURE — 11719 TRIM NAIL(S) ANY NUMBER: CPT | Performed by: PODIATRIST

## 2024-02-09 NOTE — PROGRESS NOTES
PATIENT:  Tyra Edward  1955    ASSESSMENT:  1. Onychomycosis        2. Corns and callus        3. Type 2 diabetes mellitus with diabetic neuropathy, without long-term current use of insulin ([facility])  Nail removal            Orders Placed This Encounter   Procedures   • Nail removal            PLAN:  Disease prevention and related risk factors of diabetes were identified and discussed.    The patient was educated in proper foot wear for diabetics.    Educated in daily foot assessment and routine diabetic foot care.    Discussed the importance of controlling BS through diet and exercise.    The patient will follow up in 9-12 weeks for further diabetic foot exam and care.      Procedures: 84840, 15558, 70624  All mycotic toenails were reduced and debrided in length, width, and girth using a nail nipper and electric dremel.    All hyperkeratotic skin lesion(s) if present were sharply pared with a #10 scalpel with no evidence of ulceration/abscess.    Patient tolerated procedure(s) well without complications.    Nail removal    Date/Time: 2/9/2024 8:15 AM    Performed by: Shane Laurent DPM  Authorized by: Shane Laurent DPM    Patient location:  Other (comment)Universal Protocol:  Consent: Verbal consent obtained.  Risks and benefits: risks, benefits and alternatives were discussed  Consent given by: patient  Patient understanding: patient states understanding of the procedure being performed  Patient identity confirmed: verbally with patient    Nails trimmed:     Number of nails trimmed:  8  Post-procedure details:     Patient tolerance of procedure:  Tolerated well, no immediate complications       HPI:  Tyra Edward is a 68 y.o.year old female seen for diabetic foot exam.  Patient has class findings with type II DM.  BS is under control.  Patient concerned of thick toenails, painful lesions, and numb tingling sensations in both feet.  The patient denied any acute pedal disorder, redness,  "acute swelling, or recent injury.  Overall clinically unchanged from prior visit.    The following portions of the patient's history were reviewed and updated as appropriate: allergies, current medications, past family history, past medical history, past social history, past surgical history, and problem list.    REVIEW OF SYSTEMS:  GENERAL: No fever or chills  HEART: No chest pain, or palpitation  RESPIRATORY:  No acute SOB or cough  GI: No Nausea, vomit or diarrhea  NEUROLOGIC: No syncope or acute weakness    PHYSICAL EXAM:    /80 (BP Location: Left arm, Patient Position: Sitting, Cuff Size: Adult)   Ht 5' 8\" (1.727 m)   LMP  (LMP Unknown)   BMI 33.12 kg/m²     VASCULAR EXAM:  Posterior tibial artery absent bilateral  Dorsalis pedis artery +1 bilateral  The patient has moderate skin atrophy, color changes, lack of digital hair, and nail dystrophy.    There is no lower extremity edema bilaterally.      NEUROLOGIC EXAM:  Sensation is intact to light touch. No   Sensation is absent to 10gm monofilament.    Vibratory sensation none.     Tingling numb paresthesias noted bilateral.         DERMATOLOGIC EXAM:   Texture, Tone and Turgor are diminished bilateral.    The patient has dystrophic/hypertrophic toenails with yellow/white discoloration, onycholysis, and subungal debris.   Fungal odor noted.  Brittle nature noted.  Right foot nails dystrophic x 1 with 0.5 cm ave thickness (#5)  Left foot nails dystrophic x 1 with 0.5 cm ave thickness (#5)    Patient has hyperkeratotic lesions noted:  Right foot located at subfifth MPJ.  Left foot located at subfifth MPJ and left lateral heel  No notable suspicious skin lesions.      MUSCULOSKELETAL EXAM:   No acute joint pain, edema, or redness.  Denies any acute musculoskeletal problem.    Patient has no gross pedal deformities. Patient has no ambulation limitations.      Patient wears DM shoes? No    Risk Category/Class Findings:  Q8(B1, B2 ABC)  1 = Loss of protective " sensation    A1)  Has the patient had a previous amputation of the foot or integral skeletal portion thereof? No  B1)  Does the patient have absent posterior tibial pulse? Yes  B3)  Does the patient have absent dorsalis pedis? No  B2)  Does the patient have three of the following? Yes           1.  Hair growth (increased or decreased), 2.  Nail changes (thickening), and 3.  Pigmentary changes (discoloring)  C)  Does the patient have two of the following and one above? Yes            4.  Paraesthesias (abnormal spontaneous sensations in the feet) and 5.  Burning

## 2024-02-13 LAB
ALBUMIN SERPL-MCNC: 4 G/DL (ref 3.9–4.9)
ALBUMIN/CREAT UR: 12 MG/G CREAT (ref 0–29)
ALBUMIN/GLOB SERPL: 1.5 {RATIO} (ref 1.2–2.2)
ALP SERPL-CCNC: 104 IU/L (ref 44–121)
ALT SERPL-CCNC: 15 IU/L (ref 0–32)
AST SERPL-CCNC: 16 IU/L (ref 0–40)
BASOPHILS # BLD AUTO: 0 X10E3/UL (ref 0–0.2)
BASOPHILS NFR BLD AUTO: 1 %
BILIRUB SERPL-MCNC: 0.3 MG/DL (ref 0–1.2)
BUN SERPL-MCNC: 16 MG/DL (ref 8–27)
BUN/CREAT SERPL: 18 (ref 12–28)
CALCIUM SERPL-MCNC: 9.3 MG/DL (ref 8.7–10.3)
CHLORIDE SERPL-SCNC: 103 MMOL/L (ref 96–106)
CHOLEST SERPL-MCNC: 181 MG/DL (ref 100–199)
CO2 SERPL-SCNC: 24 MMOL/L (ref 20–29)
CREAT SERPL-MCNC: 0.87 MG/DL (ref 0.57–1)
CREAT UR-MCNC: 108.6 MG/DL
EGFR: 72 ML/MIN/1.73
EOSINOPHIL # BLD AUTO: 0.1 X10E3/UL (ref 0–0.4)
EOSINOPHIL NFR BLD AUTO: 2 %
ERYTHROCYTE [DISTWIDTH] IN BLOOD BY AUTOMATED COUNT: 11.6 % (ref 11.7–15.4)
EST. AVERAGE GLUCOSE BLD GHB EST-MCNC: 146 MG/DL
GLOBULIN SER-MCNC: 2.7 G/DL (ref 1.5–4.5)
GLUCOSE SERPL-MCNC: 138 MG/DL (ref 70–99)
HBA1C MFR BLD: 6.7 % (ref 4.8–5.6)
HCT VFR BLD AUTO: 44.6 % (ref 34–46.6)
HDLC SERPL-MCNC: 58 MG/DL
HGB BLD-MCNC: 14.4 G/DL (ref 11.1–15.9)
IMM GRANULOCYTES # BLD: 0 X10E3/UL (ref 0–0.1)
IMM GRANULOCYTES NFR BLD: 0 %
LDLC SERPL CALC-MCNC: 103 MG/DL (ref 0–99)
LDLC/HDLC SERPL: 1.8 RATIO (ref 0–3.2)
LYMPHOCYTES # BLD AUTO: 1.6 X10E3/UL (ref 0.7–3.1)
LYMPHOCYTES NFR BLD AUTO: 25 %
MCH RBC QN AUTO: 30.6 PG (ref 26.6–33)
MCHC RBC AUTO-ENTMCNC: 32.3 G/DL (ref 31.5–35.7)
MCV RBC AUTO: 95 FL (ref 79–97)
MICROALBUMIN UR-MCNC: 13 UG/ML
MONOCYTES # BLD AUTO: 0.7 X10E3/UL (ref 0.1–0.9)
MONOCYTES NFR BLD AUTO: 10 %
NEUTROPHILS # BLD AUTO: 4.1 X10E3/UL (ref 1.4–7)
NEUTROPHILS NFR BLD AUTO: 62 %
PLATELET # BLD AUTO: 239 X10E3/UL (ref 150–450)
POTASSIUM SERPL-SCNC: 3.9 MMOL/L (ref 3.5–5.2)
PROT SERPL-MCNC: 6.7 G/DL (ref 6–8.5)
RBC # BLD AUTO: 4.71 X10E6/UL (ref 3.77–5.28)
SL AMB VLDL CHOLESTEROL CALC: 20 MG/DL (ref 5–40)
SODIUM SERPL-SCNC: 142 MMOL/L (ref 134–144)
T4 FREE SERPL-MCNC: 1.13 NG/DL (ref 0.82–1.77)
THYROGLOB AB SERPL-ACNC: <1 IU/ML (ref 0–0.9)
THYROPEROXIDASE AB SERPL-ACNC: <9 IU/ML (ref 0–34)
TRIGL SERPL-MCNC: 115 MG/DL (ref 0–149)
TSH SERPL DL<=0.005 MIU/L-ACNC: 3.71 UIU/ML (ref 0.45–4.5)
WBC # BLD AUTO: 6.5 X10E3/UL (ref 3.4–10.8)

## 2024-02-23 ENCOUNTER — OFFICE VISIT (OUTPATIENT)
Dept: ENDOCRINOLOGY | Facility: HOSPITAL | Age: 69
End: 2024-02-23
Payer: COMMERCIAL

## 2024-02-23 VITALS
SYSTOLIC BLOOD PRESSURE: 128 MMHG | WEIGHT: 219 LBS | BODY MASS INDEX: 33.19 KG/M2 | HEART RATE: 64 BPM | OXYGEN SATURATION: 99 % | DIASTOLIC BLOOD PRESSURE: 70 MMHG | HEIGHT: 68 IN

## 2024-02-23 DIAGNOSIS — I10 PRIMARY HYPERTENSION: Chronic | ICD-10-CM

## 2024-02-23 DIAGNOSIS — E11.9 TYPE 2 DIABETES MELLITUS WITHOUT COMPLICATION, WITHOUT LONG-TERM CURRENT USE OF INSULIN (HCC): Primary | ICD-10-CM

## 2024-02-23 DIAGNOSIS — E04.2 GOITER, NONTOXIC, MULTINODULAR: ICD-10-CM

## 2024-02-23 DIAGNOSIS — E03.8 SUBCLINICAL HYPOTHYROIDISM: ICD-10-CM

## 2024-02-23 DIAGNOSIS — E11.42 DIABETIC POLYNEUROPATHY ASSOCIATED WITH TYPE 2 DIABETES MELLITUS (HCC): ICD-10-CM

## 2024-02-23 DIAGNOSIS — E55.9 VITAMIN D DEFICIENCY: ICD-10-CM

## 2024-02-23 PROCEDURE — 99214 OFFICE O/P EST MOD 30 MIN: CPT | Performed by: INTERNAL MEDICINE

## 2024-02-23 NOTE — PROGRESS NOTES
2/23/2024    Assessment/Plan     1. Type 2 diabetes mellitus without complication, without long-term current use of insulin (HCC)  -     Comprehensive metabolic panel; Future; Expected date: 07/08/2024  -     Hemoglobin A1C; Future; Expected date: 07/08/2024  -     TSH, 3rd generation; Future; Expected date: 07/08/2024  -     T4, free; Future; Expected date: 07/08/2024  -     Lipid Panel with Direct LDL reflex; Future; Expected date: 07/08/2024  -     Comprehensive metabolic panel  -     Hemoglobin A1C  -     TSH, 3rd generation  -     T4, free  -     Lipid Panel with Direct LDL reflex    2. Diabetic polyneuropathy associated with type 2 diabetes mellitus (HCC)  -     Comprehensive metabolic panel; Future; Expected date: 07/08/2024  -     Hemoglobin A1C; Future; Expected date: 07/08/2024  -     TSH, 3rd generation; Future; Expected date: 07/08/2024  -     T4, free; Future; Expected date: 07/08/2024  -     Lipid Panel with Direct LDL reflex; Future; Expected date: 07/08/2024  -     Comprehensive metabolic panel  -     Hemoglobin A1C  -     TSH, 3rd generation  -     T4, free  -     Lipid Panel with Direct LDL reflex    3. Goiter, nontoxic, multinodular  -     Comprehensive metabolic panel; Future; Expected date: 07/08/2024  -     Hemoglobin A1C; Future; Expected date: 07/08/2024  -     TSH, 3rd generation; Future; Expected date: 07/08/2024  -     T4, free; Future; Expected date: 07/08/2024  -     Lipid Panel with Direct LDL reflex; Future; Expected date: 07/08/2024  -     Comprehensive metabolic panel  -     Hemoglobin A1C  -     TSH, 3rd generation  -     T4, free  -     Lipid Panel with Direct LDL reflex    4. Subclinical hypothyroidism  -     Comprehensive metabolic panel; Future; Expected date: 07/08/2024  -     Hemoglobin A1C; Future; Expected date: 07/08/2024  -     TSH, 3rd generation; Future; Expected date: 07/08/2024  -     T4, free; Future; Expected date: 07/08/2024  -     Lipid Panel with Direct LDL  reflex; Future; Expected date: 07/08/2024  -     Comprehensive metabolic panel  -     Hemoglobin A1C  -     TSH, 3rd generation  -     T4, free  -     Lipid Panel with Direct LDL reflex    5. Primary hypertension  -     Comprehensive metabolic panel; Future; Expected date: 07/08/2024  -     Hemoglobin A1C; Future; Expected date: 07/08/2024  -     TSH, 3rd generation; Future; Expected date: 07/08/2024  -     T4, free; Future; Expected date: 07/08/2024  -     Lipid Panel with Direct LDL reflex; Future; Expected date: 07/08/2024  -     Comprehensive metabolic panel  -     Hemoglobin A1C  -     TSH, 3rd generation  -     T4, free  -     Lipid Panel with Direct LDL reflex    6. Vitamin D deficiency  -     Comprehensive metabolic panel; Future; Expected date: 07/08/2024  -     Hemoglobin A1C; Future; Expected date: 07/08/2024  -     TSH, 3rd generation; Future; Expected date: 07/08/2024  -     T4, free; Future; Expected date: 07/08/2024  -     Lipid Panel with Direct LDL reflex; Future; Expected date: 07/08/2024  -     Comprehensive metabolic panel  -     Hemoglobin A1C  -     TSH, 3rd generation  -     T4, free  -     Lipid Panel with Direct LDL reflex         1. Type 2 diabetes.  Most recent hemoglobin A1c of 6.7% demonstrates excellent control of her diabetes. At this point she will continue the same glipizide 2.5 mg once daily. She will continue to work on a lower carbohydrate diet and regular exercise. She will continue to test her blood glucose once or twice a day. I have asked her to follow up with the ophthalmologist as she is overdue for a diabetic eye exam. She does have a mildly elevated LDL cholesterol given she has diabetes as the LDL goal is around 70. She has had some issues in her 30s with myalgia for statins, although it is unclear what statins she used. She is also trying to go through an evaluation for rheumatologic issues and possible lupus, which has not been fully evaluated, and she is going to try  seeing another rheumatologist to get that evaluated. At this point, I will hold on to cholesterol lowering agents until her rheumatology situation is evaluated and treatment begun. I do not want to muddy the water so to speak by adding a lipid lowering agent given her LDL is not that high. We did discuss the possibility of trying low-dose pravastatin to see if that would be a better option causing less myalgias or even very low dose rosuvastatin 5 mg once a day or even 3 times a week just to get a statin on board for cardiac and vascular protection. This will be revisited with her next visit, and I will repeat her lipid profile with her next visit.    2. Diabetic neuropathy.  She does follow with podiatry on a regular basis. Diabetic foot exam was performed in the office today.    3. Subclinical hypothyroidism.  Most recent thyroid function studies are normal. She is biochemically euthyroid. Thyroid antibodies are negative, so she does not have underlying autoimmune thyroid disease.    4. Nontoxic multinodular goiter.  Her last ultrasound showed sub centimeter non-worrisome thyroid nodules. No repeat ultrasound needs to be performed unless her physical exam changes.    5. Vitamin D deficiency.  She will continue the same vitamin D 2000 units daily.    6. Hypertension.  She is normotensive in the office on her current dose of losartan and metoprolol.     I have asked her to follow up in 6 months with preceding hemoglobin A1c, CMP, lipid panel, TSH, and free T4.    CC: Diabetes 2, thyroid, blood pressure, vitamin D follow-up    History of Present Illness     HPI: Tyra Edward is a 68 y.o. year old female with type 2 diabetes with neuropathy for 5 years, hypertension, hyperlipidemia, subclinical hypothyroidism, multinodular goiter, and vitamin D deficiency for a follow-up visit.     Diabetic complications include neuropathy. She denies nephropathy, retinopathy, heart attack, stroke, or claudication.     She  notes that she had a nontoxic multinodular goiter on ultrasound in 2016 and 2018 showing sub centimeter thyroid nodules that did not need further follow-up. Her last thyroid ultrasound in 02/2023 showed the same sub centimeter thyroid nodules that were spongiform. She has a history of subclinical hypothyroidism with mildly elevated TSH but is on no thyroid hormone replacement at present.    She is still taking glipizide 2.5 mg a day. She went to see the orthopedist on 01/16/2024 and received a cortisone injection. She increased her medication dosage until symptoms subsided. She occasionally experiences polyuria and nocturia. Additionally, she also reports intermittent polydipsia and polyphagia. She is being vigilant about her blood glucose levels and intervenes as soon as she feels she is having hypoglycemia. She denies oral paresthesia.    Hypoglycemic episodes: YES, occasional hypoglycemic episodes    Blood Sugar/Glucometer/Pump/CGM review: She checks her blood glucose twice a day and mostly the range is in the 100s. Her average blood glucose is around 80 to160.  Before breakfast: YES. If she eats late at night, her blood glucose is a little higher in the morning. If she eats a regular time at 5:00, her blood glucose is lower in the morning.     She is taking 2000 units of vitamin D a day for vitamin deficiency.    She is on losartan 100 mg a day and metoprolol 100 mg twice a day. She denies experiencing headaches, lightheadedness, or weakness, but reports occasional dizziness. She infrequently feels as though she may faint, which she attributes to anxiety. After receiving a cortisone injection, she experienced chest tightness, dysphagia, and dyspnea, leading her to take an additional dose of losartan and lorazepam. She had elevated blood pressure, concurrently. Currently, she denies experiencing dyspnea or chest pain. She reports mild dysphagia, specifically with tablet medications, describing it as a sensation  of throat constriction despite no more nodules.    The patient's last eye exam was in . She denies experiencing any blurry vision.     The patient's last foot exam was in 2022. She suffers from foot paresthesia, which intensifies during winter. She met with Dr. Laurent in the previous month.    She was diagnosed with lupus in 2023. She has a lot of pain in various places.   She did see a rheumatologist, , who told her that she does not have rheumatoid arthritis. She is in the process of looking for another rheumatologist.        Last A1C was   Lab Results   Component Value Date    HGBA1C 6.7 (H) 2024   .      Review of Systems  The pertinent positive and negative findings are as noted in the HPI.    Historical Information   Past Medical History:   Diagnosis Date    Anemia     As a child    Anxiety     Arthritis 2016    Atrial fibrillation (HCC)     Diabetes mellitus (HCC)     Difficulty walking     Diverticulitis of colon     Fatty liver     GERD (gastroesophageal reflux disease)     Hypertension 2016    Hypertensive urgency 2016    Hypokalemia 2016    Hypomagnesemia 2016    Lupus (HCC) 2023    Nephrolithiasis     Neuropathy in diabetes (HCC)     Ongoing symptomatic disease due to COVID-19 virus     Plantar fasciitis     PTSD (post-traumatic stress disorder)     Thyroid nodule      Past Surgical History:   Procedure Laterality Date    AV NODE ABLATION      CARDIAC SURGERY  2018    Ablation     SECTION      x3    CHOLECYSTECTOMY      COLONOSCOPY  2003    ENDOMETRIAL ABLATION      KIDNEY STONE SURGERY      OOPHORECTOMY Left     pt not sure but thinks     UPPER GASTROINTESTINAL ENDOSCOPY       Social History   Social History     Substance and Sexual Activity   Alcohol Use Never     Social History     Substance and Sexual Activity   Drug Use Never     Social History     Tobacco Use   Smoking Status Never   Smokeless Tobacco Never     Family  History:   Family History   Problem Relation Age of Onset    Ovarian cancer Mother 45    Heart disease Mother     Heart attack Father     Heart disease Father     Diabetes type II Sister     Ovarian cancer Sister 62    Heart disease Sister     Diabetes unspecified Brother         prediabetes    Hypertension Brother     Obesity Brother     No Known Problems Maternal Aunt     Diabetes type II Maternal Aunt     No Known Problems Maternal Aunt     Diabetes type II Maternal Aunt     No Known Problems Paternal Aunt     No Known Problems Paternal Aunt     No Known Problems Maternal Grandmother     No Known Problems Maternal Grandfather     No Known Problems Paternal Grandmother     No Known Problems Paternal Grandfather     ALONZO disease Daughter     No Known Problems Daughter     ALONZO disease Son     Pancreatic cancer Cousin         30's    Colon cancer Neg Hx     Colon polyps Neg Hx        Meds/Allergies   Current Outpatient Medications   Medication Sig Dispense Refill    ALPRAZolam (XANAX) 0.25 mg tablet Take 1 tablet by mouth 2 (two) times a day as needed      dabigatran etexilate (PRADAXA) 150 mg capsu Take 150 mg by mouth 2 (two) times a day      famotidine (PEPCID) 40 MG tablet Take 40 mg by mouth 2 (two) times a day      GLIPIZIDE PO Take 2.5 mg by mouth daily Now being ordered as a 2.5 instead of a 5mg breaking in half.      losartan (COZAAR) 100 MG tablet Take 1 tablet (100 mg total) by mouth daily 90 tablet 3    metoprolol tartrate (LOPRESSOR) 50 mg tablet 100 mg 2 (two) times a day        Vitamin D, Cholecalciferol, 50 MCG (2000 UT) CAPS Take by mouth in the morning       No current facility-administered medications for this visit.     Allergies   Allergen Reactions    Dilaudid [Hydromorphone Hcl] Anaphylaxis    Hydromorphone Anaphylaxis     Other reaction(s): flushing, SOB    Metronidazole Anaphylaxis and Rash     Other reaction(s): nausea    Morphine Anaphylaxis, Rash and GI Intolerance     Other reaction(s):  "hives, flushing    Esomeprazole Other (See Comments)    Hydralazine Other (See Comments)     Cannot remember reation  Other reaction(s): vomiting, sweating, chills, itching    Metformin Other (See Comments)    Prednisone Other (See Comments)    Simvastatin Other (See Comments)     Other reaction(s): myalgias    Sulfamethoxazole-Trimethoprim Other (See Comments)     Other reaction(s): cannot recall    Valsartan GI Intolerance     Difficulty, swallowing, breathing  Other reaction(s): epigastric pain/GERD    Amlodipine Rash     Other reaction(s): ankle swelling    Ciprofloxacin GI Intolerance     Other reaction(s): unknown    Clindamycin Other (See Comments)     C diff  Other reaction(s): c-diff infection    Dexlansoprazole Other (See Comments)     Unknown per pt  Other reaction(s): shortness of breath    Metformin Hydrochloride-Repaglinide [Repaglinide-Metformin Hcl] Other (See Comments)     Unknown per pt      Nexium [Esomeprazole Magnesium] Diarrhea and GI Intolerance    Pantoprazole Diarrhea and GI Intolerance     Other reaction(s): allergy to generic only       Objective   Vitals: Blood pressure 128/70, pulse 64, height 5' 8\" (1.727 m), weight 99.3 kg (219 lb), SpO2 99%.  Invasive Devices       None                   Physical Exam  Cardiovascular:      Pulses: no weak pulses.           Dorsalis pedis pulses are 2+ on the right side and 2+ on the left side.        Posterior tibial pulses are 2+ on the right side and 2+ on the left side.   Feet:      Right foot:      Skin integrity: No ulcer, skin breakdown, erythema, warmth, callus or dry skin.      Left foot:      Skin integrity: No ulcer, skin breakdown, erythema, warmth, callus or dry skin.       Physical exam normal with pertinent positives and negatives.  Eyes: No lid lags, stare, proptosis, or periorbital edema.  Neck: Thyroid normal in size. No palpable thyroid nodules.  Respiratory: Lungs are clear.  Cardiovascular: Heart regular. No murmurs.  Diabetic foot " exam: Very slight fine tremor in the outstretched hand, left worse than right. Patellar and deep tendon reflexes normal. No lower extremity edema. Dorsalis pedis and posterior tibialis pulses 2+. No ulcerations in the feet. Vibration sensation diminished to the first toe DIP joint bilaterally. Monofilament sensation absent to left heel and right third and fifth toes but was otherwise intact.  Patient's shoes and socks removed.    Right Foot/Ankle   Right Foot Inspection  Skin Exam: skin normal and skin intact. No dry skin, no warmth, no callus, no erythema, no maceration, no abnormal color, no pre-ulcer, no ulcer and no callus.     Toe Exam: No swelling and  no right toe deformity    Sensory   Vibration: diminished  Monofilament testing: diminished    Vascular  Capillary refills: < 3 seconds  The right DP pulse is 2+. The right PT pulse is 2+.     Left Foot/Ankle  Left Foot Inspection  Skin Exam: skin normal and skin intact. No dry skin, no warmth, no erythema, no maceration, normal color, no pre-ulcer, no ulcer and no callus.     Toe Exam: No swelling and no left toe deformity.     Sensory   Vibration: diminished  Monofilament testing: diminished    Vascular  Capillary refills: < 3 seconds  The left DP pulse is 2+. The left PT pulse is 2+.     Assign Risk Category  No deformity present  Loss of protective sensation  No weak pulses  Risk: 1        The history was obtained from the review of the chart and from the patient.    Lab Results:    Most recent Alc is  Lab Results   Component Value Date    HGBA1C 6.7 (H) 02/12/2024           Lab Results   Component Value Date    CREATININE 0.87 02/12/2024    CREATININE 0.80 12/07/2022    CREATININE 0.89 06/09/2022    BUN 16 02/12/2024     12/11/2015    K 3.9 02/12/2024     02/12/2024    CO2 24 02/12/2024     eGFR   Date Value Ref Range Status   02/12/2024 72 >59 mL/min/1.73 Final   06/29/2018 54 ml/min/1.73sq m Final         Lab Results   Component Value Date     HDL 58 02/12/2024    TRIG 115 02/12/2024       Lab Results   Component Value Date    ALT 15 02/12/2024    AST 16 02/12/2024    ALKPHOS 155 (H) 06/29/2018       Lab Results   Component Value Date    TSH 3.710 02/12/2024    FREET4 1.13 02/12/2024       Blood work performed on 02/12/2024 showed a hemoglobin A1c of 6.7%.     CMP showed a glucose of 138 fasting but was otherwise normal.     CBC is normal.     Total cholesterol 181, triglyceride 115, HDL 58, .     TSH is 3.71 with a free T4 of 1.13. Thyroid peroxidase antibodies and thyroglobulin antibodies are negative.     Urine microalbumin to creatinine ratio is 12.        Future Appointments   Date Time Provider Department Center   5/10/2024  8:15 AM Shane Laurent DPM [facility]   8/28/2024  8:00 AM Juju Calvillo MD [facility]       Transcribed for Juju Calvillo MD, by Josue Malagon on 02/23/24 at 1:29 PM. Powered by [employer].

## 2024-02-23 NOTE — PATIENT INSTRUCTIONS
Hgba1c is 6.7%. this is excellent.     Continue the same glipizide for now.     Continue to check blood sugars 1-2 times a day.     Continue to work on lower carb diet and regular exercise as able.    The rest of the blood work is ok.     You should be on low dose cholesterol medicine even though cholesterol not bad. We can revisit this after the rheumatology issues are dealt with.     Dr. Bonita soriano is the rheumatologist near [place].     Follow up in 6 months with blood work.

## 2024-07-25 LAB
ALBUMIN SERPL-MCNC: 4 G/DL (ref 3.9–4.9)
ALP SERPL-CCNC: 106 IU/L (ref 44–121)
ALT SERPL-CCNC: 16 IU/L (ref 0–32)
AST SERPL-CCNC: 20 IU/L (ref 0–40)
BILIRUB SERPL-MCNC: 0.3 MG/DL (ref 0–1.2)
BUN SERPL-MCNC: 14 MG/DL (ref 8–27)
BUN/CREAT SERPL: 17 (ref 12–28)
CALCIUM SERPL-MCNC: 9.4 MG/DL (ref 8.7–10.3)
CHLORIDE SERPL-SCNC: 104 MMOL/L (ref 96–106)
CHOLEST SERPL-MCNC: 173 MG/DL (ref 100–199)
CO2 SERPL-SCNC: 25 MMOL/L (ref 20–29)
CREAT SERPL-MCNC: 0.84 MG/DL (ref 0.57–1)
EGFR: 75 ML/MIN/1.73
EST. AVERAGE GLUCOSE BLD GHB EST-MCNC: 151 MG/DL
GLOBULIN SER-MCNC: 2.6 G/DL (ref 1.5–4.5)
GLUCOSE SERPL-MCNC: 126 MG/DL (ref 70–99)
HBA1C MFR BLD: 6.9 % (ref 4.8–5.6)
HDLC SERPL-MCNC: 53 MG/DL
LDLC SERPL CALC-MCNC: 100 MG/DL (ref 0–99)
LDLC/HDLC SERPL: 1.9 RATIO (ref 0–3.2)
POTASSIUM SERPL-SCNC: 4.7 MMOL/L (ref 3.5–5.2)
PROT SERPL-MCNC: 6.6 G/DL (ref 6–8.5)
SL AMB VLDL CHOLESTEROL CALC: 20 MG/DL (ref 5–40)
SODIUM SERPL-SCNC: 143 MMOL/L (ref 134–144)
T4 FREE SERPL-MCNC: 1.09 NG/DL (ref 0.82–1.77)
TRIGL SERPL-MCNC: 112 MG/DL (ref 0–149)
TSH SERPL DL<=0.005 MIU/L-ACNC: 3.1 UIU/ML (ref 0.45–4.5)

## 2024-08-23 ENCOUNTER — HOSPITAL ENCOUNTER (OUTPATIENT)
Dept: MAMMOGRAPHY | Facility: IMAGING CENTER | Age: 69
Discharge: HOME | End: 2024-08-23
Payer: COMMERCIAL

## 2024-08-23 VITALS — BODY MASS INDEX: 33.04 KG/M2 | WEIGHT: 218 LBS | HEIGHT: 68 IN

## 2024-08-23 DIAGNOSIS — Z12.31 ENCOUNTER FOR SCREENING MAMMOGRAM FOR MALIGNANT NEOPLASM OF BREAST: ICD-10-CM

## 2024-08-23 PROCEDURE — 77063 BREAST TOMOSYNTHESIS BI: CPT

## 2024-08-23 PROCEDURE — 77067 SCR MAMMO BI INCL CAD: CPT

## 2024-08-28 ENCOUNTER — OFFICE VISIT (OUTPATIENT)
Dept: ENDOCRINOLOGY | Facility: HOSPITAL | Age: 69
End: 2024-08-28
Payer: COMMERCIAL

## 2024-08-28 VITALS
WEIGHT: 219.8 LBS | SYSTOLIC BLOOD PRESSURE: 120 MMHG | DIASTOLIC BLOOD PRESSURE: 78 MMHG | BODY MASS INDEX: 33.31 KG/M2 | HEIGHT: 68 IN | HEART RATE: 66 BPM

## 2024-08-28 DIAGNOSIS — E03.8 SUBCLINICAL HYPOTHYROIDISM: ICD-10-CM

## 2024-08-28 DIAGNOSIS — E55.9 VITAMIN D DEFICIENCY: ICD-10-CM

## 2024-08-28 DIAGNOSIS — E11.65 TYPE 2 DIABETES MELLITUS WITH HYPERGLYCEMIA, WITHOUT LONG-TERM CURRENT USE OF INSULIN (HCC): Primary | ICD-10-CM

## 2024-08-28 DIAGNOSIS — E04.2 GOITER, NONTOXIC, MULTINODULAR: ICD-10-CM

## 2024-08-28 DIAGNOSIS — I10 PRIMARY HYPERTENSION: Chronic | ICD-10-CM

## 2024-08-28 DIAGNOSIS — E11.42 DIABETIC POLYNEUROPATHY ASSOCIATED WITH TYPE 2 DIABETES MELLITUS (HCC): ICD-10-CM

## 2024-08-28 PROCEDURE — 99214 OFFICE O/P EST MOD 30 MIN: CPT | Performed by: INTERNAL MEDICINE

## 2024-08-28 RX ORDER — APIXABAN 5 MG/1
TABLET, FILM COATED ORAL
COMMUNITY
Start: 2024-02-01

## 2024-08-28 NOTE — PROGRESS NOTES
8/29/2024    Assessment & Plan      Diagnoses and all orders for this visit:    Type 2 diabetes mellitus with hyperglycemia, without long-term current use of insulin (HCC)  -     Comprehensive metabolic panel; Future  -     CBC and differential; Future  -     Hemoglobin A1C; Future  -     Albumin / creatinine urine ratio; Future  -     Vitamin D 25 hydroxy; Future  -     Comprehensive metabolic panel  -     CBC and differential  -     Hemoglobin A1C  -     Albumin / creatinine urine ratio  -     Vitamin D 25 hydroxy    Diabetic polyneuropathy associated with type 2 diabetes mellitus (HCC)  -     Comprehensive metabolic panel; Future  -     CBC and differential; Future  -     Hemoglobin A1C; Future  -     Albumin / creatinine urine ratio; Future  -     Vitamin D 25 hydroxy; Future  -     Comprehensive metabolic panel  -     CBC and differential  -     Hemoglobin A1C  -     Albumin / creatinine urine ratio  -     Vitamin D 25 hydroxy    Subclinical hypothyroidism  -     Comprehensive metabolic panel; Future  -     CBC and differential; Future  -     Hemoglobin A1C; Future  -     Albumin / creatinine urine ratio; Future  -     Vitamin D 25 hydroxy; Future  -     Comprehensive metabolic panel  -     CBC and differential  -     Hemoglobin A1C  -     Albumin / creatinine urine ratio  -     Vitamin D 25 hydroxy    Goiter, nontoxic, multinodular  -     Comprehensive metabolic panel; Future  -     CBC and differential; Future  -     Hemoglobin A1C; Future  -     Albumin / creatinine urine ratio; Future  -     Vitamin D 25 hydroxy; Future  -     Comprehensive metabolic panel  -     CBC and differential  -     Hemoglobin A1C  -     Albumin / creatinine urine ratio  -     Vitamin D 25 hydroxy    Primary hypertension  -     Comprehensive metabolic panel; Future  -     CBC and differential; Future  -     Hemoglobin A1C; Future  -     Albumin / creatinine urine ratio; Future  -     Vitamin D 25 hydroxy; Future  -     Comprehensive  metabolic panel  -     CBC and differential  -     Hemoglobin A1C  -     Albumin / creatinine urine ratio  -     Vitamin D 25 hydroxy    Vitamin D deficiency  -     Comprehensive metabolic panel; Future  -     CBC and differential; Future  -     Hemoglobin A1C; Future  -     Albumin / creatinine urine ratio; Future  -     Vitamin D 25 hydroxy; Future  -     Comprehensive metabolic panel  -     CBC and differential  -     Hemoglobin A1C  -     Albumin / creatinine urine ratio  -     Vitamin D 25 hydroxy    Other orders  -     Eliquis 5 MG          Assessment & Plan  1. Type 2 Diabetes Mellitus.  Her most recent hemoglobin A1c is 6.9%, demonstrating excellent control of her diabetes. She will continue the same low dose glipizide 2.5 mg daily. She will continue to test her blood sugars once a day and work on dietary changes and exercise.    2. Diabetic Neuropathy.  She has worsening neuropathic symptoms, particularly in her left foot and hands. She will start by trying vitamin B12 1000 mcg daily. The possibility of utilizing gabapentin and Lyrica was discussed, and she wants to hold off on these medicines for now but will call if she decides to try them.    3. Subclinical Hypothyroidism.  Her most recent thyroid function studies are normal, and she is biochemically euthyroid. These levels will be followed over time.    4. Nontoxic Multinodular Goiter.  The last thyroid ultrasound in February 2023 showed very small, nonworrisome benign nodules with a spongiform appearance. No repeat ultrasound is needed. She has no compressive thyroid symptoms.    5. Hypertension.  She is normotensive in the office. She will continue the same losartan 100 mg daily and metoprolol 50 mg twice a day.    6. Vitamin D Deficiency.  She will continue the same vitamin D2 1000 units daily.    Follow-up  She will follow up in 6 months with preceding hemoglobin A1c, CMP, CBC, 25 hydroxy vitamin D level, and urine microalbumin to creatinine  ratio.        CC: Diabetes type II, thyroid, thyroid nodule/goiter, blood pressure, vitamin D deficiency follow-up      History of Present Illness    HPI: Tyra Edward is a 69-year-old female with a history of type 2 diabetes with neuropathy diagnosed 5 years ago, hypertension, hyperlipidemia, subclinical hypothyroidism, multinodular goiter, and vitamin D deficiency, here for a follow-up visit.    Her current medication includes glipizide 2.5 mg daily.She reports frequent urination, sometimes as often as every 10 minutes, which she attributes to her high water intake due to working outdoors. Nighttime urination occurs once or twice. She experiences occasional intense hunger but does not report any blurry vision. Her last eye doctor visit was a month ago.     She experiences significant discomfort in her feet, particularly numbness in two toes on her left foot and a stabbing sensation when walking. She recalls an incident two months ago when she stepped out of the shower and felt a cutting sensation in her foot, which persists. She has not sought medical treatment for these symptoms.    She does not experience headaches, lightheadedness, dizziness, chest pain, or shortness of breath. Her current medications include losartan 100 mg daily and metoprolol 50 mg twice a day.    She takes vitamin D2 1000 units daily and does not take any other vitamins.    She began experiencing difficulty swallowing a few months ago, which has since improved. Dr. Brody prescribed an antibiotic for this issue. She continues to have some trouble swallowing, particularly lower down in her throat. She has not yet consulted a gastroenterologist. She does not report any symptoms of heart racing or palpitations. She occasionally experiences tremors or shaky hands, which she attributes to anxiety. Her anxiety is well-managed. She frequently suffers from diarrhea and fluctuates between feeling hot and cold. She struggles with sleep and  often feels tired.     She has a lot of trouble with her hands. Once it gets cool, her fingers get white and then her hands eventually get white and numb. She tries to warm them up. She actually wears gloves in the house, and when she gets them warm, then they get purplish.    Diabetes complications include neuropathy. She reports no nephropathy, retinopathy, heart attack, stroke, or claudication.    Blood Sugar/Glucometer/Pump/CGM review: She monitors her blood sugar levels daily, which typically range from 80 to 160, with occasional spikes or drops.      Historical Information   Past Medical History:   Diagnosis Date    Anemia     As a child    Anxiety     Arthritis 2016    Atrial fibrillation (HCC) 2016    Diabetes mellitus (HCC)     Difficulty walking     Diverticulitis of colon     Fatty liver     GERD (gastroesophageal reflux disease)     Hypertension 2016    Hypertensive urgency 2016    Hypokalemia 2016    Hypomagnesemia 2016    Lupus (HCC) 2023    Nephrolithiasis     Neuropathy in diabetes (HCC)     Ongoing symptomatic disease due to COVID-19 virus     Plantar fasciitis     PTSD (post-traumatic stress disorder)     Thyroid nodule      Past Surgical History:   Procedure Laterality Date    AV NODE ABLATION      CARDIAC SURGERY  2018    Ablation     SECTION      x3    CHOLECYSTECTOMY      COLONOSCOPY  2003    ENDOMETRIAL ABLATION      KIDNEY STONE SURGERY      OOPHORECTOMY Left 1978    UPPER GASTROINTESTINAL ENDOSCOPY       Social History   Social History     Substance and Sexual Activity   Alcohol Use Never     Social History     Substance and Sexual Activity   Drug Use Never     Social History     Tobacco Use   Smoking Status Never   Smokeless Tobacco Never     Family History:   Family History   Problem Relation Age of Onset    Ovarian cancer Mother 45    Heart disease Mother     Heart attack Father     Heart disease Father     Diabetes type II Sister     Ovarian  cancer Sister 62    Heart disease Sister     ALONZO disease Daughter     No Known Problems Daughter     No Known Problems Maternal Grandmother     No Known Problems Maternal Grandfather     No Known Problems Paternal Grandmother     No Known Problems Paternal Grandfather     Diabetes unspecified Brother         prediabetes    Hypertension Brother     Obesity Brother     ALONZO disease Son     No Known Problems Maternal Aunt     Diabetes type II Maternal Aunt     No Known Problems Maternal Aunt     Diabetes type II Maternal Aunt     No Known Problems Paternal Aunt     No Known Problems Paternal Aunt     Pancreatic cancer Cousin         30's    Melanoma Niece 41        Metastatic    Colon cancer Neg Hx     Colon polyps Neg Hx        Meds/Allergies   Current Outpatient Medications   Medication Sig Dispense Refill    ALPRAZolam (XANAX) 0.25 mg tablet Take 1 tablet by mouth 2 (two) times a day as needed      Eliquis 5 MG       famotidine (PEPCID) 40 MG tablet Take 40 mg by mouth 2 (two) times a day      GLIPIZIDE PO Take 2.5 mg by mouth daily Now being ordered as a 2.5 instead of a 5mg breaking in half.      losartan (COZAAR) 100 MG tablet Take 1 tablet (100 mg total) by mouth daily 90 tablet 3    metoprolol tartrate (LOPRESSOR) 50 mg tablet 100 mg 2 (two) times a day        Vitamin D, Cholecalciferol, 50 MCG (2000 UT) CAPS Take by mouth in the morning      dabigatran etexilate (PRADAXA) 150 mg capsu Take 150 mg by mouth 2 (two) times a day       No current facility-administered medications for this visit.     Allergies   Allergen Reactions    Dilaudid [Hydromorphone Hcl] Anaphylaxis    Hydromorphone Anaphylaxis     Other reaction(s): flushing, SOB    Metronidazole Anaphylaxis and Rash     Other reaction(s): nausea    Morphine Anaphylaxis, Rash and GI Intolerance     Other reaction(s): hives, flushing    Esomeprazole Other (See Comments)    Hydralazine Other (See Comments)     Cannot remember reation  Other reaction(s):  "vomiting, sweating, chills, itching    Metformin Other (See Comments)    Prednisone Other (See Comments)    Simvastatin Other (See Comments)     Other reaction(s): myalgias    Sulfamethoxazole-Trimethoprim Other (See Comments)     Other reaction(s): cannot recall    Valsartan GI Intolerance     Difficulty, swallowing, breathing  Other reaction(s): epigastric pain/GERD    Amlodipine Rash     Other reaction(s): ankle swelling    Ciprofloxacin GI Intolerance     Other reaction(s): unknown    Clindamycin Other (See Comments)     C diff  Other reaction(s): c-diff infection    Dexlansoprazole Other (See Comments)     Unknown per pt  Other reaction(s): shortness of breath    Metformin Hydrochloride-Repaglinide [Repaglinide-Metformin Hcl] Other (See Comments)     Unknown per pt      Nexium [Esomeprazole Magnesium] Diarrhea and GI Intolerance    Pantoprazole Diarrhea and GI Intolerance     Other reaction(s): allergy to generic only       Objective   Vitals: Blood pressure 120/78, pulse 66, height 5' 8\" (1.727 m), weight 99.7 kg (219 lb 12.8 oz).  Invasive Devices       None                   Physical Exam    No lid lag, stare, proptosis or periorbital edema noted in the eyes.  Thyroid is irregular in feel, but not particularly enlarged. No palpable thyroid nodules.  Lungs are clear to auscultation.  Heart has a regular rate and rhythm.  No tremor of the outstretched hands. Patellar deep tendon reflexes are normal. No lower extremity edema.      The history was obtained from the review of the chart and from the patient.    Lab Results:    Most recent Alc is  Lab Results   Component Value Date    HGBA1C 6.9 (H) 07/24/2024           Blood work performed on 7/24/2024 showed a CMP with a glucose of 126 fasting but was otherwise normal.    Lab Results   Component Value Date    CREATININE 0.84 07/24/2024    CREATININE 0.87 02/12/2024    CREATININE 0.80 12/07/2022    BUN 14 07/24/2024     12/11/2015    K 4.7 07/24/2024    CL " 104 07/24/2024    CO2 25 07/24/2024     eGFR   Date Value Ref Range Status   07/24/2024 75 >59 mL/min/1.73 Final   06/29/2018 54 ml/min/1.73sq m Final     Total cholesterol 173, LDL cholesterol 100.    Lab Results   Component Value Date    HDL 53 07/24/2024    TRIG 112 07/24/2024       Lab Results   Component Value Date    ALT 16 07/24/2024    AST 20 07/24/2024    ALKPHOS 155 (H) 06/29/2018       Lab Results   Component Value Date    TSH 3.100 07/24/2024    FREET4 1.09 07/24/2024             Future Appointments   Date Time Provider Department Center   3/4/2025  8:00 AM Juju Calvillo MD [facility]

## 2024-08-28 NOTE — PATIENT INSTRUCTIONS
Hgba1c is 6.9%. this is excellent diabetes control.     Continue the same glipizide.     Continue to test blood sugars once a day.     Given the blood sugars are good, I don't think the diabetes is causing the urine and yeast infections. Consider seeing urogynecologist to evaluate  e area for the cause.     The thyroid blood work is normal.     Cholesterol and liver and kidney function are good.     Start by trying vitamin B 12 1000 mcg daily.    If you choose to use diabetes neuropathy drugs like gabapentin and lyrica, let me know.     Follow up in 6 months with blood work.

## 2024-09-15 ENCOUNTER — APPOINTMENT (EMERGENCY)
Dept: RADIOLOGY | Facility: HOSPITAL | Age: 69
End: 2024-09-15
Payer: COMMERCIAL

## 2024-09-15 ENCOUNTER — HOSPITAL ENCOUNTER (EMERGENCY)
Facility: HOSPITAL | Age: 69
Discharge: HOME | End: 2024-09-16
Attending: EMERGENCY MEDICINE
Payer: COMMERCIAL

## 2024-09-15 DIAGNOSIS — M79.602 LEFT ARM PAIN: ICD-10-CM

## 2024-09-15 DIAGNOSIS — M25.512 LEFT SHOULDER PAIN: Primary | ICD-10-CM

## 2024-09-15 LAB
ALBUMIN SERPL BCG-MCNC: 4.1 G/DL (ref 3.5–5)
ALP SERPL-CCNC: 77 U/L (ref 34–104)
ALT SERPL W P-5'-P-CCNC: 19 U/L (ref 7–52)
ANION GAP SERPL CALCULATED.3IONS-SCNC: 6 MMOL/L (ref 4–13)
AST SERPL W P-5'-P-CCNC: 17 U/L (ref 13–39)
BASOPHILS # BLD AUTO: 0.06 THOUSANDS/ΜL (ref 0–0.1)
BASOPHILS NFR BLD AUTO: 1 % (ref 0–1)
BILIRUB SERPL-MCNC: 0.41 MG/DL (ref 0.2–1)
BUN SERPL-MCNC: 23 MG/DL (ref 5–25)
CALCIUM SERPL-MCNC: 9.5 MG/DL (ref 8.4–10.2)
CHLORIDE SERPL-SCNC: 105 MMOL/L (ref 96–108)
CO2 SERPL-SCNC: 28 MMOL/L (ref 21–32)
CREAT SERPL-MCNC: 0.98 MG/DL (ref 0.6–1.3)
EOSINOPHIL # BLD AUTO: 0.17 THOUSAND/ΜL (ref 0–0.61)
EOSINOPHIL NFR BLD AUTO: 2 % (ref 0–6)
ERYTHROCYTE [DISTWIDTH] IN BLOOD BY AUTOMATED COUNT: 11.8 % (ref 11.6–15.1)
GFR SERPL CREATININE-BSD FRML MDRD: 58 ML/MIN/1.73SQ M
GLUCOSE SERPL-MCNC: 122 MG/DL (ref 65–140)
HCT VFR BLD AUTO: 43.2 % (ref 34.8–46.1)
HGB BLD-MCNC: 14.4 G/DL (ref 11.5–15.4)
IMM GRANULOCYTES # BLD AUTO: 0.05 THOUSAND/UL (ref 0–0.2)
IMM GRANULOCYTES NFR BLD AUTO: 1 % (ref 0–2)
LYMPHOCYTES # BLD AUTO: 2.43 THOUSANDS/ΜL (ref 0.6–4.47)
LYMPHOCYTES NFR BLD AUTO: 25 % (ref 14–44)
MAGNESIUM SERPL-MCNC: 1.8 MG/DL (ref 1.9–2.7)
MCH RBC QN AUTO: 31.9 PG (ref 26.8–34.3)
MCHC RBC AUTO-ENTMCNC: 33.3 G/DL (ref 31.4–37.4)
MCV RBC AUTO: 96 FL (ref 82–98)
MONOCYTES # BLD AUTO: 1.04 THOUSAND/ΜL (ref 0.17–1.22)
MONOCYTES NFR BLD AUTO: 11 % (ref 4–12)
NEUTROPHILS # BLD AUTO: 5.83 THOUSANDS/ΜL (ref 1.85–7.62)
NEUTS SEG NFR BLD AUTO: 60 % (ref 43–75)
NRBC BLD AUTO-RTO: 0 /100 WBCS
PLATELET # BLD AUTO: 210 THOUSANDS/UL (ref 149–390)
PMV BLD AUTO: 11 FL (ref 8.9–12.7)
POTASSIUM SERPL-SCNC: 4.6 MMOL/L (ref 3.5–5.3)
PROT SERPL-MCNC: 7.5 G/DL (ref 6.4–8.4)
RBC # BLD AUTO: 4.52 MILLION/UL (ref 3.81–5.12)
SODIUM SERPL-SCNC: 139 MMOL/L (ref 135–147)
WBC # BLD AUTO: 9.58 THOUSAND/UL (ref 4.31–10.16)

## 2024-09-15 PROCEDURE — 71046 X-RAY EXAM CHEST 2 VIEWS: CPT

## 2024-09-15 PROCEDURE — 73030 X-RAY EXAM OF SHOULDER: CPT

## 2024-09-15 PROCEDURE — 36415 COLL VENOUS BLD VENIPUNCTURE: CPT | Performed by: EMERGENCY MEDICINE

## 2024-09-15 PROCEDURE — 73060 X-RAY EXAM OF HUMERUS: CPT

## 2024-09-15 PROCEDURE — 96375 TX/PRO/DX INJ NEW DRUG ADDON: CPT

## 2024-09-15 PROCEDURE — 80053 COMPREHEN METABOLIC PANEL: CPT | Performed by: EMERGENCY MEDICINE

## 2024-09-15 PROCEDURE — 84484 ASSAY OF TROPONIN QUANT: CPT | Performed by: EMERGENCY MEDICINE

## 2024-09-15 PROCEDURE — 93005 ELECTROCARDIOGRAM TRACING: CPT

## 2024-09-15 PROCEDURE — 96365 THER/PROPH/DIAG IV INF INIT: CPT

## 2024-09-15 PROCEDURE — 85025 COMPLETE CBC W/AUTO DIFF WBC: CPT | Performed by: EMERGENCY MEDICINE

## 2024-09-15 PROCEDURE — 83735 ASSAY OF MAGNESIUM: CPT | Performed by: EMERGENCY MEDICINE

## 2024-09-15 PROCEDURE — 99284 EMERGENCY DEPT VISIT MOD MDM: CPT

## 2024-09-15 RX ORDER — BACLOFEN 10 MG/1
10 TABLET ORAL ONCE
Status: COMPLETED | OUTPATIENT
Start: 2024-09-15 | End: 2024-09-15

## 2024-09-15 RX ORDER — MAGNESIUM SULFATE HEPTAHYDRATE 40 MG/ML
2 INJECTION, SOLUTION INTRAVENOUS ONCE
Status: COMPLETED | OUTPATIENT
Start: 2024-09-15 | End: 2024-09-16

## 2024-09-15 RX ORDER — ACETAMINOPHEN 10 MG/ML
1000 INJECTION, SOLUTION INTRAVENOUS ONCE
Status: COMPLETED | OUTPATIENT
Start: 2024-09-15 | End: 2024-09-16

## 2024-09-15 RX ORDER — KETOROLAC TROMETHAMINE 30 MG/ML
15 INJECTION, SOLUTION INTRAMUSCULAR; INTRAVENOUS ONCE
Status: COMPLETED | OUTPATIENT
Start: 2024-09-15 | End: 2024-09-15

## 2024-09-15 RX ORDER — FAMOTIDINE 10 MG/ML
20 INJECTION, SOLUTION INTRAVENOUS ONCE
Status: COMPLETED | OUTPATIENT
Start: 2024-09-15 | End: 2024-09-15

## 2024-09-15 RX ADMIN — SODIUM CHLORIDE 1000 ML: 0.9 INJECTION, SOLUTION INTRAVENOUS at 23:29

## 2024-09-15 RX ADMIN — BACLOFEN 10 MG: 10 TABLET ORAL at 23:29

## 2024-09-15 RX ADMIN — KETOROLAC TROMETHAMINE 15 MG: 30 INJECTION, SOLUTION INTRAMUSCULAR; INTRAVENOUS at 23:28

## 2024-09-15 RX ADMIN — FAMOTIDINE 20 MG: 10 INJECTION, SOLUTION INTRAVENOUS at 23:28

## 2024-09-15 RX ADMIN — ACETAMINOPHEN 1000 MG: 10 INJECTION INTRAVENOUS at 23:27

## 2024-09-16 VITALS
RESPIRATION RATE: 16 BRPM | SYSTOLIC BLOOD PRESSURE: 206 MMHG | HEIGHT: 67 IN | WEIGHT: 200 LBS | HEART RATE: 65 BPM | DIASTOLIC BLOOD PRESSURE: 84 MMHG | TEMPERATURE: 98 F | OXYGEN SATURATION: 97 % | BODY MASS INDEX: 31.39 KG/M2

## 2024-09-16 LAB
ATRIAL RATE: 63 BPM
CARDIAC TROPONIN I PNL SERPL HS: 6 NG/L
P AXIS: 72 DEGREES
PR INTERVAL: 174 MS
QRS AXIS: 68 DEGREES
QRSD INTERVAL: 72 MS
QT INTERVAL: 424 MS
QTC INTERVAL: 433 MS
T WAVE AXIS: 67 DEGREES
VENTRICULAR RATE: 63 BPM

## 2024-09-16 PROCEDURE — 96367 TX/PROPH/DG ADDL SEQ IV INF: CPT

## 2024-09-16 PROCEDURE — 99285 EMERGENCY DEPT VISIT HI MDM: CPT | Performed by: EMERGENCY MEDICINE

## 2024-09-16 PROCEDURE — 93010 ELECTROCARDIOGRAM REPORT: CPT | Performed by: INTERNAL MEDICINE

## 2024-09-16 RX ORDER — SENNOSIDES 8.6 MG
650 CAPSULE ORAL EVERY 8 HOURS PRN
Qty: 30 TABLET | Refills: 0 | Status: SHIPPED | OUTPATIENT
Start: 2024-09-16

## 2024-09-16 RX ORDER — BACLOFEN 10 MG/1
10 TABLET ORAL 3 TIMES DAILY
Qty: 30 TABLET | Refills: 0 | Status: SHIPPED | OUTPATIENT
Start: 2024-09-16

## 2024-09-16 RX ORDER — LIDOCAINE 50 MG/G
2 PATCH TOPICAL DAILY
Qty: 30 PATCH | Refills: 0 | Status: SHIPPED | OUTPATIENT
Start: 2024-09-16

## 2024-09-16 RX ORDER — NAPROXEN 500 MG/1
500 TABLET ORAL 2 TIMES DAILY PRN
Qty: 20 TABLET | Refills: 0 | Status: SHIPPED | OUTPATIENT
Start: 2024-09-16 | End: 2024-09-16

## 2024-09-16 RX ORDER — LIDOCAINE 50 MG/G
2 PATCH TOPICAL ONCE
Status: DISCONTINUED | OUTPATIENT
Start: 2024-09-16 | End: 2024-09-16 | Stop reason: HOSPADM

## 2024-09-16 RX ADMIN — LIDOCAINE 2 PATCH: 50 PATCH CUTANEOUS at 00:24

## 2024-09-16 RX ADMIN — MAGNESIUM SULFATE HEPTAHYDRATE 2 G: 40 INJECTION, SOLUTION INTRAVENOUS at 00:02

## 2024-09-16 NOTE — ED PROVIDER NOTES
1. Left shoulder pain    2. Left arm pain      ED Disposition       ED Disposition   Discharge    Condition   Stable    Date/Time   Mon Sep 16, 2024 12:11 AM    Sherie Edward discharge to home/self care.                   Assessment & Plan       Medical Decision Making  Obtain blood work, x-ray of left humerus, left shoulder, chest x-ray, EKG  Give IV fluids, pain medication and continue to monitor patient for any worsening symptoms.  Patient is refusing steroids.    Lab and radiology results were essentially unremarkable.  Patient felt better with IV fluids and pain medication in the ED.  At this time patient's pain is most likely secondary to musculoskeletal strain/sprain.  Patient placed on muscle relaxants as well as pain medication and Lidoderm patch.  Patient refused steroids as she is a diabetic.  Patient given sling for comfort to the left upper extremity.  Patient discharged with follow-up to PCP as well as orthopedic surgery.  Close return instructions given to return to the ER for any worsening symptoms.  Patient agrees with discharge plan.  Patient well appearing at time of discharge.    Please Note: Fluency Direct voice recognition software may have been used in the creation of this document. Wrong words or sound a like substitutions may have occurred due to the inherent limitations of the voice software.           Amount and/or Complexity of Data Reviewed  External Data Reviewed: ECG.  Labs: ordered. Decision-making details documented in ED Course.  Radiology: ordered and independent interpretation performed.  ECG/medicine tests: ordered and independent interpretation performed. Decision-making details documented in ED Course.    Risk  Prescription drug management.                  ED Course as of 09/16/24 0031   Mon Sep 16, 2024   0001 Patient reexamined at bedside.  Pain is starting to improve.       Medications   lidocaine (LIDODERM) 5 % patch 2 patch (2 patches Topical Medication  Applied 9/16/24 0024)   sodium chloride 0.9 % bolus 1,000 mL (0 mL Intravenous Stopped 9/16/24 0023)   magnesium sulfate 2 g/50 mL IVPB (premix) 2 g (0 g Intravenous Stopped 9/16/24 0023)   ketorolac (TORADOL) injection 15 mg (15 mg Intravenous Given 9/15/24 2328)   acetaminophen (Ofirmev) injection 1,000 mg (0 mg Intravenous Stopped 9/16/24 0002)   Famotidine (PF) (PEPCID) injection 20 mg (20 mg Intravenous Given 9/15/24 2328)   baclofen tablet 10 mg (10 mg Oral Given 9/15/24 2329)       History of Present Illness       69-year-old female with past history of atrial fibrillation on Eliquis, diabetes, GERD, hypertension, diarrhea, PTSD, presents to the ED for evaluation of left arm pain over the past few days.  Patient states that she and her  were some deep cleaning around her house.  Patient was working hard with a mop with a lot of swelling motion.  Patient then noted pain in the left humerus region over the past few days.  Pain has now started to go down her arm to the hand as well as up to her left shoulder.  Patient denies any other trauma, falls, or injuries.  Patient denies any chest pain.  Subsequently patient came to the ED for her worsening pain.  Patient states that she is having difficulty abducting the left shoulder secondary to pain at this time.      History provided by:  Patient  Arm Pain  Associated symptoms: no abdominal pain, no chest pain, no cough, no ear pain, no fever, no rash, no shortness of breath, no sore throat and no vomiting            Review of Systems   Constitutional:  Negative for chills and fever.   HENT:  Negative for ear pain and sore throat.    Eyes:  Negative for pain and visual disturbance.   Respiratory:  Negative for cough and shortness of breath.    Cardiovascular:  Negative for chest pain and palpitations.   Gastrointestinal:  Negative for abdominal pain and vomiting.   Genitourinary:  Negative for dysuria and hematuria.   Musculoskeletal:  Positive for  arthralgias. Negative for back pain.   Skin:  Negative for color change and rash.   Neurological:  Negative for seizures and syncope.   All other systems reviewed and are negative.          Objective     ED Triage Vitals [09/15/24 2235]   Temperature Pulse Blood Pressure Respirations SpO2 Patient Position - Orthostatic VS   98 °F (36.7 °C) 65 (!) 227/101 16 99 % Sitting      Temp Source Heart Rate Source BP Location FiO2 (%) Pain Score    Temporal Monitor Right arm -- 9        Physical Exam  Vitals and nursing note reviewed.   Constitutional:       General: She is not in acute distress.     Appearance: She is well-developed.   HENT:      Head: Normocephalic and atraumatic.   Eyes:      Conjunctiva/sclera: Conjunctivae normal.   Cardiovascular:      Rate and Rhythm: Normal rate and regular rhythm.      Heart sounds: No murmur heard.  Pulmonary:      Effort: Pulmonary effort is normal. No respiratory distress.      Breath sounds: Normal breath sounds.   Abdominal:      Palpations: Abdomen is soft.      Tenderness: There is no abdominal tenderness.   Musculoskeletal:         General: No swelling.      Cervical back: Neck supple.      Comments: No erythema, ecchymosis, edema, or warmth to touch noted on examination of left upper back, shoulder, humerus as well as forearm.   strength intact bilateral.  Pulses intact bilateral upper extremities.  There is tenderness to palpation noted to the mid humerus region as well as over left shoulder.  Range of motion of left shoulder is limited in abduction secondary to pain.   Skin:     General: Skin is warm and dry.      Capillary Refill: Capillary refill takes less than 2 seconds.   Neurological:      Mental Status: She is alert.   Psychiatric:         Mood and Affect: Mood normal.         Labs Reviewed   MAGNESIUM - Abnormal       Result Value    Magnesium 1.8 (*)    HS TROPONIN I 0HR - Normal    hs TnI 0hr 6     CBC AND DIFFERENTIAL    WBC 9.58      RBC 4.52       Hemoglobin 14.4      Hematocrit 43.2      MCV 96      MCH 31.9      MCHC 33.3      RDW 11.8      MPV 11.0      Platelets 210      nRBC 0      Segmented % 60      Immature Grans % 1      Lymphocytes % 25      Monocytes % 11      Eosinophils Relative 2      Basophils Relative 1      Absolute Neutrophils 5.83      Absolute Immature Grans 0.05      Absolute Lymphocytes 2.43      Absolute Monocytes 1.04      Eosinophils Absolute 0.17      Basophils Absolute 0.06     COMPREHENSIVE METABOLIC PANEL    Sodium 139      Potassium 4.6      Chloride 105      CO2 28      ANION GAP 6      BUN 23      Creatinine 0.98      Glucose 122      Calcium 9.5      AST 17      ALT 19      Alkaline Phosphatase 77      Total Protein 7.5      Albumin 4.1      Total Bilirubin 0.41      eGFR 58      Narrative:     National Kidney Disease Foundation guidelines for Chronic Kidney Disease (CKD):     Stage 1 with normal or high GFR (GFR > 90 mL/min/1.73 square meters)    Stage 2 Mild CKD (GFR = 60-89 mL/min/1.73 square meters)    Stage 3A Moderate CKD (GFR = 45-59 mL/min/1.73 square meters)    Stage 3B Moderate CKD (GFR = 30-44 mL/min/1.73 square meters)    Stage 4 Severe CKD (GFR = 15-29 mL/min/1.73 square meters)    Stage 5 End Stage CKD (GFR <15 mL/min/1.73 square meters)  Note: GFR calculation is accurate only with a steady state creatinine   HS TROPONIN I 2HR   HS TROPONIN I 4HR     XR shoulder 2+ views LEFT   ED Interpretation by Kourtney Bosch DO (09/15 2331)   No acute bony abnormalities, deformities or dislocation noted.  Formal radiology reading pending.      XR humerus LEFT   ED Interpretation by Kourtney Bosch DO (09/15 2331)   No acute bony abnormalities noted on humerus.  Formal radiology reading is pending.      XR chest 2 views   ED Interpretation by Kourtney Bosch DO (09/15 2331)   No acute abnormalities on infiltrate noted on chest x-ray.  Formal radiology reading is pending.          ECG 12 Lead Documentation  Only    Date/Time: 9/15/2024 11:43 PM    Performed by: Kourtney Bosch DO  Authorized by: Kourtney Bosch DO    Indications / Diagnosis:  Pain  ECG reviewed by me, the ED Provider: yes    Patient location:  ED  Previous ECG:     Previous ECG:  Compared to current    Similarity:  No change    Comparison to cardiac monitor: Yes    Interpretation:     Interpretation: normal    Comments:      This rhythm, rate 63, normal axis, normal intervals, no acute ST/T wave abnormalities noted, otherwise unremarkable EKG, unchanged from previous study.         Kourtney Bosch DO  09/16/24 0032

## 2024-09-25 ENCOUNTER — HOSPITAL ENCOUNTER (EMERGENCY)
Dept: HOSPITAL 99 - EMR | Age: 69
Discharge: HOME | End: 2024-09-25
Payer: COMMERCIAL

## 2024-09-25 VITALS — DIASTOLIC BLOOD PRESSURE: 76 MMHG | RESPIRATION RATE: 19 BRPM | SYSTOLIC BLOOD PRESSURE: 134 MMHG

## 2024-09-25 VITALS — DIASTOLIC BLOOD PRESSURE: 62 MMHG | RESPIRATION RATE: 22 BRPM | SYSTOLIC BLOOD PRESSURE: 146 MMHG

## 2024-09-25 VITALS — SYSTOLIC BLOOD PRESSURE: 174 MMHG | RESPIRATION RATE: 18 BRPM | DIASTOLIC BLOOD PRESSURE: 75 MMHG

## 2024-09-25 VITALS — RESPIRATION RATE: 20 BRPM | DIASTOLIC BLOOD PRESSURE: 84 MMHG | SYSTOLIC BLOOD PRESSURE: 129 MMHG

## 2024-09-25 DIAGNOSIS — B02.9: ICD-10-CM

## 2024-09-25 DIAGNOSIS — R51.9: ICD-10-CM

## 2024-09-25 DIAGNOSIS — Z87.891: ICD-10-CM

## 2024-09-25 DIAGNOSIS — Z79.84: ICD-10-CM

## 2024-09-25 DIAGNOSIS — M19.90: ICD-10-CM

## 2024-09-25 DIAGNOSIS — Z11.52: ICD-10-CM

## 2024-09-25 DIAGNOSIS — M79.10: ICD-10-CM

## 2024-09-25 DIAGNOSIS — I48.91: ICD-10-CM

## 2024-09-25 DIAGNOSIS — Z90.49: ICD-10-CM

## 2024-09-25 DIAGNOSIS — F43.10: ICD-10-CM

## 2024-09-25 DIAGNOSIS — R53.83: ICD-10-CM

## 2024-09-25 DIAGNOSIS — K21.9: ICD-10-CM

## 2024-09-25 DIAGNOSIS — I10: ICD-10-CM

## 2024-09-25 DIAGNOSIS — Z87.442: ICD-10-CM

## 2024-09-25 DIAGNOSIS — F41.9: ICD-10-CM

## 2024-09-25 DIAGNOSIS — Z88.1: ICD-10-CM

## 2024-09-25 DIAGNOSIS — E83.42: ICD-10-CM

## 2024-09-25 DIAGNOSIS — Z88.8: ICD-10-CM

## 2024-09-25 DIAGNOSIS — Z91.040: ICD-10-CM

## 2024-09-25 DIAGNOSIS — F32.A: ICD-10-CM

## 2024-09-25 DIAGNOSIS — R42: ICD-10-CM

## 2024-09-25 DIAGNOSIS — Z88.5: ICD-10-CM

## 2024-09-25 DIAGNOSIS — E11.9: ICD-10-CM

## 2024-09-25 DIAGNOSIS — R53.1: ICD-10-CM

## 2024-09-25 DIAGNOSIS — Z79.82: ICD-10-CM

## 2024-09-25 DIAGNOSIS — N39.0: Primary | ICD-10-CM

## 2024-09-25 LAB
ALBUMIN SERPL-MCNC: 4.1 G/DL (ref 3.5–5)
ALP SERPL-CCNC: 100 U/L (ref 38–126)
ALT SERPL-CCNC: 33 U/L (ref 0–35)
AST SERPL-CCNC: 37 U/L (ref 14–36)
BUN SERPL-MCNC: 15 MG/DL (ref 7–17)
CALCIUM SERPL-MCNC: 9.2 MG/DL (ref 8.4–10.2)
CHLORIDE SERPL-SCNC: 101 MMOL/L (ref 98–107)
CO2 SERPL-SCNC: 22 MMOL/L (ref 22–30)
EGFR: > 60
ERYTHROCYTE [DISTWIDTH] IN BLOOD BY AUTOMATED COUNT: 11.8 % (ref 11.5–14.5)
GLUCOSE SERPL-MCNC: 165 MG/DL (ref 70–99)
HCT VFR BLD AUTO: 42.5 % (ref 37–47)
HGB BLD-MCNC: 14.7 G/DL (ref 12–16)
MAGNESIUM SERPL-MCNC: 1.5 MG/DL (ref 1.6–2.3)
MCHC RBC AUTO-ENTMCNC: 34.6 G/DL (ref 33–37)
MCV RBC AUTO: 91.2 FL (ref 81–99)
NRBC BLD AUTO-RTO: 0 %
PLATELET # BLD AUTO: 183 10^3/UL (ref 130–400)
POTASSIUM SERPL-SCNC: 4.2 MMOL/L (ref 3.5–5.1)
PROT SERPL-MCNC: 6.8 G/DL (ref 6.3–8.2)
SODIUM SERPL-SCNC: 137 MMOL/L (ref 135–145)
TRANS CELLS UR QL COMP ASSIST: (no result) /LPF
URINE RED BLOOD CELL: (no result) /HPF (ref 0–2)

## 2024-09-25 PROCEDURE — 99284 EMERGENCY DEPT VISIT MOD MDM: CPT

## 2024-09-25 PROCEDURE — 96374 THER/PROPH/DIAG INJ IV PUSH: CPT

## 2024-09-25 RX ADMIN — CEPHALEXIN 500 MG: 500 CAPSULE ORAL at 13:03

## 2024-09-25 RX ADMIN — MAGNESIUM SULFATE HEPTAHYDRATE 102 GRAMS: 500 INJECTION, SOLUTION INTRAMUSCULAR; INTRAVENOUS at 11:56

## 2024-09-26 ENCOUNTER — TELEPHONE (OUTPATIENT)
Age: 69
End: 2024-09-26

## 2024-09-26 NOTE — TELEPHONE ENCOUNTER
Patient was in the hospital  she was out side of [facility]  she would like to see Juju  but their is nothing  soon.

## 2024-09-27 NOTE — TELEPHONE ENCOUNTER
Spoke to patient.  She was in [facility] on 9-23-24.  PCP Dr Brody should have all the records.  She has had continuous UTI's, diarrhea, nausea and dizziness, and problems with low magnesium.  She's not sure if she should follow up with endocrinology or her PCP.  Please advise.

## 2024-12-11 ENCOUNTER — TELEPHONE (OUTPATIENT)
Dept: GASTROENTEROLOGY | Facility: CLINIC | Age: 69
End: 2024-12-11

## 2024-12-11 ENCOUNTER — OFFICE VISIT (OUTPATIENT)
Dept: GASTROENTEROLOGY | Facility: CLINIC | Age: 69
End: 2024-12-11
Payer: COMMERCIAL

## 2024-12-11 VITALS
BODY MASS INDEX: 34.81 KG/M2 | DIASTOLIC BLOOD PRESSURE: 79 MMHG | WEIGHT: 221.8 LBS | HEIGHT: 67 IN | SYSTOLIC BLOOD PRESSURE: 196 MMHG

## 2024-12-11 DIAGNOSIS — Z79.01 LONG TERM (CURRENT) USE OF ANTICOAGULANTS: ICD-10-CM

## 2024-12-11 DIAGNOSIS — R19.4 CHANGE IN BOWEL HABIT: ICD-10-CM

## 2024-12-11 DIAGNOSIS — I48.0 PAF (PAROXYSMAL ATRIAL FIBRILLATION) (HCC): ICD-10-CM

## 2024-12-11 DIAGNOSIS — R13.14 PHARYNGOESOPHAGEAL DYSPHAGIA: Primary | ICD-10-CM

## 2024-12-11 DIAGNOSIS — K21.9 GASTROESOPHAGEAL REFLUX DISEASE, UNSPECIFIED WHETHER ESOPHAGITIS PRESENT: ICD-10-CM

## 2024-12-11 DIAGNOSIS — Z12.11 SCREENING FOR COLON CANCER: ICD-10-CM

## 2024-12-11 PROBLEM — C18.9 COLON CANCER (HCC): Status: RESOLVED | Noted: 2021-04-20 | Resolved: 2024-12-11

## 2024-12-11 PROCEDURE — G2211 COMPLEX E/M VISIT ADD ON: HCPCS | Performed by: INTERNAL MEDICINE

## 2024-12-11 PROCEDURE — 99204 OFFICE O/P NEW MOD 45 MIN: CPT | Performed by: INTERNAL MEDICINE

## 2024-12-11 RX ORDER — RABEPRAZOLE SODIUM 20 MG/1
20 TABLET, DELAYED RELEASE ORAL DAILY
Qty: 30 TABLET | Refills: 1 | Status: SHIPPED | OUTPATIENT
Start: 2024-12-11

## 2024-12-11 RX ORDER — SODIUM CHLORIDE, SODIUM LACTATE, POTASSIUM CHLORIDE, CALCIUM CHLORIDE 600; 310; 30; 20 MG/100ML; MG/100ML; MG/100ML; MG/100ML
125 INJECTION, SOLUTION INTRAVENOUS CONTINUOUS
OUTPATIENT
Start: 2024-12-11

## 2024-12-11 NOTE — TELEPHONE ENCOUNTER
Scheduled date of egd (as of today):  01/07/2025  Physician performing colonoscopy: neo prince  Location of colonoscopy:  [facility]    Instructions reviewed with patient by: ml  Clearances: Cardiac clearance on Eliquis and Diabetic on Glimipiride

## 2024-12-11 NOTE — PROGRESS NOTES
[facility] - New Patient Visit   Tyra Edward 69 y.o. female MRN: [id number]  Encounter: [id number]    ASSESSMENT AND PLAN:      1. Pharyngoesophageal dysphagia (Primary)  -Patient with ongoing problems with pharyngeal esophageal dysphagia.  Has for solids and liquids.  This has been going on about 8 months.  Occurs daily.  She has had some symptoms and signs of esophageal obstruction.  Interestingly she had EGD in 2021 and no significant abnormality was noted.  She had an empiric esophageal dilation with good results at that time and did not really have any symptoms until the past year.-  -Possibilities include an esophageal stricture or web--does have some odynophagia.  Need to consider erosive esophagitis.  Need to rule out esophageal neoplasm, consider esophageal motility disorder i.e. achalasia    - FL barium swallow; Future  - EGD; Future  -Has had symptoms on and off from her 40s  -Plan to do EGD before years and probable dilation.  -Patient reports having hiatal hernia reduction surgery about 30 years ago.    2. Screening for colon cancer  ---Patient reports she has not had a colonoscopy for about 10 years.  Does believe she has had polyps in the past.  Would be due again.  Does not want to have colonoscopy immediately but will schedule in the next 3 months  - Colonoscopy; Future    3. PAF (paroxysmal atrial fibrillation) (HCC)  -Patient with a history of A-fib.  Had successful ablation  -Physical exam and regular rhythm at this time.  Does have occasional paroxysms of irregular heartbeat  -Follows in [facility]    4. Long term (current) use of anticoagulants  --Would advise patient to go off her Eliquis for 72 hours prior to procedure.  Risk of embolic event or stroke probably pretty low as she is in sinus both in time.  Will obtain clearance from her cardiologist in [place]-Dr. Jennings  He would need to be off her anticoagulation for her colonoscopy  "as well for the same time interval in relation    5. Gastroesophageal reflux disease, unspecified whether esophagitis present  -Does report some odynophagia at this time and heartburn less frequent.  Had been on famotidine twice a day with good results previously.  Now having more heartburn.  I would like her to take 2 famotidine in the evening or 40 mg.  Try rabeprazole 20 mg daily.  Please note patient can take brand-name pantoprazole and had good results but has trouble with the generic form.  Could not afford the medication.  Has not had any rashes with PPI but on omeprazole had severe diarrhea    - RABEprazole (ACIPHEX) 20 MG tablet; Take 1 tablet (20 mg total) by mouth daily  Dispense: 30 tablet; Refill: 1    6.  Change in bowel habit--patient reports increasing loose stools after initial bowel movement in the morning.  This has been going on for about 8 months or so.  No  blood per rectum noted.  Should check for microscopic colitis when doing colonoscopy      Followup Appointment: 4 mo   ______________________________________________________________________    Chief Complaint   Patient presents with    Difficulty Swallowing    Last colonoscopy in [facility]. before 2021 with polyps       HPI:   Tyra Edward is a 69 y.o. year old female who presents with history of dysphagia for solids and liquids.  Patient reports that this occurs nearly on a daily basis.  Patient states that solids make things particularly worse.  She even has some trouble swallowing water.  She has had times when pills will \"come back up.  She has some discomfort in her pharyngeal and upper esophageal area.  Sometimes she feels as though food \"goes down slow.  Patient reports she has had some type of problems with swallowing for nearly 30 years.  She was worked up 3 years ago with upper endoscopy.  This esophagus appeared normal on that exam and patient had an empiric esophageal dilation.  She responded well to the dilation and really " had no problems until earlier this year.  She had intended to come into the office sooner however she had different family obligations and emergencies and several relatives with life-threatening diseases needed her and her .  Patient has a history of chronic GERD.  She did well with brand-name pantoprazole but generic pantoprazole did not help her.  When she went on omeprazole she had uncontrollable diarrhea and this was discontinued.  Her PCP placed her on famotidine twice a day and this actually had good results until recently.  Patient has not had weight loss.  Patient is not up-to-date with respect to colon cancer screening.  Last colonoscopy was over 10 years ago.  She believes she had polyps.  She has had a change in bowel habit.  Specifically she states that she has loose stools several times a day after the initial bowel movement.  This is new over the last year as well.  There is no blood per rectum.    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)     Ongoing symptomatic disease due to COVID-19 virus     Plantar fasciitis     PTSD (post-traumatic stress disorder)     Thyroid nodule      Past Surgical History:   Procedure Laterality Date    AV NODE ABLATION      CARDIAC SURGERY  2018    Ablation     SECTION      x3    CHOLECYSTECTOMY      COLONOSCOPY  2003    ENDOMETRIAL ABLATION      KIDNEY STONE SURGERY      OOPHORECTOMY Left 1978    UPPER GASTROINTESTINAL ENDOSCOPY       Social History     Substance and Sexual Activity   Alcohol Use Never     Social History      Substance and Sexual Activity   Drug Use Never     Social History     Tobacco Use   Smoking Status Never   Smokeless Tobacco Never     Family History   Problem Relation Age of Onset    Ovarian cancer Mother 45    Heart disease Mother     Heart attack Father     Heart disease Father     Diabetes type II Sister     Ovarian cancer Sister 62    Heart disease Sister     ALONZO disease Daughter     No Known Problems Daughter     No Known Problems Maternal Grandmother     No Known Problems Maternal Grandfather     No Known Problems Paternal Grandmother     No Known Problems Paternal Grandfather     Diabetes unspecified Brother         prediabetes    Hypertension Brother     Obesity Brother     ALONZO disease Son     No Known Problems Maternal Aunt     Diabetes type II Maternal Aunt     No Known Problems Maternal Aunt     Diabetes type II Maternal Aunt     No Known Problems Paternal Aunt     No Known Problems Paternal Aunt     Pancreatic cancer Cousin         30's    Melanoma Niece 41        Metastatic    Colon cancer Neg Hx     Colon polyps Neg Hx        Meds/Allergies     Current Outpatient Medications:     acetaminophen (TYLENOL) 650 mg CR tablet    ALPRAZolam (XANAX) 0.25 mg tablet    baclofen 10 mg tablet    Eliquis 5 MG    famotidine (PEPCID) 40 MG tablet    GLIPIZIDE PO    losartan (COZAAR) 100 MG tablet    metoprolol tartrate (LOPRESSOR) 50 mg tablet    RABEprazole (ACIPHEX) 20 MG tablet    Vitamin D, Cholecalciferol, 50 MCG (2000 UT) CAPS    Allergies   Allergen Reactions    Dilaudid [Hydromorphone Hcl] Anaphylaxis    Hydromorphone Anaphylaxis     Other reaction(s): flushing, SOB    Metronidazole Anaphylaxis and Rash     Other reaction(s): nausea    Morphine Anaphylaxis, Rash and GI Intolerance     Other reaction(s): hives, flushing    Esomeprazole Other (See Comments)    Hydralazine Other (See Comments)     Cannot remember reation  Other reaction(s): vomiting, sweating, chills, itching    Metformin Other (See  "Comments)    Prednisone Other (See Comments)    Simvastatin Other (See Comments)     Other reaction(s): myalgias    Sulfamethoxazole-Trimethoprim Other (See Comments)     Other reaction(s): cannot recall    Valsartan GI Intolerance     Difficulty, swallowing, breathing  Other reaction(s): epigastric pain/GERD    Amlodipine Rash     Other reaction(s): ankle swelling    Ciprofloxacin GI Intolerance     Other reaction(s): unknown    Clindamycin Other (See Comments)     C diff  Other reaction(s): c-diff infection    Dexlansoprazole Other (See Comments)     Unknown per pt  Other reaction(s): shortness of breath    Metformin Hydrochloride-Repaglinide [Repaglinide-Metformin Hcl] Other (See Comments)     Unknown per pt      Nexium [Esomeprazole Magnesium] Diarrhea and GI Intolerance    Pantoprazole Diarrhea and GI Intolerance     Other reaction(s): allergy to generic only       PHYSICAL EXAM:    Blood pressure (!) 196/79, height 5' 7\" (1.702 m), weight 101 kg (221 lb 12.8 oz). Body mass index is 34.74 kg/m².  General Appearance: NAD, cooperative, alert  Eyes: Anicteric, conjunctiva pink   ENT:  Normocephalic, atraumatic, normal mucosa.    Neck:  Supple, symmetrical, trachea midline,   Resp:  Clear to auscultation bilaterally; no rales, rhonchi or wheezing; respirations unlabored   CV:  S1 S2, Regular rate and rhythm; no murmur, rub, or gallop.  GI:  Soft, non-tender, non-distended; normal bowel sounds; no masses, no organomegaly   Rectal: Deferred  Musculoskeletal: No cyanosis, clubbing or edema. Normal ROM.  Skin:  No jaundice, rashes, or lesions   Heme/Lymph: No palpable cervical lymphadenopathy  Psych: Normal affect, good eye contact  Neuro: No gross deficits, AAOx3    Lab Results:   Lab Results   Component Value Date    WBC 9.58 09/15/2024    HGB 14.4 09/15/2024    HCT 43.2 09/15/2024    MCV 96 09/15/2024     09/15/2024     Lab Results   Component Value Date     12/11/2015    K 4.6 09/15/2024     " 09/15/2024    CO2 28 09/15/2024    ANIONGAP 9 12/11/2015    BUN 23 09/15/2024    CREATININE 0.98 09/15/2024    GLUCOSE 120 12/11/2015    CALCIUM 9.5 09/15/2024    AST 17 09/15/2024    ALT 19 09/15/2024    ALKPHOS 77 09/15/2024    EGFR 58 09/15/2024           REVIEW OF SYSTEMS:    CONSTITUTIONAL: Denies any fever, chills, rigors, and weight loss.  HEENT: No earache or tinnitus. Denies hearing loss or visual disturbances.-Positive for throat discomfort and hoarseness  CARDIOVASCULAR: No chest pain or palpitations.   RESPIRATORY: Denies any cough, hemoptysis, intermittent increasing coughing often after eating.  GASTROINTESTINAL: As noted in the History of Present Illness.  Report increasing loose stool over the last 8 months or so  GENITOURINARY: Positive for frequent UTIs  NEUROLOGIC: No dizziness or vertigo, denies headaches.   MUSCULOSKELETAL: Positive for arthralgias and myalgia .   SKIN:-Positive for rosacea  ENDOCRINE: Denies excessive thirst. Denies intolerance to heat or cold.  PSYCHOSOCIAL: Denies depression or anxiety. Denies any recent memory loss.

## 2024-12-11 NOTE — PATIENT INSTRUCTIONS
[facility] Patient Visit   Tyra Edward 69 y.o. female MRN: [id number]  Encounter: [id number]    ASSESSMENT AND PLAN:      1. Pharyngoesophageal dysphagia (Primary)  -Patient with ongoing problems with pharyngeal esophageal dysphagia.  Has for solids and liquids.  This has been going on about 8 months.  Occurs daily.  She has had some symptoms and signs of esophageal obstruction.  Interestingly she had EGD in 2021 and no significant abnormality was noted.  She had an empiric esophageal dilation with good results at that time and did not really have any symptoms until the past year.-  -Possibilities include an esophageal stricture or web--does have some odynophagia.  Need to consider erosive esophagitis.  Need to rule out esophageal neoplasm, consider esophageal motility disorder i.e. achalasia    - FL barium swallow; Future  - EGD; Future  -Has had symptoms on and off from her 40s  -Plan to do EGD before years and probable dilation.  -Patient reports having hiatal hernia reduction surgery about 30 years ago.    2. Screening for colon cancer  ---Patient reports she has not had a colonoscopy for about 10 years.  Does believe she has had polyps in the past.  Would be due again.  Does not want to have colonoscopy immediately but will schedule in the next 3 months  - Colonoscopy; Future    3. PAF (paroxysmal atrial fibrillation) (HCC)  -Patient with a history of A-fib.  Had successful ablation  -Physical exam and regular rhythm at this time.  Does have occasional paroxysms of irregular heartbeat  -Follows in [facility]    4. Long term (current) use of anticoagulants  --Would advise patient to go off her Eliquis for 72 hours prior to procedure.  Risk of embolic event or stroke probably pretty low as she is in sinus both in time.  Will obtain clearance from her cardiologist in [place]-Dr. Jennings  He would need to be off her anticoagulation for her colonoscopy  as well for the same time interval in relation    5. Gastroesophageal reflux disease, unspecified whether esophagitis present  -Does report some odynophagia at this time and heartburn less frequent.  Had been on famotidine twice a day with good results previously.  Now having more heartburn.  I would like her to take 2 famotidine in the evening or 40 mg.  Try rabeprazole 20 mg daily.  Please note patient can take brand-name pantoprazole and had good results but has trouble with the generic form.  Could not afford the medication.  Has not had any rashes with PPI but on omeprazole had severe diarrhea    - RABEprazole (ACIPHEX) 20 MG tablet; Take 1 tablet (20 mg total) by mouth daily  Dispense: 30 tablet; Refill: 1    6.  Change in bowel habit--patient reports increasing loose stools after initial bowel movement in the morning.  This has been going on for about 8 months or so.  No  blood per rectum noted.  Should check for microscopic colitis when doing colonoscopy      Followup Appointment: 4 mo

## 2024-12-11 NOTE — TELEPHONE ENCOUNTER
Scheduled date of colonoscopy (as of today):  02/18/2025  Physician performing colonoscopy: James Daniels  Location of colonoscopy:  [facility]  Bowel prep reviewed with patient:  Miralax  Instructions reviewed with patient by:  milan  Clearances: Cardiac clearance on Eliquis and Diabetic on Glimipiride

## 2024-12-11 NOTE — H&P (VIEW-ONLY)
[facility] - New Patient Visit   Tyra Edward 69 y.o. female MRN: [id number]  Encounter: [id number]    ASSESSMENT AND PLAN:      1. Pharyngoesophageal dysphagia (Primary)  -Patient with ongoing problems with pharyngeal esophageal dysphagia.  Has for solids and liquids.  This has been going on about 8 months.  Occurs daily.  She has had some symptoms and signs of esophageal obstruction.  Interestingly she had EGD in 2021 and no significant abnormality was noted.  She had an empiric esophageal dilation with good results at that time and did not really have any symptoms until the past year.-  -Possibilities include an esophageal stricture or web--does have some odynophagia.  Need to consider erosive esophagitis.  Need to rule out esophageal neoplasm, consider esophageal motility disorder i.e. achalasia    - FL barium swallow; Future  - EGD; Future  -Has had symptoms on and off from her 40s  -Plan to do EGD before years and probable dilation.  -Patient reports having hiatal hernia reduction surgery about 30 years ago.    2. Screening for colon cancer  ---Patient reports she has not had a colonoscopy for about 10 years.  Does believe she has had polyps in the past.  Would be due again.  Does not want to have colonoscopy immediately but will schedule in the next 3 months  - Colonoscopy; Future    3. PAF (paroxysmal atrial fibrillation) (HCC)  -Patient with a history of A-fib.  Had successful ablation  -Physical exam and regular rhythm at this time.  Does have occasional paroxysms of irregular heartbeat  -Follows in [facility]    4. Long term (current) use of anticoagulants  --Would advise patient to go off her Eliquis for 72 hours prior to procedure.  Risk of embolic event or stroke probably pretty low as she is in sinus both in time.  Will obtain clearance from her cardiologist in [place]-Dr. Jennings  He would need to be off her anticoagulation for her colonoscopy  "as well for the same time interval in relation    5. Gastroesophageal reflux disease, unspecified whether esophagitis present  -Does report some odynophagia at this time and heartburn less frequent.  Had been on famotidine twice a day with good results previously.  Now having more heartburn.  I would like her to take 2 famotidine in the evening or 40 mg.  Try rabeprazole 20 mg daily.  Please note patient can take brand-name pantoprazole and had good results but has trouble with the generic form.  Could not afford the medication.  Has not had any rashes with PPI but on omeprazole had severe diarrhea    - RABEprazole (ACIPHEX) 20 MG tablet; Take 1 tablet (20 mg total) by mouth daily  Dispense: 30 tablet; Refill: 1    6.  Change in bowel habit--patient reports increasing loose stools after initial bowel movement in the morning.  This has been going on for about 8 months or so.  No  blood per rectum noted.  Should check for microscopic colitis when doing colonoscopy      Followup Appointment: 4 mo   ______________________________________________________________________    Chief Complaint   Patient presents with    Difficulty Swallowing    Last colonoscopy in [facility]. before 2021 with polyps       HPI:   Tyra Edward is a 69 y.o. year old female who presents with history of dysphagia for solids and liquids.  Patient reports that this occurs nearly on a daily basis.  Patient states that solids make things particularly worse.  She even has some trouble swallowing water.  She has had times when pills will \"come back up.  She has some discomfort in her pharyngeal and upper esophageal area.  Sometimes she feels as though food \"goes down slow.  Patient reports she has had some type of problems with swallowing for nearly 30 years.  She was worked up 3 years ago with upper endoscopy.  This esophagus appeared normal on that exam and patient had an empiric esophageal dilation.  She responded well to the dilation and really " had no problems until earlier this year.  She had intended to come into the office sooner however she had different family obligations and emergencies and several relatives with life-threatening diseases needed her and her .  Patient has a history of chronic GERD.  She did well with brand-name pantoprazole but generic pantoprazole did not help her.  When she went on omeprazole she had uncontrollable diarrhea and this was discontinued.  Her PCP placed her on famotidine twice a day and this actually had good results until recently.  Patient has not had weight loss.  Patient is not up-to-date with respect to colon cancer screening.  Last colonoscopy was over 10 years ago.  She believes she had polyps.  She has had a change in bowel habit.  Specifically she states that she has loose stools several times a day after the initial bowel movement.  This is new over the last year as well.  There is no blood per rectum.    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)     Ongoing symptomatic disease due to COVID-19 virus     Plantar fasciitis     PTSD (post-traumatic stress disorder)     Thyroid nodule      Past Surgical History:   Procedure Laterality Date    AV NODE ABLATION      CARDIAC SURGERY  2018    Ablation     SECTION      x3    CHOLECYSTECTOMY      COLONOSCOPY  2003    ENDOMETRIAL ABLATION      KIDNEY STONE SURGERY      OOPHORECTOMY Left 1978    UPPER GASTROINTESTINAL ENDOSCOPY       Social History     Substance and Sexual Activity   Alcohol Use Never     Social History      Substance and Sexual Activity   Drug Use Never     Social History     Tobacco Use   Smoking Status Never   Smokeless Tobacco Never     Family History   Problem Relation Age of Onset    Ovarian cancer Mother 45    Heart disease Mother     Heart attack Father     Heart disease Father     Diabetes type II Sister     Ovarian cancer Sister 62    Heart disease Sister     ALONZO disease Daughter     No Known Problems Daughter     No Known Problems Maternal Grandmother     No Known Problems Maternal Grandfather     No Known Problems Paternal Grandmother     No Known Problems Paternal Grandfather     Diabetes unspecified Brother         prediabetes    Hypertension Brother     Obesity Brother     ALONZO disease Son     No Known Problems Maternal Aunt     Diabetes type II Maternal Aunt     No Known Problems Maternal Aunt     Diabetes type II Maternal Aunt     No Known Problems Paternal Aunt     No Known Problems Paternal Aunt     Pancreatic cancer Cousin         30's    Melanoma Niece 41        Metastatic    Colon cancer Neg Hx     Colon polyps Neg Hx        Meds/Allergies     Current Outpatient Medications:     acetaminophen (TYLENOL) 650 mg CR tablet    ALPRAZolam (XANAX) 0.25 mg tablet    baclofen 10 mg tablet    Eliquis 5 MG    famotidine (PEPCID) 40 MG tablet    GLIPIZIDE PO    losartan (COZAAR) 100 MG tablet    metoprolol tartrate (LOPRESSOR) 50 mg tablet    RABEprazole (ACIPHEX) 20 MG tablet    Vitamin D, Cholecalciferol, 50 MCG (2000 UT) CAPS    Allergies   Allergen Reactions    Dilaudid [Hydromorphone Hcl] Anaphylaxis    Hydromorphone Anaphylaxis     Other reaction(s): flushing, SOB    Metronidazole Anaphylaxis and Rash     Other reaction(s): nausea    Morphine Anaphylaxis, Rash and GI Intolerance     Other reaction(s): hives, flushing    Esomeprazole Other (See Comments)    Hydralazine Other (See Comments)     Cannot remember reation  Other reaction(s): vomiting, sweating, chills, itching    Metformin Other (See  "Comments)    Prednisone Other (See Comments)    Simvastatin Other (See Comments)     Other reaction(s): myalgias    Sulfamethoxazole-Trimethoprim Other (See Comments)     Other reaction(s): cannot recall    Valsartan GI Intolerance     Difficulty, swallowing, breathing  Other reaction(s): epigastric pain/GERD    Amlodipine Rash     Other reaction(s): ankle swelling    Ciprofloxacin GI Intolerance     Other reaction(s): unknown    Clindamycin Other (See Comments)     C diff  Other reaction(s): c-diff infection    Dexlansoprazole Other (See Comments)     Unknown per pt  Other reaction(s): shortness of breath    Metformin Hydrochloride-Repaglinide [Repaglinide-Metformin Hcl] Other (See Comments)     Unknown per pt      Nexium [Esomeprazole Magnesium] Diarrhea and GI Intolerance    Pantoprazole Diarrhea and GI Intolerance     Other reaction(s): allergy to generic only       PHYSICAL EXAM:    Blood pressure (!) 196/79, height 5' 7\" (1.702 m), weight 101 kg (221 lb 12.8 oz). Body mass index is 34.74 kg/m².  General Appearance: NAD, cooperative, alert  Eyes: Anicteric, conjunctiva pink   ENT:  Normocephalic, atraumatic, normal mucosa.    Neck:  Supple, symmetrical, trachea midline,   Resp:  Clear to auscultation bilaterally; no rales, rhonchi or wheezing; respirations unlabored   CV:  S1 S2, Regular rate and rhythm; no murmur, rub, or gallop.  GI:  Soft, non-tender, non-distended; normal bowel sounds; no masses, no organomegaly   Rectal: Deferred  Musculoskeletal: No cyanosis, clubbing or edema. Normal ROM.  Skin:  No jaundice, rashes, or lesions   Heme/Lymph: No palpable cervical lymphadenopathy  Psych: Normal affect, good eye contact  Neuro: No gross deficits, AAOx3    Lab Results:   Lab Results   Component Value Date    WBC 9.58 09/15/2024    HGB 14.4 09/15/2024    HCT 43.2 09/15/2024    MCV 96 09/15/2024     09/15/2024     Lab Results   Component Value Date     12/11/2015    K 4.6 09/15/2024     " 09/15/2024    CO2 28 09/15/2024    ANIONGAP 9 12/11/2015    BUN 23 09/15/2024    CREATININE 0.98 09/15/2024    GLUCOSE 120 12/11/2015    CALCIUM 9.5 09/15/2024    AST 17 09/15/2024    ALT 19 09/15/2024    ALKPHOS 77 09/15/2024    EGFR 58 09/15/2024           REVIEW OF SYSTEMS:    CONSTITUTIONAL: Denies any fever, chills, rigors, and weight loss.  HEENT: No earache or tinnitus. Denies hearing loss or visual disturbances.-Positive for throat discomfort and hoarseness  CARDIOVASCULAR: No chest pain or palpitations.   RESPIRATORY: Denies any cough, hemoptysis, intermittent increasing coughing often after eating.  GASTROINTESTINAL: As noted in the History of Present Illness.  Report increasing loose stool over the last 8 months or so  GENITOURINARY: Positive for frequent UTIs  NEUROLOGIC: No dizziness or vertigo, denies headaches.   MUSCULOSKELETAL: Positive for arthralgias and myalgia .   SKIN:-Positive for rosacea  ENDOCRINE: Denies excessive thirst. Denies intolerance to heat or cold.  PSYCHOSOCIAL: Denies depression or anxiety. Denies any recent memory loss.

## 2024-12-13 NOTE — TELEPHONE ENCOUNTER
Provider: Dr. Daniels  Procedure: Colonoscopy/EGD  Scheduled Guillermo/e: 1/7/25/2/18/25  Location: [facility]  Medication(s) to stop: Eliquis  Days to hold: 3  Last dose should be: EGD-1/3/2025    Colonoscopy 2/13/2025  Requested from: PD  Date requested: 12/11/2024  Date received:  12/13/2024  Patient alerted:

## 2024-12-16 ENCOUNTER — TELEPHONE (OUTPATIENT)
Age: 69
End: 2024-12-16

## 2024-12-16 NOTE — TELEPHONE ENCOUNTER
PA for rabeprazole 20 mg      SUBMITTED to [facility]    via    [x]CMM-KEY: (Key: [id number]  PA Case ID #: [id number]  Rx #: [id number]      All office notes, labs and other pertaining documents and studies sent. Clinical questions answered. Awaiting determination from insurance company.     Turnaround time for your insurance to make a decision on your Prior Authorization can take 7-21 business days.

## 2024-12-20 ENCOUNTER — HOSPITAL ENCOUNTER (OUTPATIENT)
Dept: RADIOLOGY | Facility: HOSPITAL | Age: 69
End: 2024-12-20
Attending: INTERNAL MEDICINE
Payer: COMMERCIAL

## 2024-12-20 DIAGNOSIS — R13.14 PHARYNGOESOPHAGEAL DYSPHAGIA: ICD-10-CM

## 2024-12-20 PROCEDURE — 74220 X-RAY XM ESOPHAGUS 1CNTRST: CPT

## 2024-12-23 ENCOUNTER — TELEPHONE (OUTPATIENT)
Dept: GASTROENTEROLOGY | Facility: CLINIC | Age: 69
End: 2024-12-23

## 2024-12-23 ENCOUNTER — RESULTS FOLLOW-UP (OUTPATIENT)
Dept: GASTROENTEROLOGY | Facility: CLINIC | Age: 69
End: 2024-12-23

## 2024-12-23 NOTE — TELEPHONE ENCOUNTER
Patient had called with respect to questions regarding her medication.  She went to know when she should take her rabeprazole and famotidine.  Had recommended that she take her rabeprazole 20 mg in the morning before breakfast and then 40 mg of famotidine in the evening.  Did review the barium study with her over the phone.  She had reflux and a hiatal hernia but no stricture.  She is in for EGD and dilation in the early new year.

## 2024-12-23 NOTE — RESULT ENCOUNTER NOTE
Reviewed study with patient.  Patient with no stricture or significant abnormalities.  Did have a small hiatal hernia and reflux.  Patient does have solid food dysphagia.  She is in for EGD and possible dilation near future

## 2024-12-24 ENCOUNTER — ANESTHESIA (OUTPATIENT)
Dept: ANESTHESIOLOGY | Facility: AMBULATORY SURGERY CENTER | Age: 69
End: 2024-12-24

## 2024-12-24 ENCOUNTER — ANESTHESIA EVENT (OUTPATIENT)
Dept: ANESTHESIOLOGY | Facility: AMBULATORY SURGERY CENTER | Age: 69
End: 2024-12-24

## 2025-01-07 ENCOUNTER — HOSPITAL ENCOUNTER (OUTPATIENT)
Dept: GASTROENTEROLOGY | Facility: AMBULATORY SURGERY CENTER | Age: 70
Discharge: HOME | End: 2025-01-07
Payer: COMMERCIAL

## 2025-01-07 ENCOUNTER — ANESTHESIA EVENT (OUTPATIENT)
Dept: GASTROENTEROLOGY | Facility: AMBULATORY SURGERY CENTER | Age: 70
End: 2025-01-07

## 2025-01-07 ENCOUNTER — ANESTHESIA (OUTPATIENT)
Dept: GASTROENTEROLOGY | Facility: AMBULATORY SURGERY CENTER | Age: 70
End: 2025-01-07

## 2025-01-07 VITALS
TEMPERATURE: 97.8 F | DIASTOLIC BLOOD PRESSURE: 65 MMHG | BODY MASS INDEX: 34.69 KG/M2 | OXYGEN SATURATION: 97 % | HEIGHT: 67 IN | RESPIRATION RATE: 17 BRPM | HEART RATE: 59 BPM | WEIGHT: 221 LBS | SYSTOLIC BLOOD PRESSURE: 142 MMHG

## 2025-01-07 DIAGNOSIS — R13.14 PHARYNGOESOPHAGEAL DYSPHAGIA: ICD-10-CM

## 2025-01-07 PROCEDURE — 43450 DILATE ESOPHAGUS 1/MULT PASS: CPT | Performed by: INTERNAL MEDICINE

## 2025-01-07 PROCEDURE — 43239 EGD BIOPSY SINGLE/MULTIPLE: CPT | Performed by: INTERNAL MEDICINE

## 2025-01-07 PROCEDURE — 88312 SPECIAL STAINS GROUP 1: CPT | Performed by: PATHOLOGY

## 2025-01-07 PROCEDURE — 88305 TISSUE EXAM BY PATHOLOGIST: CPT | Performed by: PATHOLOGY

## 2025-01-07 RX ORDER — SODIUM CHLORIDE, SODIUM LACTATE, POTASSIUM CHLORIDE, CALCIUM CHLORIDE 600; 310; 30; 20 MG/100ML; MG/100ML; MG/100ML; MG/100ML
125 INJECTION, SOLUTION INTRAVENOUS CONTINUOUS
Status: DISCONTINUED | OUTPATIENT
Start: 2025-01-07 | End: 2025-01-11 | Stop reason: HOSPADM

## 2025-01-07 RX ORDER — LIDOCAINE HYDROCHLORIDE 20 MG/ML
INJECTION, SOLUTION EPIDURAL; INFILTRATION; INTRACAUDAL; PERINEURAL AS NEEDED
Status: DISCONTINUED | OUTPATIENT
Start: 2025-01-07 | End: 2025-01-07

## 2025-01-07 RX ORDER — PROPOFOL 10 MG/ML
INJECTION, EMULSION INTRAVENOUS AS NEEDED
Status: DISCONTINUED | OUTPATIENT
Start: 2025-01-07 | End: 2025-01-07

## 2025-01-07 RX ADMIN — PROPOFOL 20 MG: 10 INJECTION, EMULSION INTRAVENOUS at 08:05

## 2025-01-07 RX ADMIN — LIDOCAINE HYDROCHLORIDE 100 MG: 20 INJECTION, SOLUTION EPIDURAL; INFILTRATION; INTRACAUDAL; PERINEURAL at 08:03

## 2025-01-07 RX ADMIN — SODIUM CHLORIDE, SODIUM LACTATE, POTASSIUM CHLORIDE, CALCIUM CHLORIDE 125 ML/HR: 600; 310; 30; 20 INJECTION, SOLUTION INTRAVENOUS at 07:51

## 2025-01-07 RX ADMIN — PROPOFOL 30 MG: 10 INJECTION, EMULSION INTRAVENOUS at 08:06

## 2025-01-07 RX ADMIN — PROPOFOL 100 MG: 10 INJECTION, EMULSION INTRAVENOUS at 08:03

## 2025-01-07 NOTE — ANESTHESIA POSTPROCEDURE EVALUATION
Post-Op Assessment Note    CV Status:  Stable  Pain Score: 0    Pain management: adequate       Mental Status:  Alert and awake   Hydration Status:  Euvolemic   PONV Controlled:  Controlled   Airway Patency:  Patent     Post Op Vitals Reviewed: Yes    No anethesia notable event occurred.    Staff: Anesthesiologist           Last Filed PACU Vitals:  Vitals Value Taken Time   Temp     Pulse 59 01/07/25 0829   /65 01/07/25 0829   Resp 17 01/07/25 0829   SpO2 97 % 01/07/25 0829       Modified Stacia:     Vitals Value Taken Time   Activity 2 01/07/25 0829   Respiration 2 01/07/25 0829   Circulation 1 01/07/25 0829   Consciousness 2 01/07/25 0829   Oxygen Saturation 2 01/07/25 0829     Modified Stacia Score: 9

## 2025-01-07 NOTE — ANESTHESIA POSTPROCEDURE EVALUATION
Post-Op Assessment Note    CV Status:  Stable  Pain Score: 0    Pain management: adequate       Mental Status:  Alert and awake   Hydration Status:  Euvolemic   PONV Controlled:  Controlled   Airway Patency:  Patent     Post Op Vitals Reviewed: Yes    No anethesia notable event occurred.    Staff: CRNA, Anesthesiologist           Last Filed PACU Vitals:  Vitals Value Taken Time   Temp     Pulse     /62    Resp 12    SpO2 98%

## 2025-01-07 NOTE — ANESTHESIA PREPROCEDURE EVALUATION
Procedure:  EGD    Relevant Problems   CARDIO   (+) Hypertension   (+) Hypertensive urgency   (+) New onset a-fib (HCC)      ENDO   (+) Subclinical hypothyroidism   (+) Type 2 diabetes mellitus with hyperglycemia, without long-term current use of insulin (HCC)      GI/HEPATIC   (+) Dysphagia   (+) Gastroesophageal reflux disease without esophagitis      MUSCULOSKELETAL   (+) Arthritis      NEURO/PSYCH   (+) Anxiety   (+) Diabetic polyneuropathy associated with type 2 diabetes mellitus (HCC)      PULMONARY   (+) SOB (shortness of breath)   Eliquis stopped 48 hours   AV Node Ablation  Physical Exam    Airway    Mallampati score: III  TM Distance: >3 FB  Neck ROM: full     Dental   No notable dental hx     Cardiovascular      Pulmonary      Other Findings  post-pubertal.      Anesthesia Plan  ASA Score- 3     Anesthesia Type- IV sedation with anesthesia with ASA Monitors.         Additional Monitors:     Airway Plan:            Plan Factors-Exercise tolerance (METS): >4 METS.    Chart reviewed. EKG reviewed. Imaging results reviewed.  Patient summary reviewed.    Patient is not a current smoker.              Induction- intravenous.    Postoperative Plan-         Informed Consent- Anesthetic plan and risks discussed with patient.  I personally reviewed this patient with the CRNA. Discussed and agreed on the Anesthesia Plan with the CRNA..

## 2025-01-07 NOTE — INTERVAL H&P NOTE
H&P reviewed. After examining the patient I find no changes in the patients condition since the H&P had been written.    Vitals:    01/07/25 0737   BP: (!) 187/104   Pulse: 64   Resp: (!) 26   Temp: 97.8 °F (36.6 °C)   SpO2: 99%

## 2025-01-10 ENCOUNTER — RESULTS FOLLOW-UP (OUTPATIENT)
Dept: GASTROENTEROLOGY | Facility: CLINIC | Age: 70
End: 2025-01-10

## 2025-01-10 DIAGNOSIS — L43.9 LICHEN PLANUS: Primary | ICD-10-CM

## 2025-01-10 PROCEDURE — 88312 SPECIAL STAINS GROUP 1: CPT | Performed by: PATHOLOGY

## 2025-01-10 PROCEDURE — 88305 TISSUE EXAM BY PATHOLOGIST: CPT | Performed by: PATHOLOGY

## 2025-02-11 ENCOUNTER — RESULTS FOLLOW-UP (OUTPATIENT)
Dept: ENDOCRINOLOGY | Facility: HOSPITAL | Age: 70
End: 2025-02-11

## 2025-02-11 LAB
25(OH)D3+25(OH)D2 SERPL-MCNC: 51.2 NG/ML (ref 30–100)
ALBUMIN SERPL-MCNC: 4.3 G/DL (ref 3.8–4.8)
ALBUMIN/CREAT UR: 9 MG/G CREAT (ref 0–29)
ALP SERPL-CCNC: 98 IU/L (ref 44–121)
ALT SERPL-CCNC: 20 IU/L (ref 0–32)
AST SERPL-CCNC: 21 IU/L (ref 0–40)
BASOPHILS # BLD AUTO: 0 X10E3/UL (ref 0–0.2)
BASOPHILS NFR BLD AUTO: 1 %
BILIRUB SERPL-MCNC: 0.3 MG/DL (ref 0–1.2)
BUN SERPL-MCNC: 14 MG/DL (ref 8–27)
BUN/CREAT SERPL: 15 (ref 12–28)
CALCIUM SERPL-MCNC: 9.8 MG/DL (ref 8.7–10.3)
CHLORIDE SERPL-SCNC: 100 MMOL/L (ref 96–106)
CO2 SERPL-SCNC: 24 MMOL/L (ref 20–29)
CREAT SERPL-MCNC: 0.92 MG/DL (ref 0.57–1)
CREAT UR-MCNC: 158.1 MG/DL
EGFR: 67 ML/MIN/1.73
EOSINOPHIL # BLD AUTO: 0.1 X10E3/UL (ref 0–0.4)
EOSINOPHIL NFR BLD AUTO: 2 %
ERYTHROCYTE [DISTWIDTH] IN BLOOD BY AUTOMATED COUNT: 11.5 % (ref 11.7–15.4)
EST. AVERAGE GLUCOSE BLD GHB EST-MCNC: 146 MG/DL
GLOBULIN SER-MCNC: 2.7 G/DL (ref 1.5–4.5)
GLUCOSE SERPL-MCNC: 132 MG/DL (ref 70–99)
HBA1C MFR BLD: 6.7 % (ref 4.8–5.6)
HCT VFR BLD AUTO: 44.8 % (ref 34–46.6)
HGB BLD-MCNC: 14.7 G/DL (ref 11.1–15.9)
IMM GRANULOCYTES # BLD: 0 X10E3/UL (ref 0–0.1)
IMM GRANULOCYTES NFR BLD: 0 %
LYMPHOCYTES # BLD AUTO: 1.6 X10E3/UL (ref 0.7–3.1)
LYMPHOCYTES NFR BLD AUTO: 28 %
MCH RBC QN AUTO: 30.9 PG (ref 26.6–33)
MCHC RBC AUTO-ENTMCNC: 32.8 G/DL (ref 31.5–35.7)
MCV RBC AUTO: 94 FL (ref 79–97)
MICROALBUMIN UR-MCNC: 14.4 UG/ML
MONOCYTES # BLD AUTO: 0.7 X10E3/UL (ref 0.1–0.9)
MONOCYTES NFR BLD AUTO: 12 %
NEUTROPHILS # BLD AUTO: 3.2 X10E3/UL (ref 1.4–7)
NEUTROPHILS NFR BLD AUTO: 57 %
PLATELET # BLD AUTO: 226 X10E3/UL (ref 150–450)
POTASSIUM SERPL-SCNC: 5 MMOL/L (ref 3.5–5.2)
PROT SERPL-MCNC: 7 G/DL (ref 6–8.5)
RBC # BLD AUTO: 4.76 X10E6/UL (ref 3.77–5.28)
SODIUM SERPL-SCNC: 141 MMOL/L (ref 134–144)
WBC # BLD AUTO: 5.6 X10E3/UL (ref 3.4–10.8)

## 2025-02-24 ENCOUNTER — TELEPHONE (OUTPATIENT)
Age: 70
End: 2025-02-24

## 2025-02-24 NOTE — TELEPHONE ENCOUNTER
Patients GI provider:       Number to return call: [phone]    Reason for call: Pt called stating she has finished rabeprazole. Pt is asking if she is to continue the medication.     Scheduled procedure/appointment date if applicable: 4/8/2025

## 2025-03-04 ENCOUNTER — OFFICE VISIT (OUTPATIENT)
Dept: ENDOCRINOLOGY | Facility: HOSPITAL | Age: 70
End: 2025-03-04
Payer: COMMERCIAL

## 2025-03-04 ENCOUNTER — TELEPHONE (OUTPATIENT)
Dept: GASTROENTEROLOGY | Facility: CLINIC | Age: 70
End: 2025-03-04

## 2025-03-04 VITALS
BODY MASS INDEX: 34.75 KG/M2 | SYSTOLIC BLOOD PRESSURE: 142 MMHG | HEIGHT: 67 IN | WEIGHT: 221.4 LBS | HEART RATE: 66 BPM | DIASTOLIC BLOOD PRESSURE: 76 MMHG

## 2025-03-04 DIAGNOSIS — E55.9 VITAMIN D DEFICIENCY: ICD-10-CM

## 2025-03-04 DIAGNOSIS — I10 PRIMARY HYPERTENSION: Chronic | ICD-10-CM

## 2025-03-04 DIAGNOSIS — E66.01 MORBID (SEVERE) OBESITY DUE TO EXCESS CALORIES (HCC): ICD-10-CM

## 2025-03-04 DIAGNOSIS — K20.80 LYMPHOCYTIC ESOPHAGITIS: Primary | ICD-10-CM

## 2025-03-04 DIAGNOSIS — E11.65 TYPE 2 DIABETES MELLITUS WITH HYPERGLYCEMIA, WITHOUT LONG-TERM CURRENT USE OF INSULIN (HCC): Primary | ICD-10-CM

## 2025-03-04 DIAGNOSIS — E03.8 SUBCLINICAL HYPOTHYROIDISM: ICD-10-CM

## 2025-03-04 DIAGNOSIS — E04.2 GOITER, NONTOXIC, MULTINODULAR: ICD-10-CM

## 2025-03-04 DIAGNOSIS — E11.42 DIABETIC POLYNEUROPATHY ASSOCIATED WITH TYPE 2 DIABETES MELLITUS (HCC): ICD-10-CM

## 2025-03-04 PROCEDURE — 99214 OFFICE O/P EST MOD 30 MIN: CPT | Performed by: INTERNAL MEDICINE

## 2025-03-04 RX ORDER — RABEPRAZOLE SODIUM 20 MG/1
20 TABLET, DELAYED RELEASE ORAL 2 TIMES DAILY
Qty: 60 TABLET | Refills: 2 | Status: SHIPPED | OUTPATIENT
Start: 2025-03-04 | End: 2025-04-03

## 2025-03-04 NOTE — TELEPHONE ENCOUNTER
Patient with a history of unsure  linchiod lymphocytic esophagitis and history of dysphagia.  Evidently she finished her rabeprazole.  Would like her to go back on rabeprazole 20 twice a day.  Please see if we can have the patient seen by Dr. Knight sooner than late April.  If not I could see her sooner.  May consider topical steroids for the treatment has other oral/vaginal lesions consistent with Lichen planus

## 2025-03-04 NOTE — PROGRESS NOTES
3/4/2025    Assessment & Plan      Diagnoses and all orders for this visit:    Type 2 diabetes mellitus with hyperglycemia, without long-term current use of insulin (HCC)  -     Comprehensive metabolic panel; Future  -     T4, free; Future  -     TSH, 3rd generation; Future  -     Hemoglobin A1C; Future  -     Lipid Panel with Direct LDL reflex; Future  -     Comprehensive metabolic panel  -     T4, free  -     TSH, 3rd generation  -     Hemoglobin A1C  -     Lipid Panel with Direct LDL reflex    Diabetic polyneuropathy associated with type 2 diabetes mellitus (HCC)  -     Comprehensive metabolic panel; Future  -     T4, free; Future  -     TSH, 3rd generation; Future  -     Hemoglobin A1C; Future  -     Lipid Panel with Direct LDL reflex; Future  -     Comprehensive metabolic panel  -     T4, free  -     TSH, 3rd generation  -     Hemoglobin A1C  -     Lipid Panel with Direct LDL reflex    Subclinical hypothyroidism  -     Comprehensive metabolic panel; Future  -     T4, free; Future  -     TSH, 3rd generation; Future  -     Hemoglobin A1C; Future  -     Lipid Panel with Direct LDL reflex; Future  -     Comprehensive metabolic panel  -     T4, free  -     TSH, 3rd generation  -     Hemoglobin A1C  -     Lipid Panel with Direct LDL reflex    Goiter, nontoxic, multinodular  -     Comprehensive metabolic panel; Future  -     T4, free; Future  -     TSH, 3rd generation; Future  -     Hemoglobin A1C; Future  -     Lipid Panel with Direct LDL reflex; Future  -     Comprehensive metabolic panel  -     T4, free  -     TSH, 3rd generation  -     Hemoglobin A1C  -     Lipid Panel with Direct LDL reflex    Primary hypertension  -     Comprehensive metabolic panel; Future  -     T4, free; Future  -     TSH, 3rd generation; Future  -     Hemoglobin A1C; Future  -     Lipid Panel with Direct LDL reflex; Future  -     Comprehensive metabolic panel  -     T4, free  -     TSH, 3rd generation  -     Hemoglobin A1C  -     Lipid Panel  with Direct LDL reflex    Vitamin D deficiency  -     Comprehensive metabolic panel; Future  -     T4, free; Future  -     TSH, 3rd generation; Future  -     Hemoglobin A1C; Future  -     Lipid Panel with Direct LDL reflex; Future  -     Comprehensive metabolic panel  -     T4, free  -     TSH, 3rd generation  -     Hemoglobin A1C  -     Lipid Panel with Direct LDL reflex    Morbid (severe) obesity due to excess calories (HCC)          Assessment & Plan  1. Type 2 diabetes mellitus.  Her hemoglobin A1c level is commendably controlled at 6.7 percent. There is no evidence of diabetic nephropathy based on her urine test results. Liver and kidney functions are within normal limits. She will persist with her current glipizide regimen, continue to monitor her blood glucose levels twice a day, maintain a healthy diet, and engage in physical activity as tolerated.    2. Hypertension.  Her blood pressure readings are within acceptable ranges. She will continue her current medication regimen, including losartan 50 mg and metoprolol 100 mg twice a day. She is advised to follow up with her cardiologist for further management.    3. Hyperlipidemia.  She will continue her current lipid-lowering therapy.    4. Subclinical hypothyroidism.  I will repeat her thyroid function studies with her next visit.  So far, no treatment of her subclinical hypothyroidism has been needed.    5. Multinodular goiter.  She will continue to monitor her thyroid nodules with serial ultrasounds over time.    6. Vitamin D deficiency.  Her vitamin D levels are within the normal range. She will continue her current vitamin D supplementation at 2000 units daily.    7.  Diabetic neuropathy.  She has stable neuropathic symptoms.  Diabetic foot exam was performed in the office today.      Follow-up  The patient is scheduled for a follow-up visit in 6 months with preceding hemoglobin A1c, CMP, lipid panel, TSH, and free T4..      CC: Diabetes type II, lipid,  blood pressure, vitamin D deficiency, thyroid follow-up    History of Present Illness    HPI: Tyra Edward is a 69-year-old female with a history of type 2 diabetes with neuropathy diagnosed 6 years ago, hypertension, hyperlipidemia, subclinical hypothyroidism, multinodular goiter, and vitamin D deficiency, here for a follow-up visit.     Diabetes complications include neuropathy as she reports no nephropathy, retinopathy, heart attack, stroke, or claudication.    She reports overall well-being with her current regimen of glipizide 2.5 mg daily. She experiences intermittent polyuria, typically waking up once during the night to urinate. She also reports occasional polydipsia and polyphagia, with sporadic episodes of intense cravings. Despite being overweight and experiencing weight gain, she maintains a balanced diet rich in fruits, vegetables, and lean proteins, and avoids junk food.     She reports no chest pain or shortness of breath, except during episodes of esophageal pain. She has not been engaging in regular physical activity.    She has a history of atrial fibrillation and was advised by her cardiologist to monitor her heart rate. She occasionally experiences shortness of breath when her heart rate is elevated. She is currently on losartan 50 mg and metoprolol 100 mg twice daily for blood pressure management.    She reports difficulty swallowing, with a sensation of food getting stuck in her throat, even with water. This symptom has been present since her esophageal dilation procedure. She also experiences pain and spasms associated with swallowing, as well as heartburn. She underwent an endoscopy with biopsies, which revealed lichenoid lymphocytic esophagitis. She is scheduled to see a specialist on 04/08/2025 for further evaluation and management. She is concerned about potential gastrointestinal side effects of her medications.    She reports that her fingertips and toes turn white and then  purple in response to cold temperatures. She uses foot warmers and gloves to manage this condition.     She also experiences numbness and tingling in her feet, which have been evaluated by a podiatrist. She has noticed changes in her foot structure, including flattening and toe rubbing, which have resulted in redness. She has tried several different types of sneakers to alleviate these symptoms.  Diabetic foot exam was over a year ago.    She reports intermittent blurry vision and has not had an eye examination in over a year.    Blood Sugar/Glucometer/Pump/CGM review: She monitors her blood glucose levels twice daily, with readings ranging from 140 in the morning to 160-180 postprandial. On rare occasions, her blood glucose levels exceed 200, which she manages by increasing her water intake. Hypoglycemic episodes are infrequent and typically occur during periods of increased physical activity.         Historical Information   Past Medical History:   Diagnosis Date    Anemia     As a child    Anxiety     Arthritis 2016    Atrial fibrillation (HCC)     Diabetes mellitus (HCC)     Difficulty walking     Diverticulitis of colon     Fatty liver     GERD (gastroesophageal reflux disease)     Hypertension 2016    Hypertensive urgency 2016    Hypokalemia 2016    Hypomagnesemia 2016    Lichen planus     esophagus    Lupus 2023    Lymphocytic esophagitis     lichenoid lymphocytic esophagitis    Nephrolithiasis     Neuropathy in diabetes (HCC)     Ongoing symptomatic disease due to COVID-19 virus     Plantar fasciitis     PTSD (post-traumatic stress disorder)     Thyroid nodule      Past Surgical History:   Procedure Laterality Date    AV NODE ABLATION      CARDIAC SURGERY  2018    Ablation     SECTION      x3    CHOLECYSTECTOMY      COLONOSCOPY  2003    ENDOMETRIAL ABLATION      KIDNEY STONE SURGERY      OOPHORECTOMY Left 1978    UPPER GASTROINTESTINAL ENDOSCOPY       Social  History   Social History     Substance and Sexual Activity   Alcohol Use Never     Social History     Substance and Sexual Activity   Drug Use Never     Social History     Tobacco Use   Smoking Status Never   Smokeless Tobacco Never     Family History:   Family History   Problem Relation Age of Onset    Ovarian cancer Mother 45    Heart disease Mother     Heart attack Father     Heart disease Father     Diabetes type II Sister     Ovarian cancer Sister 62    Heart disease Sister     ALONZO disease Daughter     No Known Problems Daughter     No Known Problems Maternal Grandmother     No Known Problems Maternal Grandfather     No Known Problems Paternal Grandmother     No Known Problems Paternal Grandfather     Diabetes unspecified Brother         prediabetes    Hypertension Brother     Obesity Brother     ALONZO disease Son     No Known Problems Maternal Aunt     Diabetes type II Maternal Aunt     No Known Problems Maternal Aunt     Diabetes type II Maternal Aunt     No Known Problems Paternal Aunt     No Known Problems Paternal Aunt     Pancreatic cancer Cousin         30's    Melanoma Niece 41        Metastatic    Colon cancer Neg Hx     Colon polyps Neg Hx        Meds/Allergies   Current Outpatient Medications   Medication Sig Dispense Refill    ALPRAZolam (XANAX) 0.25 mg tablet Take 1 tablet by mouth 2 (two) times a day as needed      baclofen 10 mg tablet Take 1 tablet (10 mg total) by mouth 3 (three) times a day 30 tablet 0    Eliquis 5 MG       famotidine (PEPCID) 40 MG tablet Take 40 mg by mouth 2 (two) times a day      GLIPIZIDE PO Take 2.5 mg by mouth daily Now being ordered as a 2.5 instead of a 5mg breaking in half.      losartan (COZAAR) 100 MG tablet Take 1 tablet (100 mg total) by mouth daily (Patient taking differently: Take 50 mg by mouth daily) 90 tablet 3    metoprolol tartrate (LOPRESSOR) 50 mg tablet 100 mg 2 (two) times a day        RABEprazole (ACIPHEX) 20 MG tablet Take 1 tablet (20 mg total) by  "mouth daily 30 tablet 1    Vitamin D, Cholecalciferol, 50 MCG (2000 UT) CAPS Take by mouth in the morning      RABEprazole (ACIPHEX) 20 MG tablet Take 1 tablet (20 mg total) by mouth 2 (two) times a day 60 tablet 2     No current facility-administered medications for this visit.     Allergies   Allergen Reactions    Dilaudid [Hydromorphone Hcl] Anaphylaxis    Hydromorphone Anaphylaxis     Other reaction(s): flushing, SOB    Metronidazole Anaphylaxis and Rash     Other reaction(s): nausea    Morphine Anaphylaxis, Rash and GI Intolerance     Other reaction(s): hives, flushing    Esomeprazole Other (See Comments)    Hydralazine Other (See Comments)     Cannot remember reation  Other reaction(s): vomiting, sweating, chills, itching    Metformin Other (See Comments)    Prednisone Other (See Comments)    Simvastatin Other (See Comments)     Other reaction(s): myalgias    Sulfamethoxazole-Trimethoprim Other (See Comments)     Other reaction(s): cannot recall    Valsartan GI Intolerance     Difficulty, swallowing, breathing  Other reaction(s): epigastric pain/GERD    Amlodipine Rash     Other reaction(s): ankle swelling    Ciprofloxacin GI Intolerance     Other reaction(s): unknown    Clindamycin Other (See Comments)     C diff  Other reaction(s): c-diff infection    Dexlansoprazole Other (See Comments)     Unknown per pt  Other reaction(s): shortness of breath    Metformin Hydrochloride-Repaglinide [Repaglinide-Metformin Hcl] Other (See Comments)     Unknown per pt      Nexium [Esomeprazole Magnesium] Diarrhea and GI Intolerance    Pantoprazole Diarrhea and GI Intolerance     Other reaction(s): allergy to generic only       Objective   Vitals: Blood pressure 142/76, pulse 66, height 5' 7\" (1.702 m), weight 100 kg (221 lb 6.4 oz).  Invasive Devices       None                   Physical Exam    Thyroid is nodular and irregular in feel, but no discrete nodules palpable.  Lungs are clear to auscultation.  Heart has a regular " rhythm today.  No tremor of the outstretched hands. Patellar deep tendon reflexes are normal. No lower extremity edema. Dorsalis pedis and posterior tibialis pulses are 2+. There is an old scab on the dorsum of the right second toe. A callus is present on the plantar surface of the right fifth metatarsophalangeal joint. Vibration sensation is diminished to the first toe DIP joint on the left and markedly diminished to the first toe DIP joint on the right. Monofilament sensation on the right is absent to the first and fifth toes, fifth metatarsophalangeal joint, heel and dorsum was otherwise intact. Monofilament sensation is absent on the left to the first and fifth toes, diminished to the first and fifth metatarsophalangeal joints but was otherwise intact.  Patient's shoes and socks removed.    Right Foot/Ankle   Right Foot Inspection  Skin Exam: skin normal, skin intact, callus and callus. No dry skin, no warmth, no erythema, no maceration, no abnormal color, no pre-ulcer and no ulcer.     Toe Exam: No swelling and  no right toe deformity    Sensory   Vibration: diminished  Monofilament testing: diminished    Vascular  The right DP pulse is 2+. The right PT pulse is 2+.     Left Foot/Ankle  Left Foot Inspection  Skin Exam: skin normal and skin intact. No dry skin, no warmth, no erythema, no maceration, normal color, no pre-ulcer, no ulcer and no callus.     Toe Exam: No swelling and no left toe deformity.     Sensory   Vibration: diminished  Monofilament testing: diminished    Vascular  The left DP pulse is 2+. The left PT pulse is 2+.     Assign Risk Category  No deformity present  Loss of protective sensation  No weak pulses  Risk: 1        The history was obtained from the review of the chart and from the patient.    Lab Results:    Most recent Alc is  Lab Results   Component Value Date    HGBA1C 6.7 (H) 02/07/2025           Blood work performed on 2/7/2025 showed a 25-hydroxy vitamin D level of 51.2.    Urine  microalbumin to creatinine ratio is 9.    CBC is normal.    CMP showed glucose of 132 fasting but was otherwise normal.    Lab Results   Component Value Date    CREATININE 0.92 02/07/2025    CREATININE 0.98 09/15/2024    CREATININE 0.84 07/24/2024    BUN 14 02/07/2025     12/11/2015    K 5.0 02/07/2025     02/07/2025    CO2 24 02/07/2025     eGFR   Date Value Ref Range Status   02/07/2025 67 >59 mL/min/1.73 Final   09/15/2024 58 ml/min/1.73sq m Final         Lab Results   Component Value Date    HDL 53 07/24/2024    TRIG 112 07/24/2024       Lab Results   Component Value Date    ALT 20 02/07/2025    AST 21 02/07/2025    ALKPHOS 77 09/15/2024       Lab Results   Component Value Date    TSH 3.100 07/24/2024    FREET4 1.09 07/24/2024             Future Appointments   Date Time Provider Department Center   3/10/2025  8:45 AM Kimberly Uribe MD [facility]   4/8/2025  8:00 AM Addison Knight DO [facility]   4/22/2025  8:00 AM James Daniels MD [facility]   9/8/2025  8:00 AM Juju Calvillo MD [facility]

## 2025-03-04 NOTE — PATIENT INSTRUCTIONS
Hgba1c is 6.7%. this is excellent.     Continue the same glipizide dosage.     Continue to test blood sugars 2 times a day.     Continue to eat healthy and exercise as able.     The vitamin D is normal. Continue the same vitamin d.     Follow up in 6 months with blood work.

## 2025-03-07 NOTE — PROGRESS NOTES
Name: Tyra Edward      : 1955      MRN: [id number]  Encounter Provider: Kimberly Uribe MD  Encounter Date: 3/10/2025   Encounter department: [facility] [place]  :  Assessment & Plan  Vagina itching  Vaginal irritation for years. Has been treated with diflucan x 7 days every 3 months with PCP per pt report.   Recent GI evaluation with lichen planus in esophagus, questions vaginal lichen.     Exam with external irritation, vaginal discharge, wet mount c/w BV. Clindamycin cream x 7 days rx sent. H/o c.diff in past, discussed no allergy to clindamycin, but would avoid oral therapy. No risk for c.diff with vaginal rx. Agrees to plan     Orders:    POCT wet mount    BV (bacterial vaginosis)  Wet mount sig for BV, Mobiluncus, alkaline.    Orders:    clindamycin (CLEOCIN) 2 % vaginal cream; Insert 1 applicator into the vagina daily at bedtime for 7 days    Lichen planus  No evidence on exam today.  Orders:    Ambulatory Referral to Obstetrics / Gynecology        History of Present Illness   HPI  Tyra Edward is a 69 y.o. female who presents for evaluation of chronic itching and recent dx of lichen planus.    Review of Systems +vaginal itching  No PMB. No abdominal pain.    Past Medical History   Past Medical History:   Diagnosis Date    Anemia     As a child    Anxiety     Arthritis 2016    Atrial fibrillation (HCC)     Diabetes mellitus (HCC)     Difficulty walking     Diverticulitis of colon     Fatty liver     GERD (gastroesophageal reflux disease)     Hypertension 2016    Hypertensive urgency 2016    Hypokalemia 2016    Hypomagnesemia 2016    Lichen planus     esophagus    Lupus 2023    Lymphocytic esophagitis     lichenoid lymphocytic esophagitis    Nephrolithiasis     Neuropathy in diabetes (HCC)     Ongoing symptomatic disease due to COVID-19 virus     Plantar fasciitis     PTSD (post-traumatic stress disorder)     Thyroid nodule       Past Surgical History:   Procedure Laterality Date    AV NODE ABLATION      CARDIAC SURGERY  2018    Ablation     SECTION      x3    CHOLECYSTECTOMY      COLONOSCOPY  2003    ENDOMETRIAL ABLATION      KIDNEY STONE SURGERY      OOPHORECTOMY Left 1978    UPPER GASTROINTESTINAL ENDOSCOPY       Family History   Problem Relation Age of Onset    Ovarian cancer Mother 45    Heart disease Mother     Heart attack Father     Heart disease Father     Diabetes type II Sister     Ovarian cancer Sister 62    Heart disease Sister     ALONZO disease Daughter     No Known Problems Daughter     No Known Problems Maternal Grandmother     No Known Problems Maternal Grandfather     No Known Problems Paternal Grandmother     No Known Problems Paternal Grandfather     Diabetes unspecified Brother         prediabetes    Hypertension Brother     Obesity Brother     ALONZO disease Son     No Known Problems Maternal Aunt     Diabetes type II Maternal Aunt     No Known Problems Maternal Aunt     Diabetes type II Maternal Aunt     No Known Problems Paternal Aunt     No Known Problems Paternal Aunt     Pancreatic cancer Cousin         30's    Melanoma Niece 41        Metastatic    Colon cancer Neg Hx     Colon polyps Neg Hx       reports that she has never smoked. She has never used smokeless tobacco. She reports that she does not drink alcohol and does not use drugs.  Current Outpatient Medications   Medication Instructions    ALPRAZolam (XANAX) 0.25 mg tablet 1 tablet, 2 times daily PRN    baclofen 10 mg, Oral, 3 times daily    clindamycin (CLEOCIN) 2 % vaginal cream 1 applicator, Vaginal, Daily at bedtime    Eliquis 5 MG     famotidine (PEPCID) 40 mg, 2 times daily    GLIPIZIDE PO 2.5 mg, Daily    losartan (COZAAR) 100 mg, Oral, Daily    metoprolol tartrate (LOPRESSOR) 100 mg, 2 times daily    RABEprazole (ACIPHEX) 20 mg, Oral, Daily    RABEprazole (ACIPHEX) 20 mg, Oral, 2 times daily    Vitamin D, Cholecalciferol, 50 MCG (2000) CAPS  "Daily     Allergies   Allergen Reactions    Dilaudid [Hydromorphone Hcl] Anaphylaxis    Hydromorphone Anaphylaxis     Other reaction(s): flushing, SOB    Metronidazole Anaphylaxis and Rash     Other reaction(s): nausea    Morphine Anaphylaxis, Rash and GI Intolerance     Other reaction(s): hives, flushing    Esomeprazole Other (See Comments)    Hydralazine Other (See Comments)     Cannot remember reation  Other reaction(s): vomiting, sweating, chills, itching    Metformin Other (See Comments)    Prednisone Other (See Comments)    Simvastatin Other (See Comments)     Other reaction(s): myalgias    Sulfamethoxazole-Trimethoprim Other (See Comments)     Other reaction(s): cannot recall    Valsartan GI Intolerance     Difficulty, swallowing, breathing  Other reaction(s): epigastric pain/GERD    Amlodipine Rash     Other reaction(s): ankle swelling    Ciprofloxacin GI Intolerance     Other reaction(s): unknown    Clindamycin Other (See Comments)     C diff  Other reaction(s): c-diff infection    Dexlansoprazole Other (See Comments)     Unknown per pt  Other reaction(s): shortness of breath    Metformin Hydrochloride-Repaglinide [Repaglinide-Metformin Hcl] Other (See Comments)     Unknown per pt      Nexium [Esomeprazole Magnesium] Diarrhea and GI Intolerance    Pantoprazole Diarrhea and GI Intolerance     Other reaction(s): allergy to generic only         Objective   /76 (BP Location: Left arm, Patient Position: Sitting, Cuff Size: Standard)   Ht 5' 7\" (1.702 m)   Wt 99.1 kg (218 lb 6.4 oz)   LMP  (LMP Unknown)   BMI 34.21 kg/m²      Declined offered chaperone    Physical Exam  Appears well, no apparent distress.   Does not appear anxious or depressed.  Pelvic exam: atrophic, erythematous slightly indurated external genitalia, vagina erythematous with thin white discharge, cervix without lesions. Uterus small, nontender. No adnexal masses, nontender.   Rectal deferred    Results for orders placed or performed " in visit on 03/10/25   POCT wet mount   Result Value Ref Range    WET MOUNT abnormal     Yeast, Wet Prep neg     pH 4.5     Whiff Test n/a     Clue Cells few     Trich, Wet Prep neg     OTHER +mobiluncus

## 2025-03-10 ENCOUNTER — OFFICE VISIT (OUTPATIENT)
Dept: OBGYN CLINIC | Facility: CLINIC | Age: 70
End: 2025-03-10
Payer: COMMERCIAL

## 2025-03-10 VITALS
SYSTOLIC BLOOD PRESSURE: 140 MMHG | BODY MASS INDEX: 34.28 KG/M2 | HEIGHT: 67 IN | WEIGHT: 218.4 LBS | DIASTOLIC BLOOD PRESSURE: 76 MMHG

## 2025-03-10 DIAGNOSIS — L43.9 LICHEN PLANUS: ICD-10-CM

## 2025-03-10 DIAGNOSIS — N76.0 BV (BACTERIAL VAGINOSIS): ICD-10-CM

## 2025-03-10 DIAGNOSIS — N89.8 VAGINA ITCHING: Primary | ICD-10-CM

## 2025-03-10 DIAGNOSIS — B96.89 BV (BACTERIAL VAGINOSIS): ICD-10-CM

## 2025-03-10 LAB
BV WHIFF TEST VAG QL: ABNORMAL
CLUE CELLS SPEC QL WET PREP: ABNORMAL
PH SMN: 4.5 [PH]
SL AMB POCT WET MOUNT: ABNORMAL
T VAGINALIS VAG QL WET PREP: ABNORMAL
UNIDENT CELLS NFR BLD MANUAL: ABNORMAL %
YEAST VAG QL WET PREP: ABNORMAL

## 2025-03-10 PROCEDURE — 87210 SMEAR WET MOUNT SALINE/INK: CPT | Performed by: OBSTETRICS & GYNECOLOGY

## 2025-03-10 PROCEDURE — 99213 OFFICE O/P EST LOW 20 MIN: CPT | Performed by: OBSTETRICS & GYNECOLOGY

## 2025-03-10 RX ORDER — CLINDAMYCIN PHOSPHATE 20 MG/G
1 CREAM VAGINAL
Qty: 40 G | Refills: 0 | Status: SHIPPED | OUTPATIENT
Start: 2025-03-10 | End: 2025-03-17

## 2025-04-08 ENCOUNTER — OFFICE VISIT (OUTPATIENT)
Dept: GASTROENTEROLOGY | Facility: CLINIC | Age: 70
End: 2025-04-08
Payer: COMMERCIAL

## 2025-04-08 VITALS
BODY MASS INDEX: 34.53 KG/M2 | HEIGHT: 67 IN | DIASTOLIC BLOOD PRESSURE: 80 MMHG | WEIGHT: 220 LBS | SYSTOLIC BLOOD PRESSURE: 126 MMHG

## 2025-04-08 DIAGNOSIS — K44.9 HIATAL HERNIA WITH GERD AND ESOPHAGITIS: Primary | ICD-10-CM

## 2025-04-08 DIAGNOSIS — K21.00 HIATAL HERNIA WITH GERD AND ESOPHAGITIS: Primary | ICD-10-CM

## 2025-04-08 PROCEDURE — 99214 OFFICE O/P EST MOD 30 MIN: CPT | Performed by: INTERNAL MEDICINE

## 2025-04-08 NOTE — PROGRESS NOTES
"Name: Tyra Edward      : 1955      MRN: [id number]  Encounter Provider: Addison Knight DO  Encounter Date: 2025   [facility]  :  Assessment & Plan  Hiatal hernia with GERD and esophagitis  Responding well to current antisecretory regimen of rabeprazole 20 mg twice daily.  I will keep her on that and reevaluate in 2 months.  If completely asymptomatic, we will discuss decreasing to once a day.  We also discussed surgical options, but she is not interested at this time.  She has had a recurrence of her hiatal hernia, which she was surprised to hear about.           History of Present Illness   HPI  Tyra Edward is a 69 y.o. female who presents in follow-up but as a new patient to me for GERD.  She was referred to me by my partner as an esophageal specialist.  She has been dealing with reflux for years, having been on almost all of the proton pump inhibitors at 1 point.  She has had various adverse reactions to most of them.  She was doing well for a long time on brand-name Protonix, but recently in the last year or so has developed odynophagia and dysphagia to both solids and liquids.  She had a recent endoscopy that was relatively normal except biopsies showing lymphocytic esophagitis.  She subsequently was placed on rabeprazole 20 mg twice daily and she has had great improvement in her symptoms.  She takes the medications, she has no breakthrough symptoms and has no dysphagia.  She is not a smoker.  Apparently in the distant past-about 20 years ago-she had a hiatal hernia repaired at the same time as her cholecystectomy.  She did not have a fundoplication.      Review of Systems       Objective   /80   Ht 5' 7\" (1.702 m)   Wt 99.8 kg (220 lb)   LMP  (LMP Unknown)   BMI 34.46 kg/m²      Physical Exam  General Appearance: NAD, cooperative, alert  Eyes: Anicteric  GI:  Soft, non-tender, " non-distended; normal bowel sounds; no masses, no organomegaly   Rectal: Deferred  Musculoskeletal: No edema.  Skin:  No jaundice

## 2025-04-13 ENCOUNTER — TELEPHONE (OUTPATIENT)
Dept: OTHER | Facility: OTHER | Age: 70
End: 2025-04-13

## 2025-04-13 NOTE — TELEPHONE ENCOUNTER
Patient is calling regarding cancelling an appointment.    Date/Time: 4/14/25    Patient was rescheduled: YES [] NO [x]    Patient requesting call back to reschedule: YES [x] NO []    Pt reached answering service, has stomach bug, please call to help her reschedule.

## 2025-04-14 ENCOUNTER — TELEPHONE (OUTPATIENT)
Dept: OBGYN CLINIC | Facility: CLINIC | Age: 70
End: 2025-04-14

## 2025-04-14 NOTE — TELEPHONE ENCOUNTER
4/14/25 LMOM on both numbers in patient's file, sent my chart message to r/s appointment that was cancel with Dr. Uribe

## 2025-05-12 LAB — HBA1C MFR BLD HPLC: 6.7 %

## 2025-05-19 LAB
CREAT ?TM UR-SCNC: 33.6 UMOL/L
MICROALBUMIN/CREAT UR: <9 MG/G{CREAT}

## 2025-06-04 ENCOUNTER — TELEPHONE (OUTPATIENT)
Dept: GASTROENTEROLOGY | Facility: CLINIC | Age: 70
End: 2025-06-04

## 2025-06-04 ENCOUNTER — OFFICE VISIT (OUTPATIENT)
Dept: GASTROENTEROLOGY | Facility: CLINIC | Age: 70
End: 2025-06-04
Payer: COMMERCIAL

## 2025-06-04 VITALS
SYSTOLIC BLOOD PRESSURE: 126 MMHG | DIASTOLIC BLOOD PRESSURE: 78 MMHG | BODY MASS INDEX: 34.59 KG/M2 | HEIGHT: 67 IN | WEIGHT: 220.4 LBS

## 2025-06-04 DIAGNOSIS — K44.9 HIATAL HERNIA WITH GERD AND ESOPHAGITIS: Primary | ICD-10-CM

## 2025-06-04 DIAGNOSIS — K20.80 LYMPHOCYTIC ESOPHAGITIS: ICD-10-CM

## 2025-06-04 DIAGNOSIS — K21.00 HIATAL HERNIA WITH GERD AND ESOPHAGITIS: Primary | ICD-10-CM

## 2025-06-04 PROCEDURE — 99214 OFFICE O/P EST MOD 30 MIN: CPT | Performed by: INTERNAL MEDICINE

## 2025-06-04 RX ORDER — LOSARTAN POTASSIUM 100 MG/1
TABLET ORAL
COMMUNITY
Start: 2025-01-01

## 2025-06-04 RX ORDER — RABEPRAZOLE SODIUM 20 MG/1
20 TABLET, DELAYED RELEASE ORAL 2 TIMES DAILY
Qty: 60 TABLET | Refills: 11 | Status: SHIPPED | OUTPATIENT
Start: 2025-06-04 | End: 2025-07-04

## 2025-06-04 RX ORDER — GLIPIZIDE 2.5 MG/1
1 TABLET, EXTENDED RELEASE ORAL DAILY
COMMUNITY
Start: 2025-04-08

## 2025-06-04 NOTE — PROGRESS NOTES
"Name: Tyra Edward      : 1955      MRN: [id number]  Encounter Provider: Addison Knight DO  Encounter Date: 2025   Encounter department: [facility]  :  Assessment & Plan  Hiatal hernia with GERD and esophagitis  Doing really well on her current antisecretory regimen of rabeprazole 20 mg twice daily.  She has noticed that she forgets a dose she is almost immediately symptomatic, so she would like to stay on the twice daily schedule.  I have given her refills, and she will follow-up as needed.       Lymphocytic esophagitis  Likely related to reflux as above.  No need to rescope to assess response to PPI, as she is asymptomatic.  Orders:    RABEprazole (ACIPHEX) 20 MG tablet; Take 1 tablet (20 mg total) by mouth 2 (two) times a day        History of Present Illness   HPI  Tyra Edward is a 69 y.o. female who presents in follow-up due to GERD with esophagitis and hiatal hernia and lymphocytic esophagitis.  She is quite content on her current antisecretory regimen of rabeprazole 20 mg twice daily.  She is taking it appropriately.  She has found that if she misses a dose, she is almost immediately symptomatic.  She very rarely has the feeling of food slowly going down her chest after swallowing, but overall she is quite pleased.      Review of Systems       Objective   /78   Ht 5' 7\" (1.702 m)   Wt 100 kg (220 lb 6.4 oz)   LMP  (LMP Unknown)   BMI 34.52 kg/m²      Physical Exam  General Appearance: NAD, cooperative, alert  Eyes: Anicteric  GI:  Soft, non-tender, non-distended; normal bowel sounds; no masses, no organomegaly   Rectal: Deferred  Musculoskeletal: No edema.  Skin:  No jaundice      "

## 2025-06-04 NOTE — TELEPHONE ENCOUNTER
PA for Aciphex 20mg SUBMITTED to future scripts    via    []CMM-KEY:    [x]Surescripts-Case ID #   [id number]   []Availity-Auth ID #  NDC #    []Faxed to plan   []Other website     []Phone call Case ID #      [x]PA sent as URGENT    All office notes, labs and other pertaining documents and studies sent. Clinical questions answered. Awaiting determination from insurance company.     Turnaround time for your insurance to make a decision on your Prior Authorization can take 7-21 business days.

## 2025-06-05 NOTE — TELEPHONE ENCOUNTER
PA for Aciphex 20mg APPROVED     Date(s) approved 06/04/2025-12/31/2026    Case #[id number]     Patient advised by          []MyChart Message  [x]Phone call   []LMOM  []L/M to call office as no active Communication consent on file  []Unable to leave detailed message as VM not approved on Communication consent       Pharmacy advised by    [x]Fax  []Phone call  []Secure Chat    Specialty Pharmacy         Approval letter scanned into Media Yes

## 2025-06-05 NOTE — TELEPHONE ENCOUNTER
[employer] calling stating Aciphex has been approved, case # [id number], approved from 6/4/25-12/31/26

## 2025-07-28 DIAGNOSIS — Z12.31 ENCOUNTER FOR SCREENING MAMMOGRAM FOR MALIGNANT NEOPLASM OF BREAST: ICD-10-CM
